# Patient Record
Sex: FEMALE | Race: WHITE | Employment: OTHER | URBAN - METROPOLITAN AREA
[De-identification: names, ages, dates, MRNs, and addresses within clinical notes are randomized per-mention and may not be internally consistent; named-entity substitution may affect disease eponyms.]

---

## 2018-06-20 RX ORDER — ALBUTEROL SULFATE 90 UG/1
2 AEROSOL, METERED RESPIRATORY (INHALATION) EVERY 6 HOURS PRN
COMMUNITY
End: 2019-04-09 | Stop reason: ALTCHOICE

## 2018-06-20 RX ORDER — NALTREXONE HYDROCHLORIDE 50 MG/1
50 TABLET, FILM COATED ORAL DAILY
COMMUNITY
End: 2019-04-09 | Stop reason: ALTCHOICE

## 2018-06-20 NOTE — PROGRESS NOTES
Karla 38 PRE-ADMISSION TESTING GENERAL INSTRUCTIONS- [facility]-phone number:[phone]    GENERAL INSTRUCTIONS  [x] Antibacterial Soap shower Night before and/or AM of Surgery  [] Jasmeet wipe instruction sheet and wipes given. [x] Nothing by mouth after midnight, including gum, candy, mints, or water. [x] You may brush your teeth, gargle, but do NOT swallow water. []Hibiclens shower  the night before and the morning of surgery. Do not use             Hibiclens on your face or head. [x]No smoking, chewing tobacco, illegal drugs, or alcohol within 24 hours of your surgery. [x] Jewelry, valuables or body piercing's should not be brought to the hospital. All body and/or tongue piercing's must be removed prior to arriving to hospital.  ALL hair pins must be removed. [x] Do not wear makeup, lotions, powders, deodorant. Nail polish as directed by the nurse. [x] Arrange transportation to and from the hospital.  Arrange for someone to be with you for the remainder of the day and for 24 hours after your procedure due to having had anesthesia. [] Bring insurance card and photo ID.  [] Transfusion Bracelet: Please bring with you to hospital, day of surgery  [] Bring urine specimen day of surgery. Any small container is acceptable. [x] Use inhalers the morning of surgery and bring with you to hospital.   []Bring copy of living will or healthcare power of  papers to be placed in your electronic record. [] CPAP/BI-PAP: Please bring your machine if you are to spend the night in the hospital.     ENDOSCOPY INSTRUCTIONS:   [] Bowel prep instructions reviewed. [] Nothing by mouth after midnight, including gum, candy, mints, or water.  [] You may brush your teeth, gargle, but do NOT swallow water. [] Do not wear makeup, lotions, powders, deodorant. Nail polish as directed by the nurse.   [] Arrange transportation to and from the hospital.  Arrange for someone to be with you for the

## 2018-06-27 ENCOUNTER — ANESTHESIA EVENT (OUTPATIENT)
Dept: ENDOSCOPY | Age: 58
End: 2018-06-27
Payer: COMMERCIAL

## 2018-06-28 ENCOUNTER — ANESTHESIA (OUTPATIENT)
Dept: ENDOSCOPY | Age: 58
End: 2018-06-28
Payer: COMMERCIAL

## 2018-06-28 ENCOUNTER — HOSPITAL ENCOUNTER (OUTPATIENT)
Age: 58
Setting detail: OUTPATIENT SURGERY
Discharge: HOME | End: 2018-06-28
Attending: ORAL & MAXILLOFACIAL SURGERY | Admitting: ORAL & MAXILLOFACIAL SURGERY
Payer: COMMERCIAL

## 2018-06-28 VITALS
SYSTOLIC BLOOD PRESSURE: 187 MMHG | OXYGEN SATURATION: 100 % | TEMPERATURE: 95.9 F | RESPIRATION RATE: 14 BRPM | DIASTOLIC BLOOD PRESSURE: 111 MMHG

## 2018-06-28 VITALS
DIASTOLIC BLOOD PRESSURE: 67 MMHG | TEMPERATURE: 97.6 F | OXYGEN SATURATION: 96 % | BODY MASS INDEX: 20.31 KG/M2 | HEIGHT: 68 IN | SYSTOLIC BLOOD PRESSURE: 158 MMHG | RESPIRATION RATE: 18 BRPM | WEIGHT: 134 LBS | HEART RATE: 79 BPM

## 2018-06-28 DIAGNOSIS — Z01.818 PREOPERATIVE TESTING: Primary | ICD-10-CM

## 2018-06-28 PROCEDURE — 6370000000 HC RX 637 (ALT 250 FOR IP): Performed by: ORAL & MAXILLOFACIAL SURGERY

## 2018-06-28 PROCEDURE — 2500000003 HC RX 250 WO HCPCS: Performed by: ORAL & MAXILLOFACIAL SURGERY

## 2018-06-28 PROCEDURE — 2500000003 HC RX 250 WO HCPCS

## 2018-06-28 PROCEDURE — 3600000002 HC SURGERY LEVEL 2 BASE: Performed by: ORAL & MAXILLOFACIAL SURGERY

## 2018-06-28 PROCEDURE — 3700000001 HC ADD 15 MINUTES (ANESTHESIA): Performed by: ORAL & MAXILLOFACIAL SURGERY

## 2018-06-28 PROCEDURE — 7100000011 HC PHASE II RECOVERY - ADDTL 15 MIN: Performed by: ORAL & MAXILLOFACIAL SURGERY

## 2018-06-28 PROCEDURE — 6370000000 HC RX 637 (ALT 250 FOR IP): Performed by: ANESTHESIOLOGY

## 2018-06-28 PROCEDURE — 6360000002 HC RX W HCPCS: Performed by: ORAL & MAXILLOFACIAL SURGERY

## 2018-06-28 PROCEDURE — 7100000001 HC PACU RECOVERY - ADDTL 15 MIN: Performed by: ORAL & MAXILLOFACIAL SURGERY

## 2018-06-28 PROCEDURE — 6360000002 HC RX W HCPCS: Performed by: ANESTHESIOLOGY

## 2018-06-28 PROCEDURE — 3600000012 HC SURGERY LEVEL 2 ADDTL 15MIN: Performed by: ORAL & MAXILLOFACIAL SURGERY

## 2018-06-28 PROCEDURE — 2709999900 HC NON-CHARGEABLE SUPPLY: Performed by: ORAL & MAXILLOFACIAL SURGERY

## 2018-06-28 PROCEDURE — 6360000002 HC RX W HCPCS

## 2018-06-28 PROCEDURE — 7100000010 HC PHASE II RECOVERY - FIRST 15 MIN: Performed by: ORAL & MAXILLOFACIAL SURGERY

## 2018-06-28 PROCEDURE — 94640 AIRWAY INHALATION TREATMENT: CPT

## 2018-06-28 PROCEDURE — 7100000000 HC PACU RECOVERY - FIRST 15 MIN: Performed by: ORAL & MAXILLOFACIAL SURGERY

## 2018-06-28 PROCEDURE — 3700000000 HC ANESTHESIA ATTENDED CARE: Performed by: ORAL & MAXILLOFACIAL SURGERY

## 2018-06-28 PROCEDURE — 2580000003 HC RX 258: Performed by: ORAL & MAXILLOFACIAL SURGERY

## 2018-06-28 PROCEDURE — 2500000003 HC RX 250 WO HCPCS: Performed by: PHYSICIAN ASSISTANT

## 2018-06-28 RX ORDER — MIDAZOLAM HYDROCHLORIDE 1 MG/ML
INJECTION INTRAMUSCULAR; INTRAVENOUS PRN
Status: DISCONTINUED | OUTPATIENT
Start: 2018-06-28 | End: 2018-06-28 | Stop reason: SDUPTHER

## 2018-06-28 RX ORDER — NEOSTIGMINE METHYLSULFATE 0.5 MG/ML
INJECTION, SOLUTION INTRAVENOUS PRN
Status: DISCONTINUED | OUTPATIENT
Start: 2018-06-28 | End: 2018-06-28 | Stop reason: SDUPTHER

## 2018-06-28 RX ORDER — DEXAMETHASONE SODIUM PHOSPHATE 4 MG/ML
8 INJECTION, SOLUTION INTRA-ARTICULAR; INTRALESIONAL; INTRAMUSCULAR; INTRAVENOUS; SOFT TISSUE ONCE
Status: DISCONTINUED | OUTPATIENT
Start: 2018-06-28 | End: 2018-06-28 | Stop reason: HOSPADM

## 2018-06-28 RX ORDER — LABETALOL HYDROCHLORIDE 5 MG/ML
5 INJECTION, SOLUTION INTRAVENOUS EVERY 10 MIN PRN
Status: DISCONTINUED | OUTPATIENT
Start: 2018-06-28 | End: 2018-06-28 | Stop reason: HOSPADM

## 2018-06-28 RX ORDER — HYDROCODONE BITARTRATE AND ACETAMINOPHEN 5; 325 MG/1; MG/1
1 TABLET ORAL ONCE
Status: COMPLETED | OUTPATIENT
Start: 2018-06-28 | End: 2018-06-28

## 2018-06-28 RX ORDER — PROPOFOL 10 MG/ML
INJECTION, EMULSION INTRAVENOUS PRN
Status: DISCONTINUED | OUTPATIENT
Start: 2018-06-28 | End: 2018-06-28 | Stop reason: SDUPTHER

## 2018-06-28 RX ORDER — DEXAMETHASONE SODIUM PHOSPHATE 10 MG/ML
INJECTION INTRAMUSCULAR; INTRAVENOUS PRN
Status: DISCONTINUED | OUTPATIENT
Start: 2018-06-28 | End: 2018-06-28 | Stop reason: SDUPTHER

## 2018-06-28 RX ORDER — FENTANYL CITRATE 50 UG/ML
25 INJECTION, SOLUTION INTRAMUSCULAR; INTRAVENOUS EVERY 5 MIN PRN
Status: DISCONTINUED | OUTPATIENT
Start: 2018-06-28 | End: 2018-06-28 | Stop reason: HOSPADM

## 2018-06-28 RX ORDER — ROCURONIUM BROMIDE 10 MG/ML
INJECTION, SOLUTION INTRAVENOUS PRN
Status: DISCONTINUED | OUTPATIENT
Start: 2018-06-28 | End: 2018-06-28 | Stop reason: SDUPTHER

## 2018-06-28 RX ORDER — HYDRALAZINE HYDROCHLORIDE 20 MG/ML
5 INJECTION INTRAMUSCULAR; INTRAVENOUS EVERY 10 MIN PRN
Status: DISCONTINUED | OUTPATIENT
Start: 2018-06-28 | End: 2018-06-28 | Stop reason: HOSPADM

## 2018-06-28 RX ORDER — PROMETHAZINE HYDROCHLORIDE 25 MG/ML
6.25 INJECTION, SOLUTION INTRAMUSCULAR; INTRAVENOUS
Status: COMPLETED | OUTPATIENT
Start: 2018-06-28 | End: 2018-06-28

## 2018-06-28 RX ORDER — ONDANSETRON 2 MG/ML
INJECTION INTRAMUSCULAR; INTRAVENOUS PRN
Status: DISCONTINUED | OUTPATIENT
Start: 2018-06-28 | End: 2018-06-28 | Stop reason: SDUPTHER

## 2018-06-28 RX ORDER — LIDOCAINE HYDROCHLORIDE AND EPINEPHRINE 10; 10 MG/ML; UG/ML
INJECTION, SOLUTION INFILTRATION; PERINEURAL PRN
Status: DISCONTINUED | OUTPATIENT
Start: 2018-06-28 | End: 2018-06-28 | Stop reason: HOSPADM

## 2018-06-28 RX ORDER — MEPERIDINE HYDROCHLORIDE 50 MG/ML
12.5 INJECTION INTRAMUSCULAR; INTRAVENOUS; SUBCUTANEOUS EVERY 5 MIN PRN
Status: DISCONTINUED | OUTPATIENT
Start: 2018-06-28 | End: 2018-06-28 | Stop reason: HOSPADM

## 2018-06-28 RX ORDER — FENTANYL CITRATE 50 UG/ML
INJECTION, SOLUTION INTRAMUSCULAR; INTRAVENOUS PRN
Status: DISCONTINUED | OUTPATIENT
Start: 2018-06-28 | End: 2018-06-28 | Stop reason: SDUPTHER

## 2018-06-28 RX ORDER — GLYCOPYRROLATE 1 MG/5 ML
SYRINGE (ML) INTRAVENOUS PRN
Status: DISCONTINUED | OUTPATIENT
Start: 2018-06-28 | End: 2018-06-28 | Stop reason: SDUPTHER

## 2018-06-28 RX ORDER — SODIUM CHLORIDE 9 MG/ML
INJECTION, SOLUTION INTRAVENOUS CONTINUOUS
Status: DISCONTINUED | OUTPATIENT
Start: 2018-06-28 | End: 2018-06-28 | Stop reason: HOSPADM

## 2018-06-28 RX ORDER — IPRATROPIUM BROMIDE AND ALBUTEROL SULFATE 2.5; .5 MG/3ML; MG/3ML
1 SOLUTION RESPIRATORY (INHALATION) ONCE
Status: COMPLETED | OUTPATIENT
Start: 2018-06-28 | End: 2018-06-28

## 2018-06-28 RX ADMIN — LIDOCAINE HYDROCHLORIDE 100 MG: 20 INJECTION, SOLUTION INTRAVENOUS at 09:30

## 2018-06-28 RX ADMIN — FENTANYL CITRATE 100 MCG: 50 INJECTION, SOLUTION INTRAMUSCULAR; INTRAVENOUS at 09:30

## 2018-06-28 RX ADMIN — Medication 0.4 MG: at 10:00

## 2018-06-28 RX ADMIN — AMPICILLIN SODIUM 2 G: 2 INJECTION, POWDER, FOR SOLUTION INTRAMUSCULAR; INTRAVENOUS at 09:30

## 2018-06-28 RX ADMIN — SUGAMMADEX 200 MG: 100 INJECTION, SOLUTION INTRAVENOUS at 10:10

## 2018-06-28 RX ADMIN — DEXAMETHASONE SODIUM PHOSPHATE 10 MG: 10 INJECTION INTRAMUSCULAR; INTRAVENOUS at 09:30

## 2018-06-28 RX ADMIN — ROCURONIUM BROMIDE 10 MG: 10 INJECTION, SOLUTION INTRAVENOUS at 09:40

## 2018-06-28 RX ADMIN — PROPOFOL 200 MG: 10 INJECTION, EMULSION INTRAVENOUS at 09:30

## 2018-06-28 RX ADMIN — NEOSTIGMINE METHYLSULFATE 3 MG: 0.5 INJECTION INTRAVENOUS at 10:00

## 2018-06-28 RX ADMIN — ROCURONIUM BROMIDE 30 MG: 10 INJECTION, SOLUTION INTRAVENOUS at 09:30

## 2018-06-28 RX ADMIN — ONDANSETRON 4 MG: 2 INJECTION INTRAMUSCULAR; INTRAVENOUS at 09:30

## 2018-06-28 RX ADMIN — FENTANYL CITRATE 25 MCG: 50 INJECTION, SOLUTION INTRAMUSCULAR; INTRAVENOUS at 10:36

## 2018-06-28 RX ADMIN — PROMETHAZINE HYDROCHLORIDE 6.25 MG: 25 INJECTION INTRAMUSCULAR; INTRAVENOUS at 13:22

## 2018-06-28 RX ADMIN — HYDROCODONE BITARTRATE AND ACETAMINOPHEN 1 TABLET: 5; 325 TABLET ORAL at 14:27

## 2018-06-28 RX ADMIN — FENTANYL CITRATE 25 MCG: 50 INJECTION, SOLUTION INTRAMUSCULAR; INTRAVENOUS at 10:25

## 2018-06-28 RX ADMIN — SODIUM CHLORIDE: 9 INJECTION, SOLUTION INTRAVENOUS at 09:33

## 2018-06-28 RX ADMIN — MIDAZOLAM 2 MG: 1 INJECTION INTRAMUSCULAR; INTRAVENOUS at 09:30

## 2018-06-28 RX ADMIN — IPRATROPIUM BROMIDE AND ALBUTEROL SULFATE 1 AMPULE: 2.5; .5 SOLUTION RESPIRATORY (INHALATION) at 10:28

## 2018-06-28 RX ADMIN — FENTANYL CITRATE 25 MCG: 50 INJECTION, SOLUTION INTRAMUSCULAR; INTRAVENOUS at 10:50

## 2018-06-28 ASSESSMENT — PULMONARY FUNCTION TESTS
PIF_VALUE: 19
PIF_VALUE: 17
PIF_VALUE: 17
PIF_VALUE: 18
PIF_VALUE: 15
PIF_VALUE: 18
PIF_VALUE: 18
PIF_VALUE: 15
PIF_VALUE: 2
PIF_VALUE: 16
PIF_VALUE: 15
PIF_VALUE: 17
PIF_VALUE: 19
PIF_VALUE: 18
PIF_VALUE: 14
PIF_VALUE: 3
PIF_VALUE: 6
PIF_VALUE: 17
PIF_VALUE: 14
PIF_VALUE: 15
PIF_VALUE: 16
PIF_VALUE: 15
PIF_VALUE: 14
PIF_VALUE: 9
PIF_VALUE: 17
PIF_VALUE: 20
PIF_VALUE: 14
PIF_VALUE: 3
PIF_VALUE: 16
PIF_VALUE: 15
PIF_VALUE: 7
PIF_VALUE: 15
PIF_VALUE: 15
PIF_VALUE: 5
PIF_VALUE: 10
PIF_VALUE: 8
PIF_VALUE: 14
PIF_VALUE: 5
PIF_VALUE: 16

## 2018-06-28 ASSESSMENT — PAIN DESCRIPTION - DESCRIPTORS
DESCRIPTORS: SORE
DESCRIPTORS: ACHING;DISCOMFORT
DESCRIPTORS: DISCOMFORT;SORE
DESCRIPTORS: ACHING;DISCOMFORT;SORE
DESCRIPTORS: ACHING;DISCOMFORT

## 2018-06-28 ASSESSMENT — PAIN SCALES - GENERAL
PAINLEVEL_OUTOF10: 10
PAINLEVEL_OUTOF10: 6
PAINLEVEL_OUTOF10: 3
PAINLEVEL_OUTOF10: 10
PAINLEVEL_OUTOF10: 2
PAINLEVEL_OUTOF10: 10
PAINLEVEL_OUTOF10: 10

## 2018-06-28 ASSESSMENT — PAIN - FUNCTIONAL ASSESSMENT: PAIN_FUNCTIONAL_ASSESSMENT: 0-10

## 2018-06-28 ASSESSMENT — PAIN DESCRIPTION - FREQUENCY
FREQUENCY: CONTINUOUS

## 2018-06-28 ASSESSMENT — PAIN DESCRIPTION - LOCATION
LOCATION: MOUTH

## 2018-06-28 ASSESSMENT — PAIN DESCRIPTION - PAIN TYPE
TYPE: SURGICAL PAIN
TYPE: ACUTE PAIN;SURGICAL PAIN
TYPE: SURGICAL PAIN
TYPE: ACUTE PAIN;SURGICAL PAIN
TYPE: ACUTE PAIN;SURGICAL PAIN

## 2018-06-28 ASSESSMENT — LIFESTYLE VARIABLES: SMOKING_STATUS: 1

## 2018-06-28 ASSESSMENT — PAIN DESCRIPTION - PROGRESSION
CLINICAL_PROGRESSION: GRADUALLY IMPROVING
CLINICAL_PROGRESSION: NOT CHANGED
CLINICAL_PROGRESSION: NOT CHANGED

## 2018-06-28 ASSESSMENT — PAIN DESCRIPTION - ONSET: ONSET: GRADUAL

## 2018-09-26 ENCOUNTER — HOSPITAL ENCOUNTER (OUTPATIENT)
Dept: ULTRASOUND IMAGING | Age: 58
Discharge: HOME | End: 2018-09-26
Payer: COMMERCIAL

## 2018-09-26 DIAGNOSIS — M54.9 CVA TENDERNESS: ICD-10-CM

## 2018-09-26 PROCEDURE — 76775 US EXAM ABDO BACK WALL LIM: CPT

## 2018-12-28 ENCOUNTER — HOSPITAL ENCOUNTER (OUTPATIENT)
Dept: GENERAL RADIOLOGY | Age: 58
Discharge: HOME | End: 2018-12-30
Payer: COMMERCIAL

## 2018-12-28 ENCOUNTER — HOSPITAL ENCOUNTER (OUTPATIENT)
Age: 58
Discharge: HOME | End: 2018-12-30
Payer: COMMERCIAL

## 2018-12-28 DIAGNOSIS — M54.2 CERVICALGIA: ICD-10-CM

## 2018-12-28 PROCEDURE — 70360 X-RAY EXAM OF NECK: CPT

## 2019-04-09 ENCOUNTER — APPOINTMENT (OUTPATIENT)
Dept: GENERAL RADIOLOGY | Age: 59
End: 2019-04-09
Payer: COMMERCIAL

## 2019-04-09 ENCOUNTER — HOSPITAL ENCOUNTER (EMERGENCY)
Age: 59
Discharge: HOME | End: 2019-04-09
Attending: FAMILY MEDICINE
Payer: COMMERCIAL

## 2019-04-09 VITALS
DIASTOLIC BLOOD PRESSURE: 70 MMHG | TEMPERATURE: 97 F | SYSTOLIC BLOOD PRESSURE: 103 MMHG | OXYGEN SATURATION: 99 % | HEIGHT: 71 IN | WEIGHT: 145 LBS | BODY MASS INDEX: 20.3 KG/M2 | HEART RATE: 64 BPM | RESPIRATION RATE: 16 BRPM

## 2019-04-09 DIAGNOSIS — M25.551 RIGHT HIP PAIN: ICD-10-CM

## 2019-04-09 DIAGNOSIS — M47.812 CERVICAL SPINE ARTHRITIS: ICD-10-CM

## 2019-04-09 DIAGNOSIS — M54.50 LUMBAR PAIN: Primary | ICD-10-CM

## 2019-04-09 PROCEDURE — 72100 X-RAY EXAM L-S SPINE 2/3 VWS: CPT

## 2019-04-09 PROCEDURE — 73502 X-RAY EXAM HIP UNI 2-3 VIEWS: CPT

## 2019-04-09 PROCEDURE — 72050 X-RAY EXAM NECK SPINE 4/5VWS: CPT

## 2019-04-09 PROCEDURE — 99283 EMERGENCY DEPT VISIT LOW MDM: CPT

## 2019-04-09 RX ORDER — LIDOCAINE 4 G/G
1 PATCH TOPICAL DAILY
Qty: 30 PATCH | Refills: 0 | Status: ON HOLD | OUTPATIENT
Start: 2019-04-09 | End: 2021-09-14

## 2019-04-09 RX ORDER — CYCLOBENZAPRINE HCL 5 MG
5 TABLET ORAL 2 TIMES DAILY PRN
Qty: 30 TABLET | Refills: 0 | Status: SHIPPED | OUTPATIENT
Start: 2019-04-09 | End: 2019-04-19

## 2019-04-09 ASSESSMENT — PAIN DESCRIPTION - LOCATION: LOCATION: BACK;HIP;LEG

## 2019-04-09 ASSESSMENT — PAIN DESCRIPTION - PROGRESSION: CLINICAL_PROGRESSION: NOT CHANGED

## 2019-04-09 ASSESSMENT — PAIN DESCRIPTION - FREQUENCY: FREQUENCY: CONTINUOUS

## 2019-04-09 ASSESSMENT — PAIN DESCRIPTION - PAIN TYPE: TYPE: ACUTE PAIN

## 2019-04-09 ASSESSMENT — PAIN DESCRIPTION - ONSET: ONSET: ON-GOING

## 2019-04-09 ASSESSMENT — PAIN SCALES - GENERAL: PAINLEVEL_OUTOF10: 10

## 2019-04-09 ASSESSMENT — PAIN DESCRIPTION - ORIENTATION: ORIENTATION: RIGHT;LOWER;UPPER

## 2019-04-09 ASSESSMENT — PAIN DESCRIPTION - DESCRIPTORS: DESCRIPTORS: SHARP;BURNING

## 2019-04-09 NOTE — ED PROVIDER NOTES
to pain). Negative for self-injury. All other systems reviewed and are negative.        ------------------------- NURSING NOTES AND VITALS REVIEWED ---------------------------   The nursing notes within the ED encounter and vital signs as below have been reviewed. /70   Pulse 64   Temp 97 °F (36.1 °C) (Oral)   Resp 16   Ht 5' 11\" (1.803 m)   Wt 145 lb (65.8 kg)   LMP  (LMP Unknown)   SpO2 99%   BMI 20.22 kg/m²   Oxygen Saturation Interpretation: Normal    Physical Exam   Constitutional: She is oriented to person, place, and time. No distress. HENT:   Head: Normocephalic and atraumatic. Eyes: Conjunctivae and EOM are normal. Right eye exhibits no discharge. Left eye exhibits no discharge. Neck: Neck supple. No thyromegaly present. Cardiovascular: Normal rate, regular rhythm, normal heart sounds and intact distal pulses. No murmur heard. Pulmonary/Chest: Effort normal and breath sounds normal. No respiratory distress. She exhibits no tenderness. Abdominal: Soft. Bowel sounds are normal. She exhibits no distension. There is no tenderness. There is no rebound and no guarding. Musculoskeletal: She exhibits no edema. Right hip: She exhibits decreased range of motion and decreased strength. Left hip: She exhibits normal range of motion and normal strength. Right knee: She exhibits decreased range of motion, swelling and bony tenderness. Left knee: She exhibits decreased range of motion, swelling and bony tenderness. Cervical back: She exhibits decreased range of motion, tenderness, deformity, pain and spasm. Lumbar back: She exhibits decreased range of motion, tenderness, pain and spasm. Lymphadenopathy:     She has no cervical adenopathy. Neurological: She is alert and oriented to person, place, and time. She displays no seizure activity. Gait abnormal. GCS eye subscore is 4. GCS verbal subscore is 5. GCS motor subscore is 6.    Skin: Skin is warm and dry. Psychiatric: Her speech is normal. Her affect is blunt. Nursing note and vitals reviewed. --------------------------------------------- PAST HISTORY---------------------------------------------  Past Medical History:  has a past medical history of Alcoholism ([facility] [address] COPD (chronic obstructive pulmonary disease) ([facility] [address] Dental caries, Hypertension, Lung nodules, Schizophrenia ([facility] [address] and Ventricular hypokinesis. Past Surgical History:  has a past surgical history that includes Echo Complete (1/10/2014); Appendectomy;  section; Wrist surgery (Right); shoulder surgery (Right); and pr dental surgery procedure (N/A, 2018). Social History:  reports that she has been smoking. She has a 42.00 pack-year smoking history. She has never used smokeless tobacco. She reports that she does not drink alcohol or use drugs. Family History: family history includes Cancer in her father; Other in her mother. The patients home medications have been reviewed. Allergies: Codeine; Dust mite extract; Aspirin; and Penicillins    -------------------------------------------------- RESULTS -------------------------------------------------  No results found for this visit on 19. XR LUMBAR SPINE (2-3 VIEWS)   Final Result   Isolated disc disease L4-L5. No acute findings      XR CERVICAL SPINE (4-5 VIEWS)   Final Result   Moderately advanced arthritis given patient's age with minimal   anterolisthesis C4 over C5 and disc disease with spurring C5-C7. XR HIP RIGHT (2-3 VIEWS)   Final Result   No acute findings. Medications - No data to display    ------------------------------------------ PROGRESS NOTES ------------------------------------------   I have spoken with the patient and  and discussed todays results, in addition to providing specific details for the plan of care and counseling regarding the diagnosis and prognosis.   Their questions are answered at this time and they are agreeablewith the plan. IMPRESSION:  1. Lumbar pain    2. Right hip pain    3. Cervical spine arthritis        PLAN:  Discharge Medication List as of 4/9/2019  8:01 PM      START taking these medications    Details   cyclobenzaprine (FLEXERIL) 5 MG tablet Take 1 tablet by mouth 2 times daily as needed for Muscle spasms, Disp-30 tablet, R-0Print      lidocaine 4 % external patch Place 1 patch onto the skin daily, Transdermal, DAILY Starting Tue 4/9/2019, Disp-30 patch, R-0, Print           · PATIENT STATES HER NECK WAS THE MOST CONCERNING FOR HER. · IMAGING PERFORMED IN URGENT CARE. · DISCUSSED X-RAY RESULTS AS STATED IN THE REPORT. · A COPY OF THE IMAGING REPORT PROVIDED TODAY WITH DISCHARGE PAPERWORK. · PATIENT STATES SHE WILL GET IMAGING OF BOTH KNEES WHEN SHE FOLLOWS UP WITH HER DOCTOR. · REST AND ELEVATE THE AFFECTED AREA.  APPLY ICE AS NEEDED FOR PAIN IN 10 MINUTE INCREMENTS OVER A TOWEL OR CLOTHING.  TAKE MEDICATION AS PRESCRIBED.  Follow up with Primary Care Doctor in 1 WEEK if no improvement. MAY NEED REFERRAL TO SPECIALISTS SUCH AS ORTHOPEDIC SURGERY, PAIN MANAGEMENT AND/OR SPINE SPECIALIST. DISPOSITION  Disposition: Discharge to home  Patient condition is good    --------------------------------- ADDITIONAL PROVIDERNOTES ---------------------------------     This patient is stable for discharge. I have shared the specific conditions for return, as well as the importance of follow-up.         Joaquina Sepulveda DO  04/10/19 3607

## 2019-04-10 ASSESSMENT — ENCOUNTER SYMPTOMS
NAUSEA: 0
COUGH: 0
VOMITING: 0
BACK PAIN: 1
DIARRHEA: 0
BLOOD IN STOOL: 0
SHORTNESS OF BREATH: 0
ABDOMINAL PAIN: 0

## 2019-08-22 ENCOUNTER — OFFICE VISIT (OUTPATIENT)
Dept: PAIN MANAGEMENT | Age: 59
End: 2019-08-22
Payer: COMMERCIAL

## 2019-08-22 VITALS
HEIGHT: 72 IN | SYSTOLIC BLOOD PRESSURE: 122 MMHG | RESPIRATION RATE: 16 BRPM | OXYGEN SATURATION: 97 % | TEMPERATURE: 98.6 F | DIASTOLIC BLOOD PRESSURE: 72 MMHG | HEART RATE: 100 BPM | BODY MASS INDEX: 18.15 KG/M2 | WEIGHT: 134 LBS

## 2019-08-22 DIAGNOSIS — M25.561 ACUTE PAIN OF RIGHT KNEE: ICD-10-CM

## 2019-08-22 DIAGNOSIS — G89.4 CHRONIC PAIN SYNDROME: ICD-10-CM

## 2019-08-22 DIAGNOSIS — M50.90 CERVICAL DISC DISORDER: ICD-10-CM

## 2019-08-22 DIAGNOSIS — M47.22 OSTEOARTHRITIS OF SPINE WITH RADICULOPATHY, CERVICAL REGION: ICD-10-CM

## 2019-08-22 DIAGNOSIS — M54.12 CERVICAL RADICULOPATHY: Primary | ICD-10-CM

## 2019-08-22 PROBLEM — G89.29 CHRONIC PAIN OF RIGHT KNEE: Status: ACTIVE | Noted: 2019-08-22

## 2019-08-22 PROCEDURE — 99204 OFFICE O/P NEW MOD 45 MIN: CPT | Performed by: PAIN MEDICINE

## 2019-08-22 PROCEDURE — G8427 DOCREV CUR MEDS BY ELIG CLIN: HCPCS | Performed by: PAIN MEDICINE

## 2019-08-22 PROCEDURE — 4004F PT TOBACCO SCREEN RCVD TLK: CPT | Performed by: PAIN MEDICINE

## 2019-08-22 PROCEDURE — G8419 CALC BMI OUT NRM PARAM NOF/U: HCPCS | Performed by: PAIN MEDICINE

## 2019-08-22 PROCEDURE — 3017F COLORECTAL CA SCREEN DOC REV: CPT | Performed by: PAIN MEDICINE

## 2019-08-22 RX ORDER — IBUPROFEN 400 MG/1
400 TABLET ORAL EVERY 6 HOURS PRN
Status: ON HOLD | COMMUNITY
End: 2021-07-05 | Stop reason: HOSPADM

## 2019-08-22 RX ORDER — BENZTROPINE MESYLATE 1 MG/1
1 TABLET ORAL 2 TIMES DAILY
COMMUNITY

## 2019-08-22 NOTE — PROGRESS NOTES
Norbert Joyce presents to the [facility] on 8/22/2019. Yanique Joy is complaining of pain back and whole body, right leg. The pain is constant. The pain is described as aching, throbbing, shooting, sharp and tender. Pain is rated on her best day at a 10, on her worst day at a 10, and on average at a 10 on the VAS scale. She took her last dose of Neurontin nor co giving by ER Doctor. Any procedures since your last visit: No, with  % relief. She has not been on anticoagulation medications to include none and is managed by . Pacemaker or defibrilator: No Physician managing device is . /72 (Site: Right Upper Arm, Position: Sitting, Cuff Size: Medium Adult)   Pulse 100   Temp 98.6 °F (37 °C) (Oral)   Resp 16   Ht 6' (1.829 m)   Wt 134 lb (60.8 kg)   LMP  (LMP Unknown)   SpO2 97%   BMI 18.17 kg/m²      No LMP recorded (lmp unknown).  Patient is postmenopausal.
stopped recent    Drug use: No     Types: Marijuana     Comment: per     Sexual activity: Not Currently   Lifestyle    Physical activity:     Days per week: Not on file     Minutes per session: Not on file    Stress: Not on file   Relationships    Social connections:     Talks on phone: Not on file     Gets together: Not on file     Attends [employer] service: Not on file     Active member of club or organization: Not on file     Attends meetings of clubs or organizations: Not on file     Relationship status: Not on file    Intimate partner violence:     Fear of current or ex partner: Not on file     Emotionally abused: Not on file     Physically abused: Not on file     Forced sexual activity: Not on file   Other Topics Concern    Not on file   Social History Narrative    Not on file     Family History   Problem Relation Age of Onset    Other Mother         schizophrenia    Cancer Father      REVIEW OF SYSTEMS:     Patient specifically denies fever/chills, chest pain, shortness of breath, new bowel or bladder complaints. All other review of systems was negative. PHYSICAL EXAMINATION:      /72 (Site: Right Upper Arm, Position: Sitting, Cuff Size: Medium Adult)   Pulse 100   Temp 98.6 °F (37 °C) (Oral)   Resp 16   Ht 6' (1.829 m)   Wt 134 lb (60.8 kg)   LMP  (LMP Unknown)   SpO2 97%   BMI 18.17 kg/m²     General:      General appearance:   pleasant and well-hydrated. , in moderate discomfort and A & O x3  Build:Normal Weight    HEENT:    Head:normocephalic and atraumatic  Pupils:regular, round and equal.  Sclera: icterus absent,     Lungs:    Breathing:Breathing Pattern: regular, no distress    Abdomen:    Shape:non-distended and normal  Tenderness:none    Cervical spine:    Inspection:normal  Palpation:tenderness paravertebral muscles, facet loading, left, right, positive and tenderness.   Range of motion:abnormal moderately flexion, extension rotation bilateral and is

## 2020-07-28 ENCOUNTER — HOSPITAL ENCOUNTER (OUTPATIENT)
Age: 60
Discharge: HOME | End: 2020-07-30
Payer: COMMERCIAL

## 2020-07-28 ENCOUNTER — HOSPITAL ENCOUNTER (OUTPATIENT)
Dept: GENERAL RADIOLOGY | Age: 60
Discharge: HOME | End: 2020-07-30
Payer: COMMERCIAL

## 2020-07-28 PROCEDURE — 71046 X-RAY EXAM CHEST 2 VIEWS: CPT

## 2020-08-31 ENCOUNTER — APPOINTMENT (OUTPATIENT)
Dept: GENERAL RADIOLOGY | Age: 60
End: 2020-08-31
Payer: COMMERCIAL

## 2020-08-31 ENCOUNTER — HOSPITAL ENCOUNTER (EMERGENCY)
Age: 60
Discharge: HOME | End: 2020-08-31
Attending: EMERGENCY MEDICINE
Payer: COMMERCIAL

## 2020-08-31 VITALS
SYSTOLIC BLOOD PRESSURE: 142 MMHG | OXYGEN SATURATION: 98 % | BODY MASS INDEX: 19.6 KG/M2 | WEIGHT: 140 LBS | RESPIRATION RATE: 16 BRPM | DIASTOLIC BLOOD PRESSURE: 73 MMHG | TEMPERATURE: 97.3 F | HEIGHT: 71 IN | HEART RATE: 93 BPM

## 2020-08-31 PROCEDURE — 99284 EMERGENCY DEPT VISIT MOD MDM: CPT

## 2020-08-31 PROCEDURE — 6370000000 HC RX 637 (ALT 250 FOR IP): Performed by: EMERGENCY MEDICINE

## 2020-08-31 PROCEDURE — 71101 X-RAY EXAM UNILAT RIBS/CHEST: CPT

## 2020-08-31 PROCEDURE — 73030 X-RAY EXAM OF SHOULDER: CPT

## 2020-08-31 PROCEDURE — 99283 EMERGENCY DEPT VISIT LOW MDM: CPT

## 2020-08-31 RX ORDER — NAPROXEN 500 MG/1
500 TABLET ORAL ONCE
Status: COMPLETED | OUTPATIENT
Start: 2020-08-31 | End: 2020-08-31

## 2020-08-31 RX ORDER — NAPROXEN 500 MG/1
500 TABLET ORAL 2 TIMES DAILY PRN
Qty: 180 TABLET | Refills: 0 | Status: ON HOLD | OUTPATIENT
Start: 2020-08-31 | End: 2021-07-05 | Stop reason: HOSPADM

## 2020-08-31 RX ADMIN — NAPROXEN 500 MG: 500 TABLET ORAL at 13:33

## 2020-08-31 ASSESSMENT — ENCOUNTER SYMPTOMS
NAUSEA: 0
SHORTNESS OF BREATH: 0
EYE PAIN: 0
EYE REDNESS: 0
COUGH: 0
PHOTOPHOBIA: 0
CONSTIPATION: 0
FACIAL SWELLING: 0
COLOR CHANGE: 0
ABDOMINAL DISTENTION: 0
CHEST TIGHTNESS: 0
RHINORRHEA: 0
DIARRHEA: 0

## 2020-08-31 ASSESSMENT — PAIN DESCRIPTION - ORIENTATION: ORIENTATION: RIGHT

## 2020-08-31 ASSESSMENT — PAIN SCALES - GENERAL
PAINLEVEL_OUTOF10: 10
PAINLEVEL_OUTOF10: 10

## 2020-08-31 ASSESSMENT — PAIN DESCRIPTION - LOCATION: LOCATION: WRIST;SHOULDER

## 2020-08-31 ASSESSMENT — PAIN DESCRIPTION - PAIN TYPE: TYPE: ACUTE PAIN

## 2020-08-31 NOTE — ED PROVIDER NOTES
Patient is a 79-year-old female presents emergency department with a fall that occurred last Thursday when she fell down 3 steps of stairs. She states she did not hit her head did not lose consciousness and did ambulate after the event. She does have chronic shoulder pain with some hardware in the right shoulder as well as chronic bilateral knee pain and states that from time to time her knees just give out on her. Patient's presented emergency department right now due to the pain in her right shoulder not going away as fast as she thought it would as well as some pain in the right lateral rib region that is more tender to palpation along the proximity of the 11th and 12th ribs. Patient states that these pains are made worse by palpating the area may better by nothing. Denies any associated shortness of breath, chest pain, nausea, vomiting, diarrhea    The history is provided by the patient. No  was used. Review of Systems   Constitutional: Negative for activity change, chills, diaphoresis and fatigue. HENT: Negative for congestion, facial swelling, hearing loss and rhinorrhea. Eyes: Negative for photophobia, pain and redness. Respiratory: Negative for cough, chest tightness and shortness of breath. Cardiovascular: Negative for chest pain, palpitations and leg swelling. Gastrointestinal: Negative for abdominal distention, constipation, diarrhea and nausea. Genitourinary: Negative for difficulty urinating, dysuria, frequency and hematuria. Musculoskeletal: Positive for arthralgias. Negative for joint swelling and myalgias. Skin: Negative for color change, pallor and rash. Neurological: Negative for light-headedness, numbness and headaches. Hematological: Negative for adenopathy. Physical Exam  Vitals signs and nursing note reviewed. Constitutional:       Appearance: She is well-developed. HENT:      Head: Normocephalic and atraumatic.       Right Ear: Tympanic membrane normal.      Left Ear: Tympanic membrane normal.      Nose: Nose normal. No congestion. Mouth/Throat:      Mouth: Mucous membranes are moist.      Pharynx: Oropharynx is clear. Eyes:      Pupils: Pupils are equal, round, and reactive to light. Neck:      Musculoskeletal: Normal range of motion and neck supple. Cardiovascular:      Rate and Rhythm: Normal rate and regular rhythm. Pulmonary:      Effort: Pulmonary effort is normal. No respiratory distress. Breath sounds: Normal breath sounds. No wheezing or rales. Abdominal:      General: Bowel sounds are normal.      Palpations: Abdomen is soft. Tenderness: There is no abdominal tenderness. There is no guarding or rebound. Musculoskeletal:         General: Tenderness present. No deformity. Right lower leg: No edema. Left lower leg: No edema. Comments: Tenderness on palpation of the right shoulder and lateral aspect of the ribs on the right proximal area of ribs 10 through 12. Skin:     General: Skin is warm and dry. Neurological:      Mental Status: She is alert and oriented to person, place, and time. Cranial Nerves: No cranial nerve deficit. Coordination: Coordination normal.          Procedures           MDM  Number of Diagnoses or Management Options  Contusion of rib on right side, initial encounter:   Strain of right shoulder, initial encounter:   Diagnosis management comments: Patient presented few days after a fall at home down a few stairs denies hitting head denies loss of consciousness. Patient ambulated into the emergency department does have chronic pain in her right shoulder and some hardware placed in right shoulder several years ago when to get evaluated for possible fracture. Also has some pain on the lateral aspect of the ribs on the right. Both are x-rayed imaging imaging did not reveal any acute or subacute fractures.   Patient was given naproxen for pain relief which improved her symptoms. She is given a prescription for naproxen as well as a arm sling/immobilizer and told to rest and ice and to follow-up with her orthopedic surgeon for evaluation.            --------------------------------------------- PAST HISTORY ---------------------------------------------  Past Medical History:  has a past medical history of Alcoholism ([facility] [address] COPD (chronic obstructive pulmonary disease) ([facility] [address] Dental caries, Hypertension, Lung nodules, Schizophrenia ([facility] [address] and Ventricular hypokinesis. Past Surgical History:  has a past surgical history that includes Echo Complete (1/10/2014); Appendectomy;  section; Wrist surgery (Right); shoulder surgery (Right); and pr dental surgery procedure (N/A, 2018). Social History:  reports that she has been smoking. She has a 42.00 pack-year smoking history. She has never used smokeless tobacco. She reports current alcohol use. She reports that she does not use drugs. Family History: family history includes Cancer in her father; Other in her mother. The patients home medications have been reviewed. Allergies: Codeine; Dust mite extract; Aspirin; and Penicillins    -------------------------------------------------- RESULTS -------------------------------------------------  Labs:  No results found for this visit on 20. Radiology:  XR RIBS RIGHT INCLUDE CHEST (MIN 3 VIEWS)   Final Result   No acute pulmonary process. No right-sided rib fractures. XR SHOULDER RIGHT (MIN 2 VIEWS)   Final Result   No radiographic complications of intramedullary viet in the right humerus. No acute radiographic findings of right shoulder. ------------------------- NURSING NOTES AND VITALS REVIEWED ---------------------------  Date / Time Roomed:  2020 12:17 PM  ED Bed Assignment:  [id number]    The nursing notes within the ED encounter and vital signs as below have been reviewed.    BP (!) 142/73   Pulse 93 Temp 97.3 °F (36.3 °C)   Resp 16   Ht 5' 11\" (1.803 m)   Wt 140 lb (63.5 kg)   LMP  (LMP Unknown)   SpO2 98%   BMI 19.53 kg/m²   Oxygen Saturation Interpretation: Normal      ------------------------------------------ PROGRESS NOTES ------------------------------------------  1:53 PM EDT  I have spoken with the patient and discussed todays results, in addition to providing specific details for the plan of care and counseling regarding the diagnosis and prognosis. Their questions are answered at this time and they are agreeable with the plan. I discussed at length with them reasons for immediate return here for re evaluation. They will followup with PCP by calling their office tomorrow      --------------------------------- ADDITIONAL PROVIDER NOTES ---------------------------------  At this time the patient is without objective evidence of an acute process requiring hospitalization or inpatient management. They have remained hemodynamically stable throughout their entire ED visit and are stable for discharge with outpatient follow-up. The plan has been discussed in detail and they are aware of the specific conditions for emergent return, as well as the importance of follow-up. New Prescriptions    NAPROXEN (NAPROSYN) 500 MG TABLET    Take 1 tablet by mouth 2 times daily as needed for Pain       Diagnosis:  1. Strain of right shoulder, initial encounter    2. Contusion of rib on right side, initial encounter        Disposition:  Patient's disposition: Discharge to home  Patient's condition is stable.                   Janice Rose DO  Resident  08/31/20 6628

## 2020-08-31 NOTE — ED NOTES
INSTRUCITON TO PT PER DR BO. PT STATES PAIN IS SLIGHTLY BETTER INCREASING WITH ROM.       Nguyne Rosales, ASHLEIGHN  49/35/43 [phone]

## 2020-10-09 ENCOUNTER — APPOINTMENT (OUTPATIENT)
Dept: GENERAL RADIOLOGY | Age: 60
End: 2020-10-09
Payer: COMMERCIAL

## 2020-10-09 ENCOUNTER — HOSPITAL ENCOUNTER (EMERGENCY)
Age: 60
Discharge: HOME | End: 2020-10-09
Attending: EMERGENCY MEDICINE
Payer: COMMERCIAL

## 2020-10-09 ENCOUNTER — APPOINTMENT (OUTPATIENT)
Dept: CT IMAGING | Age: 60
End: 2020-10-09
Payer: COMMERCIAL

## 2020-10-09 VITALS
RESPIRATION RATE: 24 BRPM | HEART RATE: 90 BPM | BODY MASS INDEX: 19.53 KG/M2 | TEMPERATURE: 98.2 F | DIASTOLIC BLOOD PRESSURE: 64 MMHG | OXYGEN SATURATION: 97 % | WEIGHT: 140 LBS | SYSTOLIC BLOOD PRESSURE: 113 MMHG

## 2020-10-09 LAB
CHP ED QC CHECK: YES
ETHANOL: <10 MG/DL (ref 0–0.08)
GLUCOSE BLD-MCNC: 80 MG/DL
METER GLUCOSE: 80 MG/DL (ref 74–99)
METER GLUCOSE: 84 MG/DL (ref 74–99)

## 2020-10-09 PROCEDURE — 82962 GLUCOSE BLOOD TEST: CPT

## 2020-10-09 PROCEDURE — G0480 DRUG TEST DEF 1-7 CLASSES: HCPCS

## 2020-10-09 PROCEDURE — 36415 COLL VENOUS BLD VENIPUNCTURE: CPT

## 2020-10-09 PROCEDURE — 70450 CT HEAD/BRAIN W/O DYE: CPT

## 2020-10-09 PROCEDURE — 72125 CT NECK SPINE W/O DYE: CPT

## 2020-10-09 PROCEDURE — 73030 X-RAY EXAM OF SHOULDER: CPT

## 2020-10-09 PROCEDURE — 20670 REMOVAL IMPLANT SUPERFICIAL: CPT | Performed by: ORTHOPAEDIC SURGERY

## 2020-10-09 PROCEDURE — 99283 EMERGENCY DEPT VISIT LOW MDM: CPT | Performed by: ORTHOPAEDIC SURGERY

## 2020-10-09 PROCEDURE — 71046 X-RAY EXAM CHEST 2 VIEWS: CPT

## 2020-10-09 PROCEDURE — 99284 EMERGENCY DEPT VISIT MOD MDM: CPT

## 2020-10-09 RX ORDER — CEPHALEXIN 500 MG/1
500 CAPSULE ORAL 3 TIMES DAILY
Qty: 21 CAPSULE | Refills: 0 | Status: SHIPPED | OUTPATIENT
Start: 2020-10-09 | End: 2020-10-16

## 2020-10-09 ASSESSMENT — PAIN SCALES - GENERAL: PAINLEVEL_OUTOF10: 6

## 2020-10-09 ASSESSMENT — ENCOUNTER SYMPTOMS
COUGH: 0
ABDOMINAL PAIN: 0
SHORTNESS OF BREATH: 0
BACK PAIN: 1
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
## 2020-10-09 NOTE — ED NOTES
Discussed further plan of care with patient, patient continues to refuse tetanus and sutures.       Jayla Kimble RN  10/09/20 7769
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
## 2020-10-09 NOTE — ED NOTES
The pt is now willing to have the laceration to her forehead repaired.  Dr milo Williamson, RN  10/09/20 0635

## 2020-10-09 NOTE — CONSULTS
toes, ankle, knee and hip with active and passive ROM without pain,+2/4 DP & PT pulses, cap refill <3sec, +5/5 PF/DF/EHL, distal sensation grossly intact to L4-S1 dermatomes, compartments soft and compressible. · Pelvis: -TTP, -Log roll, -Heel strike     DATA:    CBC:   Lab Results   Component Value Date    WBC 6.3 04/28/2017    RBC 3.74 04/28/2017    HGB 12.5 04/28/2017    HCT 37.6 04/28/2017    .5 04/28/2017    MCH 33.4 04/28/2017    MCHC 33.2 04/28/2017    RDW 12.8 04/28/2017     04/28/2017    MPV 10.8 04/28/2017     PT/INR:  No results found for: PROTIME, INR    Radiology Review:  XR shoulder right:  Demonstrates previous intramedullary nailing with well healed fracture. Osteopenia present. The second most proximal interlocking screw is broken and free floating laterally. Review of previous imaging shows screw back out. No fractures or dislocations    IMPRESSION:  · Exposed broken screw right shoulder    PLAN:  · After consent was obtained 10 cc of lidocaine was injected about the shoulder laceration. The site was prepped with betadine solution. Straight hemostat was used to grasp the exposed screw through the laceration and it was removed. The site was then irrigated and dressed with a bandaid. The patient tolerated this well without complication.   · WBAT RUE  · No restrictions  · Patient may follow up as needed, states she will follow up with Dr. Cardell Duane if she chooses to see an orthopedist, as she has seen him for left shoulder injury in the past  · Ice as needed  · Pain per ED  · Discuss with Dr. Cardell Duane

## 2020-10-24 NOTE — CONSULTS
degrees of abduction with 30 degrees external rotation  No pain at the elbow, wrist, or hand  Motor function intact to AIN/PIN/median/radial/ulnar nerves  Sensation intact light touch axillary/median/radial/ulnar nerve distributions  Radial pulse 2+     Secondary Exam:   leftUE: No obvious signs of trauma. -TTP to fingers, hand, wrist, forearm, elbow, humerus, shoulder or clavicle. -- Patient able to flex/extend fingers, wrist, elbow and shoulder with active and passive ROM without pain, +2/4 Radial pulse, cap refill <3sec, +AIN/PIN/Radial/Ulnar/Median N, distal sensation grossly intact to C4-T1 dermatomes, compartments soft and compressible. bilateralLE: No obvious signs of trauma. -TTP to foot, ankle, leg, knee, thigh, hip.-- Patient able to flex/extend toes, ankle, knee and hip with active and passive ROM without pain,+2/4 DP & PT pulses, cap refill <3sec, +5/5 PF/DF/EHL, distal sensation grossly intact to L4-S1 dermatomes, compartments soft and compressible. Pelvis: -TTP, -Log roll, -Heel strike      DATA:    CBC:         Lab Results   Component Value Date     WBC 6.3 04/28/2017     RBC 3.74 04/28/2017     HGB 12.5 04/28/2017     HCT 37.6 04/28/2017     .5 04/28/2017     MCH 33.4 04/28/2017     MCHC 33.2 04/28/2017     RDW 12.8 04/28/2017      04/28/2017     MPV 10.8 04/28/2017      PT/INR:  No results found for: PROTIME, INR     Radiology Review:  XR shoulder right:  Demonstrates previous intramedullary nailing with well healed fracture. Osteopenia present. The second most proximal interlocking screw is broken and free floating laterally. Review of previous imaging shows screw back out. No fractures or dislocations     IMPRESSION:  Exposed broken screw right shoulder     PLAN:  Patient seen and examined. MRI reviewed with patient in detail.   Natural history and course discussed with patient in long discussion  Treatment options discussed with patient in detail including risks and benefits. Patient willing to have broken exposed screw removed at bedside. After consent was obtained 10 cc of lidocaine was injected about the shoulder laceration. The site was prepped with betadine solution. Straight hemostat was used to grasp the exposed screw through the laceration and it was removed. The site was then irrigated and dressed with a bandaid. The patient tolerated this well without complication.   WBAT RUE  No restrictions  Patient may follow up as needed, states she will follow up in 1 week  Antibiotics per ED  Ice as needed  Pain per ED

## 2021-07-02 ENCOUNTER — HOSPITAL ENCOUNTER (INPATIENT)
Age: 61
LOS: 3 days | Discharge: HOME | DRG: 720 | End: 2021-07-05
Attending: EMERGENCY MEDICINE | Admitting: INTERNAL MEDICINE
Payer: COMMERCIAL

## 2021-07-02 ENCOUNTER — APPOINTMENT (OUTPATIENT)
Dept: CT IMAGING | Age: 61
DRG: 720 | End: 2021-07-02
Payer: COMMERCIAL

## 2021-07-02 DIAGNOSIS — J44.1 COPD EXACERBATION (HCC): ICD-10-CM

## 2021-07-02 DIAGNOSIS — J96.01 ACUTE RESPIRATORY FAILURE WITH HYPOXIA (HCC): Primary | ICD-10-CM

## 2021-07-02 DIAGNOSIS — J18.9 PNEUMONIA OF RIGHT LUNG DUE TO INFECTIOUS ORGANISM, UNSPECIFIED PART OF LUNG: ICD-10-CM

## 2021-07-02 PROBLEM — F32.A DEPRESSION: Status: ACTIVE | Noted: 2021-07-02

## 2021-07-02 LAB
ALBUMIN SERPL-MCNC: 3.9 G/DL (ref 3.5–5.2)
ALP BLD-CCNC: 100 U/L (ref 35–104)
ALT SERPL-CCNC: 8 U/L (ref 0–32)
ANION GAP SERPL CALCULATED.3IONS-SCNC: 17 MMOL/L (ref 7–16)
AST SERPL-CCNC: 16 U/L (ref 0–31)
B.E.: -3.2 MMOL/L (ref -3–3)
BASOPHILS ABSOLUTE: 0 E9/L (ref 0–0.2)
BASOPHILS RELATIVE PERCENT: 0.2 % (ref 0–2)
BILIRUB SERPL-MCNC: 1.3 MG/DL (ref 0–1.2)
BUN BLDV-MCNC: 14 MG/DL (ref 6–20)
BURR CELLS: ABNORMAL
CALCIUM SERPL-MCNC: 9.2 MG/DL (ref 8.6–10.2)
CHLORIDE BLD-SCNC: 100 MMOL/L (ref 98–107)
CO2: 21 MMOL/L (ref 22–29)
COHB: 2.2 % (ref 0–1.5)
CREAT SERPL-MCNC: 1.1 MG/DL (ref 0.5–1)
CRITICAL: ABNORMAL
DATE ANALYZED: ABNORMAL
DATE OF COLLECTION: ABNORMAL
EKG ATRIAL RATE: 127 BPM
EKG Q-T INTERVAL: 338 MS
EKG QRS DURATION: 86 MS
EKG QTC CALCULATION (BAZETT): 489 MS
EKG R AXIS: 78 DEGREES
EKG T AXIS: 97 DEGREES
EKG VENTRICULAR RATE: 126 BPM
EOSINOPHILS ABSOLUTE: 0 E9/L (ref 0.05–0.5)
EOSINOPHILS RELATIVE PERCENT: 0 % (ref 0–6)
FIO2: 50 %
FOLATE: 15.5 NG/ML (ref 4.8–24.2)
GFR AFRICAN AMERICAN: >60
GFR NON-AFRICAN AMERICAN: 51 ML/MIN/1.73
GLUCOSE BLD-MCNC: 88 MG/DL (ref 74–99)
HBA1C MFR BLD: 5 % (ref 4–5.6)
HCO3: 20.8 MMOL/L (ref 22–26)
HCT VFR BLD CALC: 43.2 % (ref 34–48)
HEMOGLOBIN: 14.3 G/DL (ref 11.5–15.5)
HHB: 0.8 % (ref 0–5)
LAB: ABNORMAL
LACTIC ACID: 1.2 MMOL/L (ref 0.5–2.2)
LYMPHOCYTES ABSOLUTE: 0.19 E9/L (ref 1.5–4)
LYMPHOCYTES RELATIVE PERCENT: 0.9 % (ref 20–42)
Lab: ABNORMAL
MCH RBC QN AUTO: 32.6 PG (ref 26–35)
MCHC RBC AUTO-ENTMCNC: 33.1 % (ref 32–34.5)
MCV RBC AUTO: 98.4 FL (ref 80–99.9)
METHB: 0.5 % (ref 0–1.5)
MODE: ABNORMAL
MONOCYTES ABSOLUTE: 0.19 E9/L (ref 0.1–0.95)
MONOCYTES RELATIVE PERCENT: 0.9 % (ref 2–12)
NEUTROPHILS ABSOLUTE: 18.91 E9/L (ref 1.8–7.3)
NEUTROPHILS RELATIVE PERCENT: 98.3 % (ref 43–80)
O2 CONTENT: 20.7 ML/DL
O2 SATURATION: 99.2 % (ref 92–98.5)
O2HB: 96.5 % (ref 94–97)
OPERATOR ID: 30
OVALOCYTES: ABNORMAL
PATIENT TEMP: 37 C
PCO2: 34.7 MMHG (ref 35–45)
PDW BLD-RTO: 14.1 FL (ref 11.5–15)
PEEP/CPAP: 5 CMH2O
PFO2: 5.32 MMHG/%
PH BLOOD GAS: 7.4 (ref 7.35–7.45)
PLATELET # BLD: 301 E9/L (ref 130–450)
PMV BLD AUTO: 10.8 FL (ref 7–12)
PO2: 265.9 MMHG (ref 75–100)
POIKILOCYTES: ABNORMAL
POTASSIUM REFLEX MAGNESIUM: 4.2 MMOL/L (ref 3.5–5)
PRO-BNP: 1966 PG/ML (ref 0–125)
PS: 5 CMH20
RBC # BLD: 4.39 E12/L (ref 3.5–5.5)
RI(T): 0.15
SARS-COV-2, NAAT: NOT DETECTED
SODIUM BLD-SCNC: 138 MMOL/L (ref 132–146)
SOURCE, BLOOD GAS: ABNORMAL
THB: 14.8 G/DL (ref 11.5–16.5)
TIME ANALYZED: 1445
TOTAL PROTEIN: 7.1 G/DL (ref 6.4–8.3)
TROPONIN, HIGH SENSITIVITY: 89 NG/L (ref 0–9)
TROPONIN, HIGH SENSITIVITY: 92 NG/L (ref 0–9)
TSH SERPL DL<=0.05 MIU/L-ACNC: 0.95 UIU/ML (ref 0.27–4.2)
VITAMIN B-12: 479 PG/ML (ref 211–946)
VITAMIN D 25-HYDROXY: 21 NG/ML (ref 30–100)
WBC # BLD: 19.3 E9/L (ref 4.5–11.5)

## 2021-07-02 PROCEDURE — 2060000000 HC ICU INTERMEDIATE R&B

## 2021-07-02 PROCEDURE — 83605 ASSAY OF LACTIC ACID: CPT

## 2021-07-02 PROCEDURE — 2580000003 HC RX 258: Performed by: STUDENT IN AN ORGANIZED HEALTH CARE EDUCATION/TRAINING PROGRAM

## 2021-07-02 PROCEDURE — 99284 EMERGENCY DEPT VISIT MOD MDM: CPT

## 2021-07-02 PROCEDURE — 87635 SARS-COV-2 COVID-19 AMP PRB: CPT

## 2021-07-02 PROCEDURE — 6370000000 HC RX 637 (ALT 250 FOR IP): Performed by: STUDENT IN AN ORGANIZED HEALTH CARE EDUCATION/TRAINING PROGRAM

## 2021-07-02 PROCEDURE — 87040 BLOOD CULTURE FOR BACTERIA: CPT

## 2021-07-02 PROCEDURE — 82607 VITAMIN B-12: CPT

## 2021-07-02 PROCEDURE — 6370000000 HC RX 637 (ALT 250 FOR IP): Performed by: INTERNAL MEDICINE

## 2021-07-02 PROCEDURE — 71275 CT ANGIOGRAPHY CHEST: CPT

## 2021-07-02 PROCEDURE — 93005 ELECTROCARDIOGRAM TRACING: CPT | Performed by: STUDENT IN AN ORGANIZED HEALTH CARE EDUCATION/TRAINING PROGRAM

## 2021-07-02 PROCEDURE — 6360000002 HC RX W HCPCS: Performed by: STUDENT IN AN ORGANIZED HEALTH CARE EDUCATION/TRAINING PROGRAM

## 2021-07-02 PROCEDURE — 6360000002 HC RX W HCPCS: Performed by: INTERNAL MEDICINE

## 2021-07-02 PROCEDURE — 94640 AIRWAY INHALATION TREATMENT: CPT

## 2021-07-02 PROCEDURE — 6360000004 HC RX CONTRAST MEDICATION: Performed by: RADIOLOGY

## 2021-07-02 PROCEDURE — 2500000003 HC RX 250 WO HCPCS: Performed by: STUDENT IN AN ORGANIZED HEALTH CARE EDUCATION/TRAINING PROGRAM

## 2021-07-02 PROCEDURE — 94664 DEMO&/EVAL PT USE INHALER: CPT

## 2021-07-02 PROCEDURE — 96365 THER/PROPH/DIAG IV INF INIT: CPT

## 2021-07-02 PROCEDURE — 82746 ASSAY OF FOLIC ACID SERUM: CPT

## 2021-07-02 PROCEDURE — 36415 COLL VENOUS BLD VENIPUNCTURE: CPT

## 2021-07-02 PROCEDURE — 85025 COMPLETE CBC W/AUTO DIFF WBC: CPT

## 2021-07-02 PROCEDURE — 80053 COMPREHEN METABOLIC PANEL: CPT

## 2021-07-02 PROCEDURE — 83880 ASSAY OF NATRIURETIC PEPTIDE: CPT

## 2021-07-02 PROCEDURE — 83036 HEMOGLOBIN GLYCOSYLATED A1C: CPT

## 2021-07-02 PROCEDURE — 2580000003 HC RX 258: Performed by: INTERNAL MEDICINE

## 2021-07-02 PROCEDURE — 84484 ASSAY OF TROPONIN QUANT: CPT

## 2021-07-02 PROCEDURE — 96375 TX/PRO/DX INJ NEW DRUG ADDON: CPT

## 2021-07-02 PROCEDURE — 82306 VITAMIN D 25 HYDROXY: CPT

## 2021-07-02 PROCEDURE — 82805 BLOOD GASES W/O2 SATURATION: CPT

## 2021-07-02 PROCEDURE — 99233 SBSQ HOSP IP/OBS HIGH 50: CPT | Performed by: INTERNAL MEDICINE

## 2021-07-02 PROCEDURE — 84443 ASSAY THYROID STIM HORMONE: CPT

## 2021-07-02 PROCEDURE — 2700000000 HC OXYGEN THERAPY PER DAY

## 2021-07-02 PROCEDURE — 94660 CPAP INITIATION&MGMT: CPT

## 2021-07-02 RX ORDER — IPRATROPIUM BROMIDE AND ALBUTEROL SULFATE 2.5; .5 MG/3ML; MG/3ML
1 SOLUTION RESPIRATORY (INHALATION)
Status: DISCONTINUED | OUTPATIENT
Start: 2021-07-03 | End: 2021-07-05 | Stop reason: HOSPADM

## 2021-07-02 RX ORDER — ONDANSETRON 2 MG/ML
4 INJECTION INTRAMUSCULAR; INTRAVENOUS EVERY 6 HOURS PRN
Status: DISCONTINUED | OUTPATIENT
Start: 2021-07-02 | End: 2021-07-05 | Stop reason: HOSPADM

## 2021-07-02 RX ORDER — IPRATROPIUM BROMIDE AND ALBUTEROL SULFATE 2.5; .5 MG/3ML; MG/3ML
3 SOLUTION RESPIRATORY (INHALATION) ONCE
Status: COMPLETED | OUTPATIENT
Start: 2021-07-02 | End: 2021-07-02

## 2021-07-02 RX ORDER — GABAPENTIN 400 MG/1
1200 CAPSULE ORAL DAILY
Status: DISCONTINUED | OUTPATIENT
Start: 2021-07-02 | End: 2021-07-05 | Stop reason: HOSPADM

## 2021-07-02 RX ORDER — BUDESONIDE 0.25 MG/2ML
250 INHALANT ORAL 2 TIMES DAILY
Status: DISCONTINUED | OUTPATIENT
Start: 2021-07-02 | End: 2021-07-05 | Stop reason: HOSPADM

## 2021-07-02 RX ORDER — ONDANSETRON 4 MG/1
4 TABLET, ORALLY DISINTEGRATING ORAL EVERY 8 HOURS PRN
Status: DISCONTINUED | OUTPATIENT
Start: 2021-07-02 | End: 2021-07-05 | Stop reason: HOSPADM

## 2021-07-02 RX ORDER — CLONAZEPAM 1 MG/1
1 TABLET ORAL 2 TIMES DAILY
Status: DISCONTINUED | OUTPATIENT
Start: 2021-07-02 | End: 2021-07-05 | Stop reason: HOSPADM

## 2021-07-02 RX ORDER — NICOTINE 21 MG/24HR
1 PATCH, TRANSDERMAL 24 HOURS TRANSDERMAL DAILY
Status: DISCONTINUED | OUTPATIENT
Start: 2021-07-02 | End: 2021-07-05 | Stop reason: HOSPADM

## 2021-07-02 RX ORDER — ACETAMINOPHEN 325 MG/1
650 TABLET ORAL EVERY 6 HOURS PRN
Status: DISCONTINUED | OUTPATIENT
Start: 2021-07-02 | End: 2021-07-05 | Stop reason: HOSPADM

## 2021-07-02 RX ORDER — DOXYCYCLINE HYCLATE 100 MG/1
100 CAPSULE ORAL EVERY 12 HOURS
Status: DISCONTINUED | OUTPATIENT
Start: 2021-07-02 | End: 2021-07-05 | Stop reason: HOSPADM

## 2021-07-02 RX ORDER — BENZTROPINE MESYLATE 1 MG/1
1 TABLET ORAL 2 TIMES DAILY
Status: DISCONTINUED | OUTPATIENT
Start: 2021-07-02 | End: 2021-07-05 | Stop reason: HOSPADM

## 2021-07-02 RX ORDER — ACETAMINOPHEN 650 MG/1
650 SUPPOSITORY RECTAL EVERY 6 HOURS PRN
Status: DISCONTINUED | OUTPATIENT
Start: 2021-07-02 | End: 2021-07-05 | Stop reason: HOSPADM

## 2021-07-02 RX ORDER — SODIUM CHLORIDE 9 MG/ML
25 INJECTION, SOLUTION INTRAVENOUS PRN
Status: DISCONTINUED | OUTPATIENT
Start: 2021-07-02 | End: 2021-07-05 | Stop reason: HOSPADM

## 2021-07-02 RX ORDER — MAGNESIUM SULFATE IN WATER 40 MG/ML
2000 INJECTION, SOLUTION INTRAVENOUS ONCE
Status: COMPLETED | OUTPATIENT
Start: 2021-07-02 | End: 2021-07-02

## 2021-07-02 RX ORDER — IBUPROFEN 400 MG/1
400 TABLET ORAL EVERY 6 HOURS PRN
Status: DISCONTINUED | OUTPATIENT
Start: 2021-07-02 | End: 2021-07-05 | Stop reason: HOSPADM

## 2021-07-02 RX ORDER — PREDNISONE 20 MG/1
40 TABLET ORAL DAILY
Status: DISCONTINUED | OUTPATIENT
Start: 2021-07-05 | End: 2021-07-05 | Stop reason: HOSPADM

## 2021-07-02 RX ORDER — POLYETHYLENE GLYCOL 3350 17 G/17G
17 POWDER, FOR SOLUTION ORAL DAILY PRN
Status: DISCONTINUED | OUTPATIENT
Start: 2021-07-02 | End: 2021-07-05 | Stop reason: HOSPADM

## 2021-07-02 RX ORDER — METHYLPREDNISOLONE SODIUM SUCCINATE 40 MG/ML
40 INJECTION, POWDER, LYOPHILIZED, FOR SOLUTION INTRAMUSCULAR; INTRAVENOUS EVERY 6 HOURS
Status: COMPLETED | OUTPATIENT
Start: 2021-07-02 | End: 2021-07-04

## 2021-07-02 RX ORDER — SODIUM CHLORIDE 0.9 % (FLUSH) 0.9 %
5-40 SYRINGE (ML) INJECTION PRN
Status: DISCONTINUED | OUTPATIENT
Start: 2021-07-02 | End: 2021-07-05 | Stop reason: HOSPADM

## 2021-07-02 RX ORDER — BUSPIRONE HYDROCHLORIDE 5 MG/1
10 TABLET ORAL 2 TIMES DAILY
Status: DISCONTINUED | OUTPATIENT
Start: 2021-07-02 | End: 2021-07-05 | Stop reason: HOSPADM

## 2021-07-02 RX ORDER — SODIUM CHLORIDE 0.9 % (FLUSH) 0.9 %
5-40 SYRINGE (ML) INJECTION EVERY 12 HOURS SCHEDULED
Status: DISCONTINUED | OUTPATIENT
Start: 2021-07-02 | End: 2021-07-05 | Stop reason: HOSPADM

## 2021-07-02 RX ORDER — LORAZEPAM 2 MG/ML
0.5 INJECTION INTRAMUSCULAR ONCE
Status: COMPLETED | OUTPATIENT
Start: 2021-07-02 | End: 2021-07-02

## 2021-07-02 RX ORDER — FLUOXETINE HYDROCHLORIDE 20 MG/1
20 CAPSULE ORAL DAILY
Status: DISCONTINUED | OUTPATIENT
Start: 2021-07-02 | End: 2021-07-05 | Stop reason: HOSPADM

## 2021-07-02 RX ADMIN — BUDESONIDE 250 MCG: 0.25 INHALANT RESPIRATORY (INHALATION) at 21:06

## 2021-07-02 RX ADMIN — DOXYCYCLINE 100 MG: 100 INJECTION, POWDER, LYOPHILIZED, FOR SOLUTION INTRAVENOUS at 16:58

## 2021-07-02 RX ADMIN — IPRATROPIUM BROMIDE AND ALBUTEROL SULFATE 1 AMPULE: .5; 3 SOLUTION RESPIRATORY (INHALATION) at 21:06

## 2021-07-02 RX ADMIN — IPRATROPIUM BROMIDE AND ALBUTEROL SULFATE 3 AMPULE: .5; 3 SOLUTION RESPIRATORY (INHALATION) at 13:35

## 2021-07-02 RX ADMIN — GABAPENTIN 1200 MG: 400 CAPSULE ORAL at 21:30

## 2021-07-02 RX ADMIN — LURASIDONE HYDROCHLORIDE 40 MG: 40 TABLET, FILM COATED ORAL at 22:09

## 2021-07-02 RX ADMIN — METHYLPREDNISOLONE SODIUM SUCCINATE 40 MG: 40 INJECTION, POWDER, FOR SOLUTION INTRAMUSCULAR; INTRAVENOUS at 21:31

## 2021-07-02 RX ADMIN — IOPAMIDOL 75 ML: 755 INJECTION, SOLUTION INTRAVENOUS at 15:23

## 2021-07-02 RX ADMIN — BENZTROPINE MESYLATE 1 MG: 1 TABLET ORAL at 22:09

## 2021-07-02 RX ADMIN — BUSPIRONE HYDROCHLORIDE 10 MG: 5 TABLET ORAL at 21:31

## 2021-07-02 RX ADMIN — ENOXAPARIN SODIUM 40 MG: 40 INJECTION SUBCUTANEOUS at 21:31

## 2021-07-02 RX ADMIN — DOXYCYCLINE HYCLATE 100 MG: 100 CAPSULE ORAL at 21:31

## 2021-07-02 RX ADMIN — FLUOXETINE 20 MG: 20 CAPSULE ORAL at 21:31

## 2021-07-02 RX ADMIN — CEFTRIAXONE SODIUM 1000 MG: 1 INJECTION, POWDER, FOR SOLUTION INTRAMUSCULAR; INTRAVENOUS at 16:57

## 2021-07-02 RX ADMIN — SODIUM CHLORIDE, PRESERVATIVE FREE 10 ML: 5 INJECTION INTRAVENOUS at 21:32

## 2021-07-02 RX ADMIN — CLONAZEPAM 1 MG: 1 TABLET ORAL at 21:31

## 2021-07-02 RX ADMIN — MAGNESIUM SULFATE HEPTAHYDRATE 2000 MG: 40 INJECTION, SOLUTION INTRAVENOUS at 13:57

## 2021-07-02 RX ADMIN — LORAZEPAM 0.5 MG: 2 INJECTION INTRAMUSCULAR; INTRAVENOUS at 14:53

## 2021-07-02 ASSESSMENT — ENCOUNTER SYMPTOMS
DIARRHEA: 0
SORE THROAT: 0
SHORTNESS OF BREATH: 1
VOMITING: 0
EYE REDNESS: 0
TACHYPNEA: 1
WHEEZING: 1
BACK PAIN: 0
SINUS PRESSURE: 0
NAUSEA: 0
COUGH: 0
EYE PAIN: 0
EYE DISCHARGE: 0
ABDOMINAL DISTENTION: 0

## 2021-07-02 ASSESSMENT — PAIN SCALES - GENERAL: PAINLEVEL_OUTOF10: 0

## 2021-07-02 NOTE — Clinical Note
Patient Class: Inpatient [101]   REQUIRED: Diagnosis: Acute respiratory failure with hypoxia [facility] [[id number]]   Estimated Length of Stay: Estimated stay of more than 2 midnights   Future Attending Provider: Eder Felix [[id number]]   Admitting Provider: Eder Felix [[id number]]   Telemetry/Cardiac Monitoring Required?: Yes

## 2021-07-02 NOTE — ED PROVIDER NOTES
Chief Complaint   Patient presents with    Respiratory Distress     100% on c-pap, SOB x 2 days. 125 solu med given en route.  Chest Pain     cp started last night, still c/o of this today. Patient is a 79-year-old female with a history of COPD who presents today for chest pain and shortness of breath. Symptoms started earlier today. She is not working at baseline. Symptoms are worse when she is up moving around. Patient was given 125 Solu-Medrol in route. Patient was placed on BiPAP on arrival.  O2 saturations are stable on BiPAP. She has been trying her inhalers at home without improvement of her symptoms. Denies history of travel or surgery. No cough, fevers or chills. No lightheadedness or dizziness. No history of DVT or PE. She is not on blood thinning medication. No history of Covid. The history is provided by the patient. No  was used. Review of Systems   Constitutional: Positive for fatigue. Negative for chills and fever. HENT: Negative for ear pain, sinus pressure and sore throat. Eyes: Negative for pain, discharge and redness. Respiratory: Positive for shortness of breath and wheezing. Negative for cough. Cardiovascular: Negative for chest pain, palpitations and leg swelling. Gastrointestinal: Negative for abdominal distention, diarrhea, nausea and vomiting. Genitourinary: Negative for dysuria and frequency. Musculoskeletal: Negative for arthralgias and back pain. Skin: Negative for rash and wound. Neurological: Negative for weakness and headaches. Hematological: Negative for adenopathy. All other systems reviewed and are negative. Physical Exam  Vitals and nursing note reviewed. Constitutional:       General: She is in acute distress. Appearance: She is well-developed. She is ill-appearing. HENT:      Head: Normocephalic and atraumatic. Eyes:      Pupils: Pupils are equal, round, and reactive to light. Cardiovascular:      Rate and Rhythm: Regular rhythm. Tachycardia present. Pulses: Normal pulses. Heart sounds: Normal heart sounds. No murmur heard. Pulmonary:      Effort: Respiratory distress present. Breath sounds: Wheezing present. No rales. Comments: Tachypneic, uncomfortable appearing  Diminished throughout  Abdominal:      General: Bowel sounds are normal.      Palpations: Abdomen is soft. Tenderness: There is no abdominal tenderness. There is no guarding or rebound. Musculoskeletal:         General: No tenderness. Cervical back: Normal range of motion and neck supple. Right lower leg: No edema. Left lower leg: No edema. Skin:     General: Skin is warm and dry. Capillary Refill: Capillary refill takes less than 2 seconds. Neurological:      General: No focal deficit present. Mental Status: She is alert and oriented to person, place, and time. Cranial Nerves: No cranial nerve deficit.       Coordination: Coordination normal.          Procedures     Labs Reviewed   CBC WITH AUTO DIFFERENTIAL - Abnormal; Notable for the following components:       Result Value    WBC 19.3 (*)     Neutrophils % 98.3 (*)     Lymphocytes % 0.9 (*)     Monocytes % 0.9 (*)     Neutrophils Absolute 18.91 (*)     Lymphocytes Absolute 0.19 (*)     Eosinophils Absolute 0.00 (*)     All other components within normal limits   COMPREHENSIVE METABOLIC PANEL W/ REFLEX TO MG FOR LOW K - Abnormal; Notable for the following components:    CO2 21 (*)     Anion Gap 17 (*)     CREATININE 1.1 (*)     Total Bilirubin 1.3 (*)     All other components within normal limits   BRAIN NATRIURETIC PEPTIDE - Abnormal; Notable for the following components:    Pro-BNP 1,966 (*)     All other components within normal limits   TROPONIN - Abnormal; Notable for the following components:    Troponin, High Sensitivity 89 (*)     All other components within normal limits   BLOOD GAS, ARTERIAL - Abnormal; Notable for the following components:    PCO2 34.7 (*)     PO2 265.9 (*)     HCO3 20.8 (*)     B.E. -3.2 (*)     O2 Sat 99.2 (*)     COHb 2.2 (*)     All other components within normal limits   TROPONIN - Abnormal; Notable for the following components:    Troponin, High Sensitivity 92 (*)     All other components within normal limits   COVID-19, RAPID   CULTURE, BLOOD 1   CULTURE, BLOOD 2   ARTERIAL BLOOD GAS, RESPIRATORY ONLY     CTA PULMONARY W CONTRAST   Final Result   No evidence of pulmonary emboli. Faint multifocal ground-glass opacities in the right lower lobe, compatible   with pneumonia. Recommend follow-up to resolution. EKG #1:  I personally interpreted this EKG  Time:  1517    Rate: 126  Rhythm: Sinus. Interpretation: Sinus tachycardia, normal axis, no ST elevation, no T inversion. MDM  Number of Diagnoses or Management Options  Diagnosis management comments: Patient is a 77-year-old female presents today for acute shortness of breath. EMS noted her to be hypoxic on arrival, she arrives to the ER with CPAP. Breath sounds are diminished. She is tachypneic and tachycardic on arrival.  She is placed on BiPAP. Lab work was obtained. ABG shows PCO2 34, O2 is elevated, bicarb 20.8. Covid test is negative, troponin of 89, delta of 92. EKG shows no acute ischemic changes, this is unlikely ACS in etiology. Patient was given Solu-Medrol prior to arrival, she was given duo nebs and magnesium in the department for COPD exacerbation. CTA was obtained as she was tachycardic and hypoxic. CTA shows no evidence of PE, however they do note new infiltrates. She has elevated white count of 19,000. Patient also has a dry nonproductive cough. Patient was given IV antibiotics, she will be admitted to the hospital for COPD exacerbation with associated pneumonia. She was converted from BiPAP to 1 L nasal cannula.        Amount and/or Complexity of Data Reviewed  Clinical lab tests: reviewed             ED Course as of Jul 02 1629 Fri Jul 02, 2021   1344   ATTENDING PROVIDER ATTESTATION:     I have personally performed and/or participated in the history, exam, medical decision making, and procedures and agree with all pertinent clinical information unless otherwise noted. I have also reviewed and agree with the past medical, family and social history unless otherwise noted. I have discussed this patient in detail with the resident and provided the instruction and education regarding the evidence-based evaluation and treatment of chest pain/SOB. History: patient presents with acute on chronic SOB. She states it was significantly worse the past few days. She is coughing and at times has felt feverish. No edema or pain of the lower extremities. She states there is sharp discomfort in her chest with occasional pressure. My findings: Asa Aneesh is a 63 y.o. female whom is in moderate distress. Physical exam reveals tachypnea and tachycardia with use of accessory muscles. She is on bipap from EMS. Given solumedrol in route. No breathing treatments. She is a smoker. No heart disease. My plan: Symptomatic and supportive care. Will provide Bipap, duonebs, labs and imaging. Critical care 30 minutes. Electronically signed by Lazarus Nail, DO on 7/2/21 at 1:44 PM EDT          [JS]   7061 Patient is off BiPAP, converted to 1 L nasal cannula. [JH]      ED Course User Index  [] Pam Salmon DO  [] Lazarus Nail, DO       --------------------------------------------- PAST HISTORY ---------------------------------------------  Past Medical History:  has a past medical history of Alcoholism ([facility] [address] COPD (chronic obstructive pulmonary disease) ([facility] [address] Dental caries, Hypertension, Lung nodules, Schizophrenia ([facility] [address] and Ventricular hypokinesis. Past Surgical History:  has a past surgical history that includes Echo Complete (1/10/2014);  Appendectomy;  section; Wrist surgery (Right); shoulder surgery (Right); and pr dental surgery procedure (N/A, 2018). Social History:  reports that she has been smoking. She has a 42.00 pack-year smoking history. She has never used smokeless tobacco. She reports current alcohol use. She reports that she does not use drugs. Family History: family history includes Cancer in her father; Other in her mother. The patients home medications have been reviewed.     Allergies: Codeine, Dust mite extract, Aspirin, and Penicillins    -------------------------------------------------- RESULTS -------------------------------------------------    LABS:  Results for orders placed or performed during the hospital encounter of 21   COVID-19, Rapid    Specimen: Nasopharyngeal Swab   Result Value Ref Range    SARS-CoV-2, NAAT Not Detected Not Detected   CBC Auto Differential   Result Value Ref Range    WBC 19.3 (H) 4.5 - 11.5 E9/L    RBC 4.39 3.50 - 5.50 E12/L    Hemoglobin 14.3 11.5 - 15.5 g/dL    Hematocrit 43.2 34.0 - 48.0 %    MCV 98.4 80.0 - 99.9 fL    MCH 32.6 26.0 - 35.0 pg    MCHC 33.1 32.0 - 34.5 %    RDW 14.1 11.5 - 15.0 fL    Platelets 600 069 - 311 E9/L    MPV 10.8 7.0 - 12.0 fL    Neutrophils % 98.3 (H) 43.0 - 80.0 %    Lymphocytes % 0.9 (L) 20.0 - 42.0 %    Monocytes % 0.9 (L) 2.0 - 12.0 %    Eosinophils % 0.0 0.0 - 6.0 %    Basophils % 0.2 0.0 - 2.0 %    Neutrophils Absolute 18.91 (H) 1.80 - 7.30 E9/L    Lymphocytes Absolute 0.19 (L) 1.50 - 4.00 E9/L    Monocytes Absolute 0.19 0.10 - 0.95 E9/L    Eosinophils Absolute 0.00 (L) 0.05 - 0.50 E9/L    Basophils Absolute 0.00 0.00 - 0.20 E9/L    Poikilocytes 1+     Cassie Cells 1+     Ovalocytes 1+    Comprehensive Metabolic Panel w/ Reflex to MG   Result Value Ref Range    Sodium 138 132 - 146 mmol/L    Potassium reflex Magnesium 4.2 3.5 - 5.0 mmol/L    Chloride 100 98 - 107 mmol/L    CO2 21 (L) 22 - 29 mmol/L    Anion Gap 17 (H) 7 - 16 mmol/L    Glucose 88 74 - 99 mg/dL    BUN 14 6 - 20 mg/dL    CREATININE 1.1 (H) 0.5 - 1.0 mg/dL    GFR Non-African American 51 >=60 mL/min/1.73    GFR African American >60     Calcium 9.2 8.6 - 10.2 mg/dL    Total Protein 7.1 6.4 - 8.3 g/dL    Albumin 3.9 3.5 - 5.2 g/dL    Total Bilirubin 1.3 (H) 0.0 - 1.2 mg/dL    Alkaline Phosphatase 100 35 - 104 U/L    ALT 8 0 - 32 U/L    AST 16 0 - 31 U/L   Brain Natriuretic Peptide   Result Value Ref Range    Pro-BNP 1,966 (H) 0 - 125 pg/mL   Troponin   Result Value Ref Range    Troponin, High Sensitivity 89 (H) 0 - 9 ng/L   Blood Gas, Arterial   Result Value Ref Range    Date Analyzed [id number]     Time Analyzed 1445     Source: Blood Arterial     pH, Blood Gas 7.396 7.350 - 7.450    PCO2 34.7 (L) 35.0 - 45.0 mmHg    PO2 265.9 (H) 75.0 - 100.0 mmHg    HCO3 20.8 (L) 22.0 - 26.0 mmol/L    B.E. -3.2 (L) -3.0 - 3.0 mmol/L    O2 Sat 99.2 (H) 92.0 - 98.5 %    PO2/FIO2 5.32 mmHg/%    RI(T) 0.15     O2Hb 96.5 94.0 - 97.0 %    COHb 2.2 (H) 0.0 - 1.5 %    MetHb 0.5 0.0 - 1.5 %    O2 Content 20.7 mL/dL    HHb 0.8 0.0 - 5.0 %    tHb (est) 14.8 11.5 - 16.5 g/dL    Mode BIPAP     FIO2 50.0 %    Peep/Cpap 5.0 cmH2O    PS 5 cmH20    Date Of Collection      Time Collected      Pt Temp 37.0 C     ID 30     Lab [id number]     Critical(s) Notified . No Critical Values    Troponin   Result Value Ref Range    Troponin, High Sensitivity 92 (H) 0 - 9 ng/L   EKG 12 Lead   Result Value Ref Range    Ventricular Rate 126 BPM    Atrial Rate 127 BPM    QRS Duration 86 ms    Q-T Interval 338 ms    QTc Calculation (Bazett) 489 ms    R Axis 78 degrees    T Axis 97 degrees       RADIOLOGY:  CTA PULMONARY W CONTRAST   Final Result   No evidence of pulmonary emboli. Faint multifocal ground-glass opacities in the right lower lobe, compatible   with pneumonia.   Recommend follow-up to resolution.                 ------------------------- NURSING NOTES AND VITALS REVIEWED ---------------------------  Date / Time Roomed:  7/2/2021 1:25 PM  ED Bed Assignment:  04/04    The nursing notes within the ED encounter and vital signs as below have been reviewed. Patient Vitals for the past 24 hrs:   BP Pulse Resp SpO2   07/02/21 1458 (!) 141/89 128 26 100 %   07/02/21 1328 139/87 139 30 100 %       Oxygen Saturation Interpretation: Normal    ------------------------------------------ PROGRESS NOTES ------------------------------------------    Counseling:  I have spoken with the patient and discussed todays results, in addition to providing specific details for the plan of care and counseling regarding the diagnosis and prognosis. Their questions are answered at this time and they are agreeable with the plan of admission.    --------------------------------- ADDITIONAL PROVIDER NOTES ---------------------------------  Consultations:  Spoke with Dr. Marino Trimble.  Discussed case. They will admit the patient. This patient's ED course included: a personal history and physicial examination    This patient has remained hemodynamically stable during their ED course. Medications   cefTRIAXone (ROCEPHIN) 1,000 mg in sterile water 10 mL IV syringe (has no administration in time range)   doxycycline (VIBRAMYCIN) 100 mg in dextrose 5 % 100 mL IVPB (has no administration in time range)   ipratropium-albuterol (DUONEB) nebulizer solution 3 ampule (3 ampules Inhalation Given 7/2/21 1335)   magnesium sulfate 2000 mg in 50 mL IVPB premix (0 mg Intravenous Stopped 7/2/21 1456)   LORazepam (ATIVAN) injection 0.5 mg (0.5 mg Intravenous Given 7/2/21 1453)   iopamidol (ISOVUE-370) 76 % injection 75 mL (75 mLs Intravenous Given 7/2/21 1523)         Diagnosis:  1. Acute respiratory failure with hypoxia ([address]    2. COPD exacerbation ([facility] [address]    3. Pneumonia of right lung due to infectious organism, unspecified part of lung        Disposition:  Patient's disposition: Admit to telemetry  Patient's condition is stable.              Vidya Hendrix DO  Resident  07/02/21 7364

## 2021-07-02 NOTE — H&P
Department of Internal Medicine  General Internal Medicine  Attending History and Physical      CHIEF COMPLAINT:  Shortness of breath and chest tightness    Reason for Admission:  PNA    History Obtained From:  patient    HISTORY OF PRESENT ILLNESS:      The patient is a 63 y.o. female with significant past medical history of COPD and Depression who presents with dyspnea and chest tightness. She denied fever. No chest pain. She denied nausea and vomiting. In the ER, she was noted to be wheezing and in respiratory distress. She was notably treated with breathing treatment and Bipap and IV abx. She appears more comfortable. She is being admitted for further medical attention. Past Medical History:        Diagnosis Date    Alcoholism [facility])     H/O    COPD (chronic obstructive pulmonary disease) ([facility] [address]     Dental caries     Hypertension     no meds    [place] nodules      [place]  [facility]     stable    Ventricular hypokinesis     H/O     Past Surgical History:        Procedure Laterality Date    APPENDECTOMY       SECTION      2 C SECTION    ECHO COMPLETE  1/10/2014         [place] DENTAL SURGERY PROCEDURE N/A 2018    MULTIPLE DENTAL EXTRACTIONS AND ALVELOPLASTY performed by Sara Lindsey DDS at [address] [place] [address] Right     WRIST SURGERY Right      Medications Prior to Admission:    Not in a hospital admission. Allergies:  Codeine, Dust mite extract, Aspirin, and Penicillins    Social History:   TOBACCO:   reports that she has been smoking. She has a 42.00 pack-year smoking history.  She has never used smokeless tobacco.    Family History:       Problem Relation Age of Onset    Other Mother         schizophrenia    Cancer Father      REVIEW OF SYSTEMS:  CONSTITUTIONAL:  positive for  fevers  EYES:  negative for  contacts and glasses  HEENT:  negative for  hearing loss and tinnitus  RESPIRATORY:  positive for  dyspnea and wheezing  CARDIOVASCULAR: 10.8 07/02/2021     BMP:    Lab Results   Component Value Date     07/02/2021    K 4.2 07/02/2021     07/02/2021    CO2 21 07/02/2021    BUN 14 07/02/2021    LABALBU 3.9 07/02/2021    CREATININE 1.1 07/02/2021    CALCIUM 9.2 07/02/2021    GFRAA >60 07/02/2021    LABGLOM 51 07/02/2021    GLUCOSE 88 07/02/2021     ASSESSMENT AND PLAN:        1. Acute respiratory failure with hypoxia:   Much improved  Continue supplemental oxygen and NIPPV support as needed    2. COPD Exacerbation:  Started on breathing treatment  Solu-medrol followed by prednisone    3. PNA: continue ceftriaxone and Doxy  Suspects that mucus plug may be contributing.  mucinex and acapella added    4. Sepsis due to #3. Wbc elevation, fever and tachycardia. 3/4  Management as above    5.  Depression: on latuda

## 2021-07-02 NOTE — [facility] REMOTE MONITORING
Spoke with primary RN Angelo Arrieta regarding 3-hour Sepsis bundle.       Thank you,     NIELS ArmstrongN, RN, [facility]  [address] [facility]  Callback:[phone]

## 2021-07-03 LAB
ALBUMIN SERPL-MCNC: 3.5 G/DL (ref 3.5–5.2)
ALP BLD-CCNC: 102 U/L (ref 35–104)
ALT SERPL-CCNC: 7 U/L (ref 0–32)
AMPHETAMINE SCREEN, URINE: NOT DETECTED
ANION GAP SERPL CALCULATED.3IONS-SCNC: 9 MMOL/L (ref 7–16)
AST SERPL-CCNC: 15 U/L (ref 0–31)
B.E.: -0.2 MMOL/L (ref -3–3)
BARBITURATE SCREEN URINE: NOT DETECTED
BASOPHILS ABSOLUTE: 0.01 E9/L (ref 0–0.2)
BASOPHILS RELATIVE PERCENT: 0.1 % (ref 0–2)
BENZODIAZEPINE SCREEN, URINE: NOT DETECTED
BILIRUB SERPL-MCNC: 0.5 MG/DL (ref 0–1.2)
BUN BLDV-MCNC: 22 MG/DL (ref 6–20)
BURR CELLS: ABNORMAL
CALCIUM SERPL-MCNC: 8.9 MG/DL (ref 8.6–10.2)
CANNABINOID SCREEN URINE: NOT DETECTED
CHLORIDE BLD-SCNC: 101 MMOL/L (ref 98–107)
CO2: 24 MMOL/L (ref 22–29)
COCAINE METABOLITE SCREEN URINE: POSITIVE
COHB: 0.1 % (ref 0–1.5)
CREAT SERPL-MCNC: 0.9 MG/DL (ref 0.5–1)
CRITICAL: ABNORMAL
DATE ANALYZED: ABNORMAL
DATE OF COLLECTION: ABNORMAL
EOSINOPHILS ABSOLUTE: 0 E9/L (ref 0.05–0.5)
EOSINOPHILS RELATIVE PERCENT: 0 % (ref 0–6)
FENTANYL SCREEN, URINE: NOT DETECTED
GFR AFRICAN AMERICAN: >60
GFR NON-AFRICAN AMERICAN: >60 ML/MIN/1.73
GLUCOSE BLD-MCNC: 156 MG/DL (ref 74–99)
HCO3: 24.7 MMOL/L (ref 22–26)
HCT VFR BLD CALC: 40 % (ref 34–48)
HEMOGLOBIN: 13.2 G/DL (ref 11.5–15.5)
HHB: 5.4 % (ref 0–5)
IMMATURE GRANULOCYTES #: 0.04 E9/L
IMMATURE GRANULOCYTES %: 0.3 % (ref 0–5)
LAB: ABNORMAL
LYMPHOCYTES ABSOLUTE: 0.43 E9/L (ref 1.5–4)
LYMPHOCYTES RELATIVE PERCENT: 3.2 % (ref 20–42)
Lab: ABNORMAL
Lab: ABNORMAL
MCH RBC QN AUTO: 32 PG (ref 26–35)
MCHC RBC AUTO-ENTMCNC: 33 % (ref 32–34.5)
MCV RBC AUTO: 96.9 FL (ref 80–99.9)
METHADONE SCREEN, URINE: NOT DETECTED
METHB: 0.3 % (ref 0–1.5)
MODE: ABNORMAL
MONOCYTES ABSOLUTE: 0.35 E9/L (ref 0.1–0.95)
MONOCYTES RELATIVE PERCENT: 2.6 % (ref 2–12)
NEUTROPHILS ABSOLUTE: 12.69 E9/L (ref 1.8–7.3)
NEUTROPHILS RELATIVE PERCENT: 93.8 % (ref 43–80)
O2 CONTENT: 18.3 ML/DL
O2 SATURATION: 94.6 % (ref 92–98.5)
O2HB: 94.2 % (ref 94–97)
OPERATOR ID: 797
OPIATE SCREEN URINE: NOT DETECTED
OXYCODONE URINE: NOT DETECTED
PATIENT TEMP: 37 C
PCO2: 41.3 MMHG (ref 35–45)
PDW BLD-RTO: 14.1 FL (ref 11.5–15)
PH BLOOD GAS: 7.39 (ref 7.35–7.45)
PHENCYCLIDINE SCREEN URINE: NOT DETECTED
PLATELET # BLD: 277 E9/L (ref 130–450)
PMV BLD AUTO: 11.2 FL (ref 7–12)
PO2: 74.3 MMHG (ref 75–100)
POIKILOCYTES: ABNORMAL
POTASSIUM REFLEX MAGNESIUM: 4 MMOL/L (ref 3.5–5)
RBC # BLD: 4.13 E12/L (ref 3.5–5.5)
SODIUM BLD-SCNC: 134 MMOL/L (ref 132–146)
SOURCE, BLOOD GAS: ABNORMAL
THB: 13.8 G/DL (ref 11.5–16.5)
TIME ANALYZED: 1100
TOTAL PROTEIN: 6.6 G/DL (ref 6.4–8.3)
WBC # BLD: 13.5 E9/L (ref 4.5–11.5)

## 2021-07-03 PROCEDURE — 97530 THERAPEUTIC ACTIVITIES: CPT | Performed by: PHYSICAL THERAPIST

## 2021-07-03 PROCEDURE — 80053 COMPREHEN METABOLIC PANEL: CPT

## 2021-07-03 PROCEDURE — 6360000002 HC RX W HCPCS: Performed by: INTERNAL MEDICINE

## 2021-07-03 PROCEDURE — 99233 SBSQ HOSP IP/OBS HIGH 50: CPT | Performed by: INTERNAL MEDICINE

## 2021-07-03 PROCEDURE — 94761 N-INVAS EAR/PLS OXIMETRY MLT: CPT

## 2021-07-03 PROCEDURE — 2580000003 HC RX 258: Performed by: INTERNAL MEDICINE

## 2021-07-03 PROCEDURE — 87070 CULTURE OTHR SPECIMN AEROBIC: CPT

## 2021-07-03 PROCEDURE — 97161 PT EVAL LOW COMPLEX 20 MIN: CPT | Performed by: PHYSICAL THERAPIST

## 2021-07-03 PROCEDURE — 2700000000 HC OXYGEN THERAPY PER DAY

## 2021-07-03 PROCEDURE — 6370000000 HC RX 637 (ALT 250 FOR IP): Performed by: INTERNAL MEDICINE

## 2021-07-03 PROCEDURE — 85025 COMPLETE CBC W/AUTO DIFF WBC: CPT

## 2021-07-03 PROCEDURE — 80307 DRUG TEST PRSMV CHEM ANLYZR: CPT

## 2021-07-03 PROCEDURE — 36415 COLL VENOUS BLD VENIPUNCTURE: CPT

## 2021-07-03 PROCEDURE — 82805 BLOOD GASES W/O2 SATURATION: CPT

## 2021-07-03 PROCEDURE — 87147 CULTURE TYPE IMMUNOLOGIC: CPT

## 2021-07-03 PROCEDURE — 36600 WITHDRAWAL OF ARTERIAL BLOOD: CPT

## 2021-07-03 PROCEDURE — 94640 AIRWAY INHALATION TREATMENT: CPT

## 2021-07-03 PROCEDURE — 87206 SMEAR FLUORESCENT/ACID STAI: CPT

## 2021-07-03 PROCEDURE — 2060000000 HC ICU INTERMEDIATE R&B

## 2021-07-03 RX ADMIN — FLUOXETINE 20 MG: 20 CAPSULE ORAL at 08:35

## 2021-07-03 RX ADMIN — CLONAZEPAM 1 MG: 1 TABLET ORAL at 08:36

## 2021-07-03 RX ADMIN — ENOXAPARIN SODIUM 40 MG: 40 INJECTION SUBCUTANEOUS at 08:36

## 2021-07-03 RX ADMIN — IPRATROPIUM BROMIDE AND ALBUTEROL SULFATE 1 AMPULE: .5; 3 SOLUTION RESPIRATORY (INHALATION) at 05:24

## 2021-07-03 RX ADMIN — METHYLPREDNISOLONE SODIUM SUCCINATE 40 MG: 40 INJECTION, POWDER, FOR SOLUTION INTRAMUSCULAR; INTRAVENOUS at 21:02

## 2021-07-03 RX ADMIN — BUDESONIDE 250 MCG: 0.25 INHALANT RESPIRATORY (INHALATION) at 17:59

## 2021-07-03 RX ADMIN — SODIUM CHLORIDE, PRESERVATIVE FREE 10 ML: 5 INJECTION INTRAVENOUS at 21:02

## 2021-07-03 RX ADMIN — IBUPROFEN 400 MG: 400 TABLET, FILM COATED ORAL at 13:00

## 2021-07-03 RX ADMIN — DOXYCYCLINE HYCLATE 100 MG: 100 CAPSULE ORAL at 21:01

## 2021-07-03 RX ADMIN — CEFTRIAXONE SODIUM 1000 MG: 1 INJECTION, POWDER, FOR SOLUTION INTRAMUSCULAR; INTRAVENOUS at 08:36

## 2021-07-03 RX ADMIN — METHYLPREDNISOLONE SODIUM SUCCINATE 40 MG: 40 INJECTION, POWDER, FOR SOLUTION INTRAMUSCULAR; INTRAVENOUS at 15:21

## 2021-07-03 RX ADMIN — CLONAZEPAM 1 MG: 1 TABLET ORAL at 21:01

## 2021-07-03 RX ADMIN — DOXYCYCLINE HYCLATE 100 MG: 100 CAPSULE ORAL at 08:35

## 2021-07-03 RX ADMIN — IPRATROPIUM BROMIDE AND ALBUTEROL SULFATE 1 AMPULE: .5; 3 SOLUTION RESPIRATORY (INHALATION) at 14:00

## 2021-07-03 RX ADMIN — LURASIDONE HYDROCHLORIDE 40 MG: 40 TABLET, FILM COATED ORAL at 08:36

## 2021-07-03 RX ADMIN — BUDESONIDE 250 MCG: 0.25 INHALANT RESPIRATORY (INHALATION) at 05:24

## 2021-07-03 RX ADMIN — SODIUM CHLORIDE, PRESERVATIVE FREE 10 ML: 5 INJECTION INTRAVENOUS at 08:36

## 2021-07-03 RX ADMIN — IPRATROPIUM BROMIDE AND ALBUTEROL SULFATE 1 AMPULE: .5; 3 SOLUTION RESPIRATORY (INHALATION) at 17:58

## 2021-07-03 RX ADMIN — BUSPIRONE HYDROCHLORIDE 10 MG: 5 TABLET ORAL at 21:02

## 2021-07-03 RX ADMIN — GABAPENTIN 1200 MG: 400 CAPSULE ORAL at 08:35

## 2021-07-03 RX ADMIN — METHYLPREDNISOLONE SODIUM SUCCINATE 40 MG: 40 INJECTION, POWDER, FOR SOLUTION INTRAMUSCULAR; INTRAVENOUS at 08:36

## 2021-07-03 RX ADMIN — IPRATROPIUM BROMIDE AND ALBUTEROL SULFATE 1 AMPULE: .5; 3 SOLUTION RESPIRATORY (INHALATION) at 09:38

## 2021-07-03 RX ADMIN — BENZTROPINE MESYLATE 1 MG: 1 TABLET ORAL at 08:35

## 2021-07-03 RX ADMIN — BENZTROPINE MESYLATE 1 MG: 1 TABLET ORAL at 21:02

## 2021-07-03 RX ADMIN — BUSPIRONE HYDROCHLORIDE 10 MG: 5 TABLET ORAL at 08:36

## 2021-07-03 RX ADMIN — METHYLPREDNISOLONE SODIUM SUCCINATE 40 MG: 40 INJECTION, POWDER, FOR SOLUTION INTRAMUSCULAR; INTRAVENOUS at 03:48

## 2021-07-03 ASSESSMENT — PAIN SCALES - GENERAL
PAINLEVEL_OUTOF10: 0
PAINLEVEL_OUTOF10: 4

## 2021-07-03 NOTE — PROGRESS NOTES
Pt awake & alert at this time. Able to remain alert. She states that prior to coming to hospital she had family visiting and did not sleep for 3-4 days straight, she was \"very tired. \"

## 2021-07-03 NOTE — PROGRESS NOTES
Physical Therapy Initial Evaluation/Plan of Care    Room #:  6630/7088-23  Patient Name: Aj Botello  YOB: 1960  MRN: [id number]    Date of Service: 7/3/2021     Tentative placement recommendation: Subacute vs Home Health Physical Therapy if patient meets goals  Equipment recommendation:  To be determined      Evaluating Physical Therapist: Rohith Higgins, PT, DPT     Specific  Provider Orders/Date/Referring Provider :   07/02/21 2000   PT evaluation and treat Start: 07/02/21 2000, End: 07/02/21 2000, ONE TIME, Standing Count: 1 Occurrences, R    Jignesh Edwards MD Acknowledge New    Admitting Diagnosis:   Acute respiratory failure with hypoxia ([facility] [J96.01]     Visit Diagnoses       Codes    Pneumonia of right lung due to infectious organism, unspecified part of lung     J18.9        Surgery: None    Patient Active Problem List   Diagnosis    Chronic pain of right knee    Osteoarthritis of spine with radiculopathy, cervical region    Cervical disc disorder    Cervical radiculopathy    Chronic pain syndrome    Acute pain of right knee    Acute respiratory failure with hypoxia ([facility] [address]    CAP (community acquired pneumonia)    COPD exacerbation ([address]    Depression       ASSESSMENT of Current Deficits Patient exhibits decreased strength, balance and endurance impairing functional mobility, transfers, gait , gait distance and tolerance to activity        PHYSICAL THERAPY  PLAN OF CARE       Physical therapy plan of care is established based on physician order,  patient diagnosis and clinical assessment    Current Treatment Recommendations:    -Bed Mobility: Lower extremity exercises  and Trunk control activities   -Sitting Balance: Incorporate reaching activities to activate trunk muscles , Hands on support to maintain midline , Facilitate active trunk muscle engagement , Facilitate postural control in all planes  and Engage in core activities to allow for movement within base of support -Standing Balance: Perform strengthening exercises in standing to promote motor control with or without upper extremity support , Instruct patient on adequate base of support to maintain balance and Challenge balance utilizing reaching  activities beyond center of gravity    -Transfers: Provide instruction on proper hand and foot position for adequate transfer of weight onto lower extremities and use of gait device, Cues for hand placement, technique and safety, Facilitate weight shift forward on to lower extremities and provide necessary stabilization of bilateral lower extremities , Support transfer of weight on to lower extremities, Assist with extension of knees trunk and hip to accept weight transfer  and Provide stabilization to prevent fall   -Gait: Gait training, Standing activities to improve: base of support, weight shift, weight bearing , Exercises to improve trunk control, Exercises to improve hip and knee control, Performance of protected weight bearing activities and Activities to increase weight bearing   -Endurance: Utilize Supervised activities to increase level of endurance to allow for safe functional mobility including transfers and gait  and Use graduated activities to promote good breathing techniques and provide support and education to maximize respiratory function    PT long term treatment goals are located in below grid    Patient and or family understand(s) diagnosis, prognosis, and plan of care. Frequency of treatments: Patient will be seen  3-5 times/week. Prior Level of Function: Patient ambulated independently   Rehab Potential: good  for baseline    Past medical history:   Past Medical History:   Diagnosis Date    Alcoholism ([facility] [address]     H/O    [facility]     Dental caries     Hypertension     no meds    Lung nodules      [place]     Schizophrenia ([facility]     stable    Ventricular hypokinesis     H/O     Past Surgical History:   Procedure Laterality Date    APPENDECTOMY       SECTION      2 C SECTION    ECHO COMPLETE  1/10/2014         [place] DENTAL SURGERY PROCEDURE N/A 2018    MULTIPLE DENTAL EXTRACTIONS AND ALVELOPLASTY performed by Milton Yu DDS at [address] [place] [address] Right     WRIST SURGERY Right        SUBJECTIVE:    Precautions: Up with assistance, falls and ETOH abuse, Schizophrenia   Social history: Patient lives alone  in a apartment    with No steps  to enter. Equipment owned: None,      [address]       AM-PAC Mobility Inpatient   How much difficulty turning over in bed?: A Little  How much difficulty sitting down on / standing up from a chair with arms?: A Lot  How much difficulty moving from lying on back to sitting on side of bed?: A Little  How much help from another person moving to and from a bed to a chair?: A Lot  How much help from another person needed to walk in hospital room?: A Lot  How much help from another person for climbing 3-5 steps with a railing?: Total  AM-PAC Inpatient Mobility Raw Score : 13  AM-PAC Inpatient T-Scale Score : 36.74  Mobility Inpatient CMS 0-100% Score: 64.91  Mobility Inpatient CMS G-Code Modifier : CL    Nursing cleared patient for PT evaluation. The admitting diagnosis and active problem list as listed above have been reviewed prior to the initiation of this evaluation. OBJECTIVE;   Initial Evaluation  Date: 7/3/2021 Treatment Date:     Short Term/ Long Term   Goals   Was pt agreeable to Eval/treatment? Yes   To be met in 3 days   Pain level   5/10  Headache     Bed Mobility    Rolling: Supervision    Supine to sit: Supervision    Sit to supine: Supervision    Scooting: Supervision    Rolling: Independent   Supine to sit: Independent   Sit to supine: Independent   Scooting: Independent    Transfers Sit to stand:  Moderate assist of 1   Sit to stand: Independent    Ambulation    45 feet using  hand held assist with Moderate assist of 1      > 200 feet using  least restrictive device versus no device with Independent    Stair negotiation: ascended and descended   Not assessed        ROM Within functional limits   Increase range of motion 10% of affected joints    Strength BUE:  refer to OT eval  RLE:  4-/5  LLE:  4-/5  Increase strength in affected mm groups by 1/3 grade   Balance Sitting EOB:  fair-  Dynamic Standing:  fair-  Sitting EOB:  fair +  Dynamic Standing: fair +     Patient is Alert & Oriented x person and situation and follows one step directions   Sensation:  Patient  denies numbness/tingling  Edema:  no   Endurance: fair     Vitals: 2L of O2 liters nasal cannula   Blood Pressure at rest  Blood Pressure during session    Heart Rate at rest  Heart Rate during session    SPO2 at rest 100% with 2L  SPO2 during session 86% on RA, required 2L of O2 and 2-3 minutes to return to mid 90s     Patient education  Patient educated on role of Physical Therapy, risks of immobility, safety and plan of care, energy conservation,  importance of mobility while in hospital , purse lip breathing, safety , O2 line management and safety  and positioning for skin integrity and comfort     Patient response to education:   Pt verbalized understanding Pt demonstrated skill Pt requires further education in this area   Yes Partial Yes      Treatment:  Patient practiced and was instructed/facilitated in the following treatment: Patient  Sat edge of bed 10 minutes with Minimal assist of 1 to increase dynamic sitting balance and activity tolerance. Pt required arousal multiple times at the beginning of the session in order to open her eyes and participate in therapy. Once sitting EOB, pt was more coherent but still very unsteady. Pt demonstrated ataxic gait with a decreased tolerance for function. Pt O2 Sat dropped from 100% of 2L of O2 at rest to 86% on RA with 2-3 minutes required to bring O2 back into the mid 90s.       Therapeutic Exercises:

## 2021-07-03 NOTE — PLAN OF CARE
Problem: Falls - Risk of:  Goal: Will remain free from falls  Description: Will remain free from falls  Outcome: Met This Shift     Problem: Falls - Risk of:  Goal: Absence of physical injury  Description: Absence of physical injury  7/3/2021 0038 by Sarah Santos RN  Outcome: Met This Shift  7/3/2021 0036 by Sarah Santos RN  Outcome: Met This Shift     Problem: Airway Clearance - Ineffective:  Goal: Ability to maintain a clear airway will improve  Description: Ability to maintain a clear airway will improve  Outcome: Met This Shift     Problem: Activity Intolerance:  Goal: Ability to perform activities of daily living will improve  Description: Ability to perform activities of daily living will improve  Outcome: Ongoing     Problem:  Activity Intolerance:  Goal: Ability to perform activities of daily living will improve  Description: Ability to perform activities of daily living will improve  Outcome: Ongoing

## 2021-07-03 NOTE — PROGRESS NOTES
Department of Internal Medicine  General Internal Medicine  Attending Progress Note  Chief Complaint   Patient presents with    Respiratory Distress     100% on c-pap, SOB x 2 days. 125 solu med given en route.  Chest Pain     cp started last night, still c/o of this today. SUBJECTIVE:    Very sleepy and difficulty to wake up, but able to do wake her up. She answered all my questions. She denied fever and chills. No chest pain currently. She is notably drooling, but still able to maintain airway.      OBJECTIVE      Medications    Current Facility-Administered Medications: benztropine (COGENTIN) tablet 1 mg, 1 mg, Oral, BID  busPIRone (BUSPAR) tablet 10 mg, 10 mg, Oral, BID  clonazePAM (KLONOPIN) tablet 1 mg, 1 mg, Oral, BID  FLUoxetine (PROZAC) capsule 20 mg, 20 mg, Oral, Daily  gabapentin (NEURONTIN) capsule 1,200 mg, 1,200 mg, Oral, Daily  ibuprofen (ADVIL;MOTRIN) tablet 400 mg, 400 mg, Oral, Q6H PRN  lurasidone (LATUDA) tablet 40 mg, 40 mg, Oral, Daily  sodium chloride flush 0.9 % injection 5-40 mL, 5-40 mL, Intravenous, 2 times per day  sodium chloride flush 0.9 % injection 5-40 mL, 5-40 mL, Intravenous, PRN  0.9 % sodium chloride infusion, 25 mL, Intravenous, PRN  ondansetron (ZOFRAN-ODT) disintegrating tablet 4 mg, 4 mg, Oral, Q8H PRN **OR** ondansetron (ZOFRAN) injection 4 mg, 4 mg, Intravenous, Q6H PRN  polyethylene glycol (GLYCOLAX) packet 17 g, 17 g, Oral, Daily PRN  enoxaparin (LOVENOX) injection 40 mg, 40 mg, Subcutaneous, Daily  acetaminophen (TYLENOL) tablet 650 mg, 650 mg, Oral, Q6H PRN **OR** acetaminophen (TYLENOL) suppository 650 mg, 650 mg, Rectal, Q6H PRN  ipratropium-albuterol (DUONEB) nebulizer solution 1 ampule, 1 ampule, Inhalation, Q4H WA  methylPREDNISolone sodium (SOLU-MEDROL) injection 40 mg, 40 mg, Intravenous, Q6H **FOLLOWED BY** [START ON 7/5/2021] predniSONE (DELTASONE) tablet 40 mg, 40 mg, Oral, Daily  cefTRIAXone (ROCEPHIN) 1,000 mg in sterile water 10 mL IV syringe, 1,000 mg, Intravenous, Q24H  doxycycline hyclate (VIBRAMYCIN) capsule 100 mg, 100 mg, Oral, Q12H  nicotine (NICODERM CQ) 21 MG/24HR 1 patch, 1 patch, Transdermal, Daily  budesonide (PULMICORT) nebulizer suspension 250 mcg, 250 mcg, Nebulization, BID  Physical    VITALS:  /64   Pulse 84   Temp 98.2 °F (36.8 °C) (Oral)   Resp 16   Ht 5' 8\" (1.727 m)   Wt 122 lb 11.2 oz (55.7 kg)   LMP  (LMP Unknown)   SpO2 93%   BMI 18.66 kg/m²   CONSTITUTIONAL:  Lethargic, but wakes up when prompted  EYES:  Lids and lashes normal, pupils equal, round and reactive to light, extra ocular muscles intact, sclera clear, conjunctiva normal  ENT:  normocepalic, without obvious abnormality  NECK:  Supple, symmetrical, trachea midline, no adenopathy, thyroid symmetric, not enlarged and no tenderness, skin normal  BACK:  Symmetric, no curvature, spinous processes are non-tender on palpation, paraspinous muscles are non-tender on palpation, no costal vertebral tenderness  LUNGS:  No increased work of breathing, good air exchange, clear to auscultation bilaterally, no crackles or wheezing  CARDIOVASCULAR:  Normal apical impulse, regular rate and rhythm, normal S1 and S2, no S3 or S4, and no murmur noted  ABDOMEN:  No scars, normal bowel sounds, soft, non-distended, non-tender, no masses palpated, no hepatosplenomegally  MUSCULOSKELETAL:  there is no redness, warmth, or swelling of the joints  NEUROLOGIC:  No focal neuro deficit  SKIN:  no bruising or bleeding  Data    CBC:   Lab Results   Component Value Date    WBC 13.5 07/03/2021    RBC 4.13 07/03/2021    HGB 13.2 07/03/2021    HCT 40.0 07/03/2021    MCV 96.9 07/03/2021    MCH 32.0 07/03/2021    MCHC 33.0 07/03/2021    RDW 14.1 07/03/2021     07/03/2021    MPV 11.2 07/03/2021     BMP:    Lab Results   Component Value Date     07/03/2021    K 4.0 07/03/2021     07/03/2021    CO2 24 07/03/2021    BUN 22 07/03/2021    LABALBU 3.5 07/03/2021    CREATININE 0.9 07/03/2021 CALCIUM 8.9 07/03/2021    GFRAA >60 07/03/2021    LABGLOM >60 07/03/2021    GLUCOSE 156 07/03/2021       ASSESSMENT AND PLAN      1. Chronic pain syndrome    2. Acute respiratory failure with hypoxia (HCC)    3. CAP (community acquired pneumonia)    4. COPD exacerbation ([facility] [address]    5. Depression    6. Lethargy:     7. Malnutrition: Cachectic in appearance. This may be due to End stage COPD. Plans:  Continue current antibiotics and breathing treatment  Ordered continuous pulse oxy  Continue supplemental oxygen and wean off as tolerated  Pulmo hygiene  Continue to monitor  Suspects that patient's Benzo and other psych medication may be contributing.

## 2021-07-04 PROCEDURE — 2580000003 HC RX 258: Performed by: INTERNAL MEDICINE

## 2021-07-04 PROCEDURE — 99232 SBSQ HOSP IP/OBS MODERATE 35: CPT | Performed by: INTERNAL MEDICINE

## 2021-07-04 PROCEDURE — 6360000002 HC RX W HCPCS: Performed by: INTERNAL MEDICINE

## 2021-07-04 PROCEDURE — 2060000000 HC ICU INTERMEDIATE R&B

## 2021-07-04 PROCEDURE — 94640 AIRWAY INHALATION TREATMENT: CPT

## 2021-07-04 PROCEDURE — 6370000000 HC RX 637 (ALT 250 FOR IP): Performed by: INTERNAL MEDICINE

## 2021-07-04 PROCEDURE — 2700000000 HC OXYGEN THERAPY PER DAY

## 2021-07-04 RX ADMIN — DOXYCYCLINE HYCLATE 100 MG: 100 CAPSULE ORAL at 09:05

## 2021-07-04 RX ADMIN — METHYLPREDNISOLONE SODIUM SUCCINATE 40 MG: 40 INJECTION, POWDER, FOR SOLUTION INTRAMUSCULAR; INTRAVENOUS at 15:40

## 2021-07-04 RX ADMIN — BUSPIRONE HYDROCHLORIDE 10 MG: 5 TABLET ORAL at 21:10

## 2021-07-04 RX ADMIN — CLONAZEPAM 1 MG: 1 TABLET ORAL at 09:05

## 2021-07-04 RX ADMIN — LURASIDONE HYDROCHLORIDE 40 MG: 40 TABLET, FILM COATED ORAL at 09:04

## 2021-07-04 RX ADMIN — GABAPENTIN 1200 MG: 400 CAPSULE ORAL at 09:04

## 2021-07-04 RX ADMIN — IPRATROPIUM BROMIDE AND ALBUTEROL SULFATE 1 AMPULE: .5; 3 SOLUTION RESPIRATORY (INHALATION) at 14:00

## 2021-07-04 RX ADMIN — FLUOXETINE 20 MG: 20 CAPSULE ORAL at 09:05

## 2021-07-04 RX ADMIN — IPRATROPIUM BROMIDE AND ALBUTEROL SULFATE 1 AMPULE: .5; 3 SOLUTION RESPIRATORY (INHALATION) at 10:50

## 2021-07-04 RX ADMIN — ENOXAPARIN SODIUM 40 MG: 40 INJECTION SUBCUTANEOUS at 09:04

## 2021-07-04 RX ADMIN — BENZTROPINE MESYLATE 1 MG: 1 TABLET ORAL at 09:05

## 2021-07-04 RX ADMIN — SODIUM CHLORIDE, PRESERVATIVE FREE 10 ML: 5 INJECTION INTRAVENOUS at 21:11

## 2021-07-04 RX ADMIN — IBUPROFEN 400 MG: 400 TABLET, FILM COATED ORAL at 09:04

## 2021-07-04 RX ADMIN — METHYLPREDNISOLONE SODIUM SUCCINATE 40 MG: 40 INJECTION, POWDER, FOR SOLUTION INTRAMUSCULAR; INTRAVENOUS at 03:07

## 2021-07-04 RX ADMIN — BUSPIRONE HYDROCHLORIDE 10 MG: 5 TABLET ORAL at 09:05

## 2021-07-04 RX ADMIN — DOXYCYCLINE HYCLATE 100 MG: 100 CAPSULE ORAL at 20:32

## 2021-07-04 RX ADMIN — CLONAZEPAM 1 MG: 1 TABLET ORAL at 21:10

## 2021-07-04 RX ADMIN — CEFTRIAXONE SODIUM 1000 MG: 1 INJECTION, POWDER, FOR SOLUTION INTRAMUSCULAR; INTRAVENOUS at 09:04

## 2021-07-04 RX ADMIN — SODIUM CHLORIDE, PRESERVATIVE FREE 10 ML: 5 INJECTION INTRAVENOUS at 09:05

## 2021-07-04 RX ADMIN — BENZTROPINE MESYLATE 1 MG: 1 TABLET ORAL at 21:10

## 2021-07-04 RX ADMIN — METHYLPREDNISOLONE SODIUM SUCCINATE 40 MG: 40 INJECTION, POWDER, FOR SOLUTION INTRAMUSCULAR; INTRAVENOUS at 09:04

## 2021-07-04 ASSESSMENT — PAIN SCALES - GENERAL
PAINLEVEL_OUTOF10: 0
PAINLEVEL_OUTOF10: 0
PAINLEVEL_OUTOF10: 3
PAINLEVEL_OUTOF10: 0
PAINLEVEL_OUTOF10: 4

## 2021-07-04 ASSESSMENT — PAIN - FUNCTIONAL ASSESSMENT: PAIN_FUNCTIONAL_ASSESSMENT: ACTIVITIES ARE NOT PREVENTED

## 2021-07-04 ASSESSMENT — PAIN DESCRIPTION - DESCRIPTORS: DESCRIPTORS: ACHING

## 2021-07-04 ASSESSMENT — PAIN DESCRIPTION - PAIN TYPE: TYPE: ACUTE PAIN

## 2021-07-04 ASSESSMENT — PAIN DESCRIPTION - ONSET: ONSET: GRADUAL

## 2021-07-04 ASSESSMENT — PAIN DESCRIPTION - ORIENTATION: ORIENTATION: ANTERIOR

## 2021-07-04 ASSESSMENT — PAIN DESCRIPTION - LOCATION: LOCATION: HEAD

## 2021-07-04 ASSESSMENT — PAIN DESCRIPTION - FREQUENCY: FREQUENCY: INTERMITTENT

## 2021-07-04 ASSESSMENT — PAIN DESCRIPTION - PROGRESSION
CLINICAL_PROGRESSION: GRADUALLY WORSENING
CLINICAL_PROGRESSION: GRADUALLY WORSENING

## 2021-07-04 NOTE — PLAN OF CARE
Problem: Falls - Risk of:  Goal: Will remain free from falls  Description: Will remain free from falls  Outcome: Met This Shift     Problem: Falls - Risk of:  Goal: Absence of physical injury  Description: Absence of physical injury  Outcome: Met This Shift     Problem:  Activity Intolerance:  Goal: Ability to perform activities of daily living will improve  Description: Ability to perform activities of daily living will improve  Outcome: Met This Shift     Problem: Airway Clearance - Ineffective:  Goal: Ability to maintain a clear airway will improve  Description: Ability to maintain a clear airway will improve  Outcome: Met This Shift

## 2021-07-04 NOTE — PROGRESS NOTES
Department of Internal Medicine  General Internal Medicine  Attending Progress Note  Chief Complaint   Patient presents with    Respiratory Distress     100% on c-pap, SOB x 2 days. 125 solu med given en route.  Chest Pain     cp started last night, still c/o of this today. SUBJECTIVE:    Patient reports that she is doing better this morning. No fever no chills. Noted that she is still having some cough. But this has much improved and cleared up. She denied fever chills no nausea no vomiting no chest pain. She is aware of plan to continue current treatment and possible discharge her home with breathing treatment. She noted that she has some breathing treatment at home but it does not seem to work. She is a longtime smoker and has smoked for more than 50 years. She noted that she is attempting to quit smoking.     OBJECTIVE      Medications    Current Facility-Administered Medications: benztropine (COGENTIN) tablet 1 mg, 1 mg, Oral, BID  busPIRone (BUSPAR) tablet 10 mg, 10 mg, Oral, BID  clonazePAM (KLONOPIN) tablet 1 mg, 1 mg, Oral, BID  FLUoxetine (PROZAC) capsule 20 mg, 20 mg, Oral, Daily  gabapentin (NEURONTIN) capsule 1,200 mg, 1,200 mg, Oral, Daily  ibuprofen (ADVIL;MOTRIN) tablet 400 mg, 400 mg, Oral, Q6H PRN  lurasidone (LATUDA) tablet 40 mg, 40 mg, Oral, Daily  sodium chloride flush 0.9 % injection 5-40 mL, 5-40 mL, Intravenous, 2 times per day  sodium chloride flush 0.9 % injection 5-40 mL, 5-40 mL, Intravenous, PRN  0.9 % sodium chloride infusion, 25 mL, Intravenous, PRN  ondansetron (ZOFRAN-ODT) disintegrating tablet 4 mg, 4 mg, Oral, Q8H PRN **OR** ondansetron (ZOFRAN) injection 4 mg, 4 mg, Intravenous, Q6H PRN  polyethylene glycol (GLYCOLAX) packet 17 g, 17 g, Oral, Daily PRN  enoxaparin (LOVENOX) injection 40 mg, 40 mg, Subcutaneous, Daily  acetaminophen (TYLENOL) tablet 650 mg, 650 mg, Oral, Q6H PRN **OR** acetaminophen (TYLENOL) suppository 650 mg, 650 mg, Rectal, Q6H PRN  ipratropium-albuterol (DUONEB) nebulizer solution 1 ampule, 1 ampule, Inhalation, Q4H WA  methylPREDNISolone sodium (SOLU-MEDROL) injection 40 mg, 40 mg, Intravenous, Q6H **FOLLOWED BY** [START ON 7/5/2021] predniSONE (DELTASONE) tablet 40 mg, 40 mg, Oral, Daily  cefTRIAXone (ROCEPHIN) 1,000 mg in sterile water 10 mL IV syringe, 1,000 mg, Intravenous, Q24H  doxycycline hyclate (VIBRAMYCIN) capsule 100 mg, 100 mg, Oral, Q12H  nicotine (NICODERM CQ) 21 MG/24HR 1 patch, 1 patch, Transdermal, Daily  budesonide (PULMICORT) nebulizer suspension 250 mcg, 250 mcg, Nebulization, BID  Physical    VITALS:  /61   Pulse 92   Temp 97.8 °F (36.6 °C) (Oral)   Resp 18   Ht 5' 8\" (1.727 m)   Wt 131 lb (59.4 kg) Comment: Weighed twice   LMP  (LMP Unknown)   SpO2 95%   BMI 19.92 kg/m²   CONSTITUTIONAL:  awake, alert, cooperative, no apparent distress, and appears stated age  EYES:  Lids and lashes normal, pupils equal, round and reactive to light, extra ocular muscles intact, sclera clear, conjunctiva normal  ENT:  normocepalic, without obvious abnormality  BACK:  Symmetric, no curvature, spinous processes are non-tender on palpation, paraspinous muscles are non-tender on palpation, no costal vertebral tenderness  LUNGS: Diminished air entry otherwise no wheezing no crackles  CARDIOVASCULAR:  Normal apical impulse, regular rate and rhythm, normal S1 and S2, no S3 or S4, and no murmur noted  ABDOMEN:  No scars, normal bowel sounds, soft, non-distended, non-tender, no masses palpated, no hepatosplenomegally  MUSCULOSKELETAL:  there is no redness, warmth, or swelling of the joints  NEUROLOGIC: No focal neuro deficits  SKIN:  no bruising or bleeding  Data    CBC:   Lab Results   Component Value Date    WBC 13.5 07/03/2021    RBC 4.13 07/03/2021    HGB 13.2 07/03/2021    HCT 40.0 07/03/2021    MCV 96.9 07/03/2021    MCH 32.0 07/03/2021    MCHC 33.0 07/03/2021    RDW 14.1 07/03/2021     07/03/2021    MPV 11.2 07/03/2021     BMP:    Lab Results   Component Value Date     07/03/2021    K 4.0 07/03/2021     07/03/2021    CO2 24 07/03/2021    BUN 22 07/03/2021    LABALBU 3.5 07/03/2021    CREATININE 0.9 07/03/2021    CALCIUM 8.9 07/03/2021    GFRAA >60 07/03/2021    LABGLOM >60 07/03/2021    GLUCOSE 156 07/03/2021       ASSESSMENT AND PLAN    Brief summary of stay in:  Patient was admitted with shortness of breath. She was noted to be in COPD exacerbation and was noted to have pneumonia. She was started on antibiotics. And breathing treatment. She was doing fine. She has episode of being lethargic on 7/3. Work-up was unremarkable but she was found to have cocaine in her system she did admit to using there is the day prior to being admitted in the hospital.    1.  Chronic pain syndrome    2. Acute respiratory failure with hypoxia (HCC)    3. CAP (community acquired pneumonia)    4. COPD exacerbation ([address]    5. Depression    6. Cocaine abuse:    Plans:  Continue current antibiotics. Pain control as needed. Continue current antibiotics. Check BMP and CBC in the morning. If patient continues to improve, possible discharge home breathing treatments tomorrow.

## 2021-07-04 NOTE — PLAN OF CARE
Problem: Falls - Risk of:  Goal: Will remain free from falls  Description: Will remain free from falls  Outcome: Met This Shift  Goal: Absence of physical injury  Description: Absence of physical injury  Outcome: Met This Shift     Problem:  Activity Intolerance:  Goal: Ability to perform activities of daily living will improve  Description: Ability to perform activities of daily living will improve  Outcome: Met This Shift     Problem: Airway Clearance - Ineffective:  Goal: Ability to maintain a clear airway will improve  Description: Ability to maintain a clear airway will improve  Outcome: Ongoing

## 2021-07-05 VITALS
HEIGHT: 68 IN | RESPIRATION RATE: 16 BRPM | HEART RATE: 87 BPM | OXYGEN SATURATION: 98 % | DIASTOLIC BLOOD PRESSURE: 61 MMHG | SYSTOLIC BLOOD PRESSURE: 126 MMHG | BODY MASS INDEX: 19.85 KG/M2 | WEIGHT: 131 LBS | TEMPERATURE: 97.8 F

## 2021-07-05 LAB
ALBUMIN SERPL-MCNC: 3.5 G/DL (ref 3.5–5.2)
ALP BLD-CCNC: 102 U/L (ref 35–104)
ALT SERPL-CCNC: 17 U/L (ref 0–32)
ANION GAP SERPL CALCULATED.3IONS-SCNC: 7 MMOL/L (ref 7–16)
AST SERPL-CCNC: 24 U/L (ref 0–31)
BASOPHILS ABSOLUTE: 0.01 E9/L (ref 0–0.2)
BASOPHILS RELATIVE PERCENT: 0.1 % (ref 0–2)
BILIRUB SERPL-MCNC: 0.2 MG/DL (ref 0–1.2)
BUN BLDV-MCNC: 28 MG/DL (ref 6–20)
CALCIUM SERPL-MCNC: 9.3 MG/DL (ref 8.6–10.2)
CHLORIDE BLD-SCNC: 103 MMOL/L (ref 98–107)
CO2: 29 MMOL/L (ref 22–29)
CREAT SERPL-MCNC: 1 MG/DL (ref 0.5–1)
EOSINOPHILS ABSOLUTE: 0 E9/L (ref 0.05–0.5)
EOSINOPHILS RELATIVE PERCENT: 0 % (ref 0–6)
GFR AFRICAN AMERICAN: >60
GFR NON-AFRICAN AMERICAN: 57 ML/MIN/1.73
GLUCOSE BLD-MCNC: 79 MG/DL (ref 74–99)
HCT VFR BLD CALC: 37.2 % (ref 34–48)
HEMOGLOBIN: 11.7 G/DL (ref 11.5–15.5)
IMMATURE GRANULOCYTES #: 0.08 E9/L
IMMATURE GRANULOCYTES %: 0.6 % (ref 0–5)
LYMPHOCYTES ABSOLUTE: 0.74 E9/L (ref 1.5–4)
LYMPHOCYTES RELATIVE PERCENT: 5.2 % (ref 20–42)
MCH RBC QN AUTO: 31.9 PG (ref 26–35)
MCHC RBC AUTO-ENTMCNC: 31.5 % (ref 32–34.5)
MCV RBC AUTO: 101.4 FL (ref 80–99.9)
MONOCYTES ABSOLUTE: 0.85 E9/L (ref 0.1–0.95)
MONOCYTES RELATIVE PERCENT: 6 % (ref 2–12)
NEUTROPHILS ABSOLUTE: 12.44 E9/L (ref 1.8–7.3)
NEUTROPHILS RELATIVE PERCENT: 88.1 % (ref 43–80)
PDW BLD-RTO: 14.4 FL (ref 11.5–15)
PLATELET # BLD: 285 E9/L (ref 130–450)
PMV BLD AUTO: 10.9 FL (ref 7–12)
POTASSIUM SERPL-SCNC: 4.3 MMOL/L (ref 3.5–5)
RBC # BLD: 3.67 E12/L (ref 3.5–5.5)
SODIUM BLD-SCNC: 139 MMOL/L (ref 132–146)
TOTAL PROTEIN: 6.4 G/DL (ref 6.4–8.3)
WBC # BLD: 14.1 E9/L (ref 4.5–11.5)

## 2021-07-05 PROCEDURE — 97165 OT EVAL LOW COMPLEX 30 MIN: CPT

## 2021-07-05 PROCEDURE — 36415 COLL VENOUS BLD VENIPUNCTURE: CPT

## 2021-07-05 PROCEDURE — 97535 SELF CARE MNGMENT TRAINING: CPT

## 2021-07-05 PROCEDURE — 97530 THERAPEUTIC ACTIVITIES: CPT

## 2021-07-05 PROCEDURE — 6370000000 HC RX 637 (ALT 250 FOR IP): Performed by: INTERNAL MEDICINE

## 2021-07-05 PROCEDURE — 2700000000 HC OXYGEN THERAPY PER DAY

## 2021-07-05 PROCEDURE — 6360000002 HC RX W HCPCS: Performed by: INTERNAL MEDICINE

## 2021-07-05 PROCEDURE — 85025 COMPLETE CBC W/AUTO DIFF WBC: CPT

## 2021-07-05 PROCEDURE — 2580000003 HC RX 258: Performed by: INTERNAL MEDICINE

## 2021-07-05 PROCEDURE — 80053 COMPREHEN METABOLIC PANEL: CPT

## 2021-07-05 PROCEDURE — 94640 AIRWAY INHALATION TREATMENT: CPT

## 2021-07-05 PROCEDURE — 94761 N-INVAS EAR/PLS OXIMETRY MLT: CPT

## 2021-07-05 PROCEDURE — 99239 HOSP IP/OBS DSCHRG MGMT >30: CPT | Performed by: INTERNAL MEDICINE

## 2021-07-05 RX ORDER — ALBUTEROL SULFATE 90 UG/1
2 AEROSOL, METERED RESPIRATORY (INHALATION) EVERY 6 HOURS PRN
Qty: 1 INHALER | Refills: 3 | Status: SHIPPED | OUTPATIENT
Start: 2021-07-05 | End: 2021-11-01 | Stop reason: ALTCHOICE

## 2021-07-05 RX ORDER — PREDNISONE 20 MG/1
40 TABLET ORAL DAILY
Qty: 8 TABLET | Refills: 0 | Status: SHIPPED | OUTPATIENT
Start: 2021-07-06 | End: 2021-07-10

## 2021-07-05 RX ORDER — AMOXICILLIN AND CLAVULANATE POTASSIUM 875; 125 MG/1; MG/1
1 TABLET, FILM COATED ORAL 2 TIMES DAILY
Qty: 14 TABLET | Refills: 0 | Status: SHIPPED | OUTPATIENT
Start: 2021-07-05 | End: 2021-07-12

## 2021-07-05 RX ADMIN — BENZTROPINE MESYLATE 1 MG: 1 TABLET ORAL at 08:26

## 2021-07-05 RX ADMIN — PREDNISONE 40 MG: 20 TABLET ORAL at 08:25

## 2021-07-05 RX ADMIN — CEFTRIAXONE SODIUM 1000 MG: 1 INJECTION, POWDER, FOR SOLUTION INTRAMUSCULAR; INTRAVENOUS at 08:26

## 2021-07-05 RX ADMIN — DOXYCYCLINE HYCLATE 100 MG: 100 CAPSULE ORAL at 08:26

## 2021-07-05 RX ADMIN — SODIUM CHLORIDE, PRESERVATIVE FREE 10 ML: 5 INJECTION INTRAVENOUS at 08:27

## 2021-07-05 RX ADMIN — FLUOXETINE 20 MG: 20 CAPSULE ORAL at 08:26

## 2021-07-05 RX ADMIN — IPRATROPIUM BROMIDE AND ALBUTEROL SULFATE 1 AMPULE: .5; 3 SOLUTION RESPIRATORY (INHALATION) at 10:46

## 2021-07-05 RX ADMIN — BUDESONIDE 250 MCG: 0.25 INHALANT RESPIRATORY (INHALATION) at 06:48

## 2021-07-05 RX ADMIN — BUSPIRONE HYDROCHLORIDE 10 MG: 5 TABLET ORAL at 08:25

## 2021-07-05 RX ADMIN — ENOXAPARIN SODIUM 40 MG: 40 INJECTION SUBCUTANEOUS at 08:24

## 2021-07-05 RX ADMIN — GABAPENTIN 1200 MG: 400 CAPSULE ORAL at 08:25

## 2021-07-05 RX ADMIN — CLONAZEPAM 1 MG: 1 TABLET ORAL at 08:26

## 2021-07-05 RX ADMIN — LURASIDONE HYDROCHLORIDE 40 MG: 40 TABLET, FILM COATED ORAL at 08:26

## 2021-07-05 RX ADMIN — IPRATROPIUM BROMIDE AND ALBUTEROL SULFATE 1 AMPULE: .5; 3 SOLUTION RESPIRATORY (INHALATION) at 06:48

## 2021-07-05 ASSESSMENT — PAIN DESCRIPTION - PAIN TYPE: TYPE: CHRONIC PAIN

## 2021-07-05 ASSESSMENT — PAIN SCALES - GENERAL
PAINLEVEL_OUTOF10: 0
PAINLEVEL_OUTOF10: 6

## 2021-07-05 ASSESSMENT — PAIN DESCRIPTION - LOCATION: LOCATION: GENERALIZED

## 2021-07-05 NOTE — DISCHARGE SUMMARY
[facility] [place] Physician Discharge Summary             See your PCP within 1 week of discharge      Activity level: Slowly increase as tolerated    Diet: ADULT DIET; Regular    Labs: None are pending at the discharge    Condition at discharge: Stable    Dispo: Return to home setting     Patient ID:  Gauri Broderick  [id number]  63 y.o.  1960    Admit date: 7/2/2021    Discharge date and time:  7/5/2021  12:38 PM    Admission Diagnoses: Principal Problem:    CAP (community acquired pneumonia)  Active Problems:    Chronic pain syndrome    Acute respiratory failure with hypoxia (HCC)    COPD exacerbation ([facility] [address]    Depression  Resolved Problems:    * No resolved hospital problems. *      Discharge Diagnoses: Principal Problem:    CAP (community acquired pneumonia)  Active Problems:    Chronic pain syndrome    Acute respiratory failure with hypoxia (HCC)    COPD exacerbation (HCC)    Depression  Resolved Problems:    * No resolved hospital problems. *      Consults:  PULMONARY REHAB EVALUATION  IP CONSULT TO SOCIAL WORK    Procedures: None significant except if described in hospital course. Hospital Course:     Patient was admitted for acute exacerbation of COPD due to pneumonia given Solu-Medrol oxygen albuterol nebulizers and antibiotics. She was lethargic on 7/3 but unremarkable work-up. Urine drug screen showed cocaine. She has dual diagnosis of psychiatric condition and history of drug abuse.  met with her to make sure she is plugged in with the community and she has some follow-up issues in regards to this. Her Solu-Medrol was switched to prednisone on 7/5. By then her O2 had already been weaned off since yesterday. She is safe for discharge today. She is a smoker and is already planning on quitting. I have stopped her NSAIDs while on prednisone. She will have 4 more days of prednisone.   I also prescribed albuterol inhaler and Augmentin for 1 week. she needs close follow-up for her chronic pain and dual diagnosis      Discharge Exam:  Vitals:    07/04/21 1545 07/04/21 2028 07/05/21 0430 07/05/21 0815   BP: 123/66 119/69 128/72 126/61   Pulse: 90 112 80 87   Resp: 17 18 16 16   Temp: 97.9 °F (36.6 °C) 98 °F (36.7 °C) 97.5 °F (36.4 °C) 97.8 °F (36.6 °C)   TempSrc: Oral Oral Oral Oral   SpO2: 97% 97%  98%   Weight:       Height:           No acute distress moist membranes   Normocephalic, without obvious abnormality, atraumatic, external ears without lesions,   Neck supple no cervical lymphadenopathy  Heart has a regular rate and rhythm no murmur  Lungs are clear to auscultation bilaterally with equal movements. Abdomen is soft nontender nondistended no rebound or guarding. No  significant peripheral edema good peripheral perfusion. No significant rashes or new skin lesions. No new focality on neuro exam which is overall grossly intact. Affect and mood seem to be appropriate     I/O last 3 completed shifts: In: 1330 [P.O.:1320; I.V.:10]  Out: -   No intake/output data recorded. LABS:  Recent Labs     07/02/21  1347 07/03/21  0506 07/05/21  0438    134 139   K 4.2 4.0 4.3    101 103   CO2 21* 24 29   BUN 14 22* 28*   CREATININE 1.1* 0.9 1.0   GLUCOSE 88 156* 79   CALCIUM 9.2 8.9 9.3       Recent Labs     07/02/21  1347 07/03/21  0506 07/05/21  0438   WBC 19.3* 13.5* 14.1*   RBC 4.39 4.13 3.67   HGB 14.3 13.2 11.7   HCT 43.2 40.0 37.2   MCV 98.4 96.9 101.4*   MCH 32.6 32.0 31.9   MCHC 33.1 33.0 31.5*   RDW 14.1 14.1 14.4    277 285   MPV 10.8 11.2 10.9       No results for input(s): POCGLU in the last 72 hours. Imaging:  CTA PULMONARY W CONTRAST    Result Date: 7/2/2021  EXAMINATION: CTA OF THE CHEST 7/2/2021 2:02 pm TECHNIQUE: CTA of the chest was performed after the administration of intravenous contrast.  Multiplanar reformatted images are provided for review. MIP images are provided for review.  Dose modulation, iterative reconstruction, and/or weight based adjustment of the mA/kV was utilized to reduce the radiation dose to as low as reasonably achievable. COMPARISON: None. HISTORY: ORDERING SYSTEM PROVIDED HISTORY: Cp, PETER, hypoxia TECHNOLOGIST PROVIDED HISTORY: Reason for exam:->Cp, PETER, hypoxia Decision Support Exception - unselect if not a suspected or confirmed emergency medical condition->Emergency Medical Condition (MA) FINDINGS: Pulmonary Arteries: Pulmonary arteries are adequately opacified for evaluation. No evidence of intraluminal filling defect to suggest pulmonary embolism. Main pulmonary artery is normal in caliber. Mediastinum: There is aortic atherosclerotic calcification. No pericardial effusion. 1 cm right hilar lymph node. Lungs/pleura: There are emphysematous changes. There are faint multifocal ground-glass opacities in the right lower lobe, compatible with pneumonia. There is platelike atelectasis at the posterior left lung base. No effusion or pneumothorax. There is dependent mucoid material in the trachea and proximal right mainstem bronchus. Upper Abdomen: Limited images of the upper abdomen are unremarkable. Soft Tissues/Bones: Nonspecific 6 mm sclerotic lesion in the T7 vertebral body. No evidence of pulmonary emboli. Faint multifocal ground-glass opacities in the right lower lobe, compatible with pneumonia. Recommend follow-up to resolution.          Patient Instructions:   Current Discharge Medication List      START taking these medications    Details   predniSONE (DELTASONE) 20 MG tablet Take 2 tablets by mouth daily for 4 days  Qty: 8 tablet, Refills: 0      albuterol sulfate HFA (PROVENTIL HFA) 108 (90 Base) MCG/ACT inhaler Inhale 2 puffs into the lungs every 6 hours as needed for Wheezing  Qty: 1 Inhaler, Refills: 3         CONTINUE these medications which have NOT CHANGED    Details   benztropine (COGENTIN) 1 MG tablet Take 1 mg by mouth 2 times daily      lurasidone (LATUDA) 40 MG TABS tablet Take 40 mg by mouth daily      lidocaine 4 % external patch Place 1 patch onto the skin daily  Qty: 30 patch, Refills: 0      FLUoxetine (PROZAC) 20 MG capsule Take 20 mg by mouth daily Instructed to take with sip water am of procedure      busPIRone (BUSPAR) 10 MG tablet Take 10 mg by mouth 2 times daily Instructed to take with sip water am of procedure      tiotropium (SPIRIVA HANDIHALER) 18 MCG inhalation capsule Inhale 18 mcg into the lungs daily Use am dos      fluticasone-salmeterol (ADVAIR) 500-50 MCG/DOSE diskus inhaler Inhale 1 puff into the lungs every 12 hours Use am dos      gabapentin (NEURONTIN) 300 MG capsule Take 1,200 mg by mouth daily. Instructed to take with sip water am of procedure. clonazePAM (KLONOPIN) 0.5 MG tablet Take 1 mg by mouth 2 times daily Instructed to take with sip water am of procedure, if needed. Benedetta Ou STOP taking these medications       naproxen (NAPROSYN) 500 MG tablet Comments:   Reason for Stopping:         ibuprofen (ADVIL;MOTRIN) 400 MG tablet Comments:   Reason for Stopping:                 Note that more than 30 minutes was spent in preparing discharge papers, discussing discharge with patient, medication review, etc.    NOTE: This report was transcribed using voice recognition software. Every effort was made to ensure accuracy; however, inadvertent computerized transcription errors may be present.      Signed:  Electronically signed by Zayda Fregoso MD on 7/5/2021 at 12:38 PM

## 2021-07-05 NOTE — PROGRESS NOTES
Pt. Discharged. Discussed discharge instructions with pt and no further questions voiced. Pt. Called home for a ride. IV removed and telemetry brought back to nurses station.
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
## 2021-07-05 NOTE — PROGRESS NOTES
Physical Therapy        [id number]/[id number]    Patient unavailable for physical therapy treatment due to patient refusal stating \"I hurt too much\" in regards to completing therapy. Patient appears to be very fatigued and out of it. Will check back on patient at later time/date to attempt therapy session.      Sterling Stern, PTA  #[id number]

## 2021-07-05 NOTE — PROGRESS NOTES
Physical Therapy Treatment Note/Plan of Care    Room #:  0707/3730-77  Patient Name: Sabina Fulton  YOB: 1960  MRN: [id number]    Date of Service: 7/5/2021     Tentative placement recommendation: Subacute vs Home Health Physical Therapy if patient meets goals  Equipment recommendation: To be determined and [place]      Evaluating Physical Therapist: Meli Colin, PT, DPT     Specific  Provider Orders/Date/Referring Provider :   07/02/21 2000   PT evaluation and treat Start: 07/02/21 2000, End: 07/02/21 2000, ONE TIME, Standing Count: 1 Occurrences, R    Noe Urena MD Acknowledge New    Admitting Diagnosis:   Acute respiratory failure with hypoxia ([facility] [address] [J96.01]     Visit Diagnoses       Codes    Pneumonia of right lung due to infectious organism, unspecified part of lung     J18.9        Surgery: None    Patient Active Problem List   Diagnosis    Chronic pain of right knee    Osteoarthritis of spine with radiculopathy, cervical region    Cervical disc disorder    Cervical radiculopathy    Chronic pain syndrome    Acute pain of right knee    Acute respiratory failure with hypoxia ([address]    CAP (community acquired pneumonia)    COPD exacerbation ([address]    Depression       ASSESSMENT of Current Deficits Patient exhibits decreased strength, balance and endurance impairing functional mobility, transfers, gait , gait distance and tolerance to activity  Patient unstable with amb without device d/t dizziness and poor balance, needing a wheeled walker with inc safety with mobility. Patient fatigue quickly with standing exercises. Patient required cues for wheeled walker proximity while walking in room.       PHYSICAL THERAPY  PLAN OF CARE       Physical therapy plan of care is established based on physician order,  patient diagnosis and clinical assessment    Current Treatment Recommendations:    -Bed Mobility: Lower extremity exercises  and Trunk control activities   -Sitting Balance: Incorporate reaching activities to activate trunk muscles , Hands on support to maintain midline , Facilitate active trunk muscle engagement , Facilitate postural control in all planes  and Engage in core activities to allow for movement within base of support   -Standing Balance: Perform strengthening exercises in standing to promote motor control with or without upper extremity support , Instruct patient on adequate base of support to maintain balance and Challenge balance utilizing reaching  activities beyond center of gravity    -Transfers: Provide instruction on proper hand and foot position for adequate transfer of weight onto lower extremities and use of gait device, Cues for hand placement, technique and safety, Facilitate weight shift forward on to lower extremities and provide necessary stabilization of bilateral lower extremities , Support transfer of weight on to lower extremities, Assist with extension of knees trunk and hip to accept weight transfer  and Provide stabilization to prevent fall   -Gait: Gait training, Standing activities to improve: base of support, weight shift, weight bearing , Exercises to improve trunk control, Exercises to improve hip and knee control, Performance of protected weight bearing activities and Activities to increase weight bearing   -Endurance: Utilize Supervised activities to increase level of endurance to allow for safe functional mobility including transfers and gait  and Use graduated activities to promote good breathing techniques and provide support and education to maximize respiratory function    PT long term treatment goals are located in below grid    Patient and or family understand(s) diagnosis, prognosis, and plan of care. Frequency of treatments: Patient will be seen  3-5 times/week.          Prior Level of Function: Patient ambulated independently   Rehab Potential: good  for baseline    Past medical history:   Past Medical History:   Diagnosis Date    Alcoholism [facility])     H/O    COPD (chronic obstructive pulmonary disease) ([facility] [facility]     Dental caries     Hypertension     no meds    Lung nodules      [place]  Schizophrenia ([facility] [address]     stable    Ventricular hypokinesis     H/O     Past Surgical History:   Procedure Laterality Date    APPENDECTOMY       SECTION      2 C SECTION    ECHO COMPLETE  1/10/2014         [facility] DENTAL SURGERY PROCEDURE N/A 2018    MULTIPLE DENTAL EXTRACTIONS AND ALVELOPLASTY performed by Christen Pickard DDS at [address] [place] [address] Right     WRIST SURGERY Right        SUBJECTIVE:    Precautions: Up with assistance, falls and ETOH abuse, Schizophrenia   Social history: Patient lives alone  in a apartment    with No steps  to enter. Equipment owned: None,      [facility] [employer]       AM-PAC Mobility Inpatient   How much difficulty turning over in bed?: A Little  How much difficulty sitting down on / standing up from a chair with arms?: A Little  How much difficulty moving from lying on back to sitting on side of bed?: A Little  How much help from another person moving to and from a bed to a chair?: A Lot  How much help from another person needed to walk in hospital room?: A Lot  How much help from another person for climbing 3-5 steps with a railing?: A Lot  AM-PAC Inpatient Mobility Raw Score : 15  AM-PAC Inpatient T-Scale Score : 39.45  Mobility Inpatient CMS 0-100% Score: 57.7  Mobility Inpatient CMS G-Code Modifier : CK    Nursing cleared patient for PT treatment. OBJECTIVE;   Initial Evaluation  Date: 7/3/2021 Treatment Date:  2021     Short Term/ Long Term   Goals   Was pt agreeable to Eval/treatment?  Yes  yes To be met in 3 days   Pain level   5/10  Headache NA    Bed Mobility    Rolling: Supervision    Supine to sit: Supervision    Sit to supine: Supervision    Scooting: Supervision   Rolling: Supervision    Supine to sit: Supervision    Sit to supine: Supervision Scooting: Supervision     Rolling: Independent   Supine to sit: Independent   Sit to supine: Independent   Scooting: Independent    Transfers Sit to stand: Moderate assist of 1  Sit to stand: Minimal assist of 1 cues for hand placement and safety     Sit to stand: Independent    Ambulation    45 feet using  hand held assist with Moderate assist of 1    15 feet using  hand held assist with Moderate assist of 1   from patient standing at bedside to bathroom. 10 feet with wheeled walker and supervision, cues for safety and walker proximity. > 200 feet using  least restrictive device versus no device with Independent    Stair negotiation: ascended and descended   Not assessed        ROM Within functional limits   Increase range of motion 10% of affected joints    Strength BUE:  refer to OT eval  RLE:  4-/5  LLE:  4-/5  Increase strength in affected mm groups by 1/3 grade   Balance Sitting EOB:  fair-  Dynamic Standing:  fair- Sitting EOB: good   Dynamic Standing: fair with wheeled walker, poor without AD or support   Sitting EOB:  fair +  Dynamic Standing: fair +     Patient is Alert & Oriented x person and situation and follows one step directions   Sensation:  Patient  denies numbness/tingling  Edema:  no   Endurance: fair     Vitals: room air   Blood Pressure at rest  Blood Pressure during session    Heart Rate at rest  Heart Rate during session    SPO2 at rest  SPO2 during session      Patient education  Patient educated on role of Physical Therapy, risks of immobility, safety and plan of care, energy conservation,  importance of mobility while in hospital , importance and purpose of adaptive device and adjusted to proper height for the patient.  and safety      Patient response to education:   Pt verbalized understanding Pt demonstrated skill Pt requires further education in this area   Yes Partial Yes      Treatment:  Patient practiced and was instructed/facilitated in the following treatment: Patient

## 2021-07-05 NOTE — CARE COORDINATION
CM Note: 7/5/2021 at 11:31am: COVID NEGATIVE - test done on 7/2. SW consult received for \"Patient requesting Living Will Information\". CM talked to hte patient in her room for transition of care. She appears to be sleepy throughout the conversation and at times, CM would have to repeat the question several times. She states she lives alone in an efficiency which is located inside a duplex with 5 steps to enter without a rail. She states she is not very independent with her ADL's and has been feeling weak. She states her son occasionally stays over to help her. She also has a daughter that assists her. She does not drive and states [facility] takes her to her appts. States her CM at [facility] [place] is [employer]. States no home O2 or nebulizer and is currently on RA. DME: cane. She states she has had [facility] in the past, but unsure of name. She also states she has been to [facility] in the past. States she does not know the name of her PCP - chart lists as Jitendra VALENTINO - CNP, but again states she does not know. States she gets her pills from [facility] [place]. Living Will information given to her, but states she wants to discuss and review with her daughter, who helps her do her paperwork. Discussed PT recommendations for [facility] vs HHC - but she adamently refuses to go to a SNF. States she will think about HHC, but again states she wants to discuss this with her daughter. Choice list for both SNF and [facility] left in room for her to review with her daughter. Unit CM / TAO to follow.  Debbie Reddy RN

## 2021-07-05 NOTE — CARE COORDINATION
CM Note: 7/5/2021 at 2:47pm: COVID NEGATIVE - test done on 7/2. Noted that patient is being discharged today. CM again talked to patient about PT / OT recommendations for [facility] vs HHC. Daughter in room and states she thought a SNF would be a good idea for her mom to gain some strength. Patient adamently refused SNF placement - she states \" I am going home\" and told her daughter that Bo Christine isn't going to decide where she is going to go\". Daughter than states to her mom, \"It's up to you then. \"  She states she would be receptive to [address] and would like a wheeled walker for home. DME choice list reviewed with her and she selected [facility]. She states she is not going to stay here at the hospital to wait for the walker and said they can just bring it to her house later on today. Orders received for both [address] and [facility]. Called Madison King at [facility] and they accepted the referral. Informed her that the patient does not want to stay to wait for the [address], so Madison King states they will deliver it to her house today. Daughter and the patient are in agreement with that and states she will help her into the house and she also has a cane she can use. Reviewed choice list for [address] and she selected \[facility]\"" and was told that the referral office is closed today, but she would transfer me to the core nurse Renetta Gaxiola. VM left. Core nurse Renetta Gaxiola returned call and took all the information, but states no one is in the office today to take new referrals and they would check the referrals tomorrow as well as the patient's benefits. She states they will follow up in am. Both patient and her daughter informed and both were in agreement. Dr. Mingo Flaherty also updated on the above. Daughter was providing transportation home.  Virgie Soto RN

## 2021-07-05 NOTE — PLAN OF CARE
Problem: Falls - Risk of:  Goal: Will remain free from falls  Description: Will remain free from falls  7/5/2021 0315 by Jair Enriquez RN  Outcome: Met This Shift     Problem: Falls - Risk of:  Goal: Absence of physical injury  Description: Absence of physical injury  7/5/2021 0315 by Jair Enriquez RN  Outcome: Met This Shift     Problem:  Activity Intolerance:  Goal: Ability to perform activities of daily living will improve  Description: Ability to perform activities of daily living will improve  7/5/2021 0315 by Jair Enriquez RN  Outcome: Met This Shift     Problem: Airway Clearance - Ineffective:  Goal: Ability to maintain a clear airway will improve  Description: Ability to maintain a clear airway will improve  7/5/2021 0315 by Jair Enriquez RN  Outcome: Met This Shift

## 2021-07-06 LAB
CULTURE, RESPIRATORY: NORMAL
SMEAR, RESPIRATORY: NORMAL

## 2021-07-07 LAB
BLOOD CULTURE, ROUTINE: NORMAL
CULTURE, BLOOD 2: NORMAL

## 2021-07-19 ENCOUNTER — TELEPHONE (OUTPATIENT)
Dept: CASE MANAGEMENT | Age: 61
End: 2021-07-19

## 2021-07-19 NOTE — TELEPHONE ENCOUNTER
I called the patient and she confirmed her CT lung screening at [address] [place] [address] on 7/20/2021 at 12:00 pm.  I reminded the patient to arrive at 11:30 am, enter through the main entrance, and register. Patient confirmed.           Electronically signed by Lisa Tabor on 7/19/21 at 2:44 PM EDT

## 2021-08-18 ENCOUNTER — TELEPHONE (OUTPATIENT)
Dept: CASE MANAGEMENT | Age: 61
End: 2021-08-18

## 2021-08-18 NOTE — TELEPHONE ENCOUNTER
I called the patient and she confirmed her CT lung screening at [facility] on 8/19/2021 at 1:15 pm.  I reminded the patient to arrive at 12:45 pm, enter through the main entrance, and register. Patient confirmed.             Electronically signed by Madhuri Clark on 8/18/21 at 1:50 PM EDT

## 2021-09-06 ENCOUNTER — HOSPITAL ENCOUNTER (EMERGENCY)
Age: 61
Discharge: LEFT BEFORE DISCHARGE | End: 2021-09-06
Payer: COMMERCIAL

## 2021-09-06 VITALS
OXYGEN SATURATION: 94 % | WEIGHT: 154 LBS | HEIGHT: 71 IN | BODY MASS INDEX: 21.56 KG/M2 | TEMPERATURE: 98.2 F | HEART RATE: 105 BPM | RESPIRATION RATE: 20 BRPM

## 2021-09-06 PROCEDURE — 99283 EMERGENCY DEPT VISIT LOW MDM: CPT

## 2021-09-06 NOTE — ED NOTES
FIRST PROVIDER CONTACT ASSESSMENT NOTE                                                                                                Department of Emergency Medicine                                                      First Provider Note  21  2:30 PM EDT  NAME: Sabina Fulton  : 1960  MRN: [id number]    Chief Complaint: Cough (Pt presents today for cough and sob. ) and Shortness of Breath      History of Present Illness:   Sabina Fulton is a 63 y.o. female who presents to the ED for cough, SOB. Focused Physical Exam:  VS:    ED Triage Vitals [21 1411]   BP Temp Temp Source Pulse Resp SpO2 Height Weight   -- 98.2 °F (36.8 °C) Infrared 105 20 94 % 5' 11\" (1.803 m) 154 lb (69.9 kg)        General: Alert and in no apparent distress. Medical History:  has a past medical history of Alcoholism ([facility] COPD (chronic obstructive pulmonary disease) ([facility] Dental caries, Hypertension, Lung nodules, Schizophrenia ([facility] and Ventricular hypokinesis. Surgical History:  has a past surgical history that includes Echo Complete (1/10/2014); Appendectomy;  section; Wrist surgery (Right); shoulder surgery (Right); and pr dental surgery procedure (N/A, 2018). Social History:  reports that she has been smoking. She has a 42.00 pack-year smoking history. She has never used smokeless tobacco. She reports current alcohol use. She reports that she does not use drugs. Family History: family history includes Cancer in her father; Other in her mother.     Allergies: Codeine, Dust mite extract, Aspirin, and Penicillins     Initial Plan of Care:  Initiate Treatment-Testing, Proceed toTreatment Area When Bed Available for ED Attending/MLP to Continue Care    -------------------------------------------------END OF FIRST PROVIDER CONTACT ASSESSMENT NOTE--------------------------------------------------------  Electronically signed by MAUDE Juares   DD: 21       Christian Powell, PA  09/06/21 2717

## 2021-09-07 ENCOUNTER — HOSPITAL ENCOUNTER (EMERGENCY)
Age: 61
Discharge: HOME | End: 2021-09-07
Payer: COMMERCIAL

## 2021-09-07 VITALS — HEART RATE: 114 BPM | OXYGEN SATURATION: 92 % | TEMPERATURE: 97.1 F

## 2021-09-14 ENCOUNTER — HOSPITAL ENCOUNTER (INPATIENT)
Age: 61
LOS: 1 days | Discharge: HOME | DRG: 140 | End: 2021-09-17
Attending: EMERGENCY MEDICINE | Admitting: INTERNAL MEDICINE
Payer: COMMERCIAL

## 2021-09-14 ENCOUNTER — APPOINTMENT (OUTPATIENT)
Dept: GENERAL RADIOLOGY | Age: 61
DRG: 140 | End: 2021-09-14
Payer: COMMERCIAL

## 2021-09-14 DIAGNOSIS — R06.03 RESPIRATORY DISTRESS: Primary | ICD-10-CM

## 2021-09-14 DIAGNOSIS — J44.1 COPD EXACERBATION (HCC): ICD-10-CM

## 2021-09-14 LAB
ALBUMIN SERPL-MCNC: 3.9 G/DL (ref 3.5–5.2)
ALP BLD-CCNC: 114 U/L (ref 35–104)
ALT SERPL-CCNC: 12 U/L (ref 0–32)
ANION GAP SERPL CALCULATED.3IONS-SCNC: 12 MMOL/L (ref 7–16)
APTT: 31.8 SEC (ref 24.5–35.1)
AST SERPL-CCNC: 20 U/L (ref 0–31)
B.E.: -1 MMOL/L (ref -3–3)
BASOPHILS ABSOLUTE: 0.06 E9/L (ref 0–0.2)
BASOPHILS RELATIVE PERCENT: 0.6 % (ref 0–2)
BILIRUB SERPL-MCNC: 0.8 MG/DL (ref 0–1.2)
BUN BLDV-MCNC: 16 MG/DL (ref 6–20)
CALCIUM SERPL-MCNC: 9.4 MG/DL (ref 8.6–10.2)
CHLORIDE BLD-SCNC: 110 MMOL/L (ref 98–107)
CO2: 23 MMOL/L (ref 22–29)
CREAT SERPL-MCNC: 1.5 MG/DL (ref 0.5–1)
DELIVERY SYSTEMS: ABNORMAL
DEVICE: ABNORMAL
EOSINOPHILS ABSOLUTE: 0.14 E9/L (ref 0.05–0.5)
EOSINOPHILS RELATIVE PERCENT: 1.3 % (ref 0–6)
FIO2 ARTERIAL: 7
GFR AFRICAN AMERICAN: 43
GFR NON-AFRICAN AMERICAN: 35 ML/MIN/1.73
GLUCOSE BLD-MCNC: 89 MG/DL (ref 74–99)
HCO3 ARTERIAL: 25 MMOL/L (ref 22–26)
HCT VFR BLD CALC: 43.1 % (ref 34–48)
HEMOGLOBIN: 14.4 G/DL (ref 11.5–15.5)
IMMATURE GRANULOCYTES #: 0.03 E9/L
IMMATURE GRANULOCYTES %: 0.3 % (ref 0–5)
INR BLD: 1
LACTIC ACID, SEPSIS: 2.1 MMOL/L (ref 0.5–1.9)
LYMPHOCYTES ABSOLUTE: 2 E9/L (ref 1.5–4)
LYMPHOCYTES RELATIVE PERCENT: 18.8 % (ref 20–42)
MAGNESIUM: 1.8 MG/DL (ref 1.6–2.6)
MCH RBC QN AUTO: 32.9 PG (ref 26–35)
MCHC RBC AUTO-ENTMCNC: 33.4 % (ref 32–34.5)
MCV RBC AUTO: 98.4 FL (ref 80–99.9)
MONOCYTES ABSOLUTE: 1.11 E9/L (ref 0.1–0.95)
MONOCYTES RELATIVE PERCENT: 10.4 % (ref 2–12)
NEUTROPHILS ABSOLUTE: 7.29 E9/L (ref 1.8–7.3)
NEUTROPHILS RELATIVE PERCENT: 68.6 % (ref 43–80)
O2 SATURATION: 99.5 % (ref 92–98.5)
OPERATOR ID: 30
PCO2 ARTERIAL: 45.7 MMHG (ref 35–45)
PDW BLD-RTO: 13.6 FL (ref 11.5–15)
PH BLOOD GAS: 7.35 (ref 7.35–7.45)
PLATELET # BLD: 274 E9/L (ref 130–450)
PMV BLD AUTO: 11.1 FL (ref 7–12)
PO2 ARTERIAL: 182.4 MMHG (ref 80–100)
POTASSIUM SERPL-SCNC: 4.4 MMOL/L (ref 3.5–5)
PRO-BNP: 1993 PG/ML (ref 0–125)
PROTHROMBIN TIME: 12 SEC (ref 9.3–12.4)
RBC # BLD: 4.38 E12/L (ref 3.5–5.5)
SARS-COV-2, NAAT: NOT DETECTED
SODIUM BLD-SCNC: 145 MMOL/L (ref 132–146)
SOURCE, BLOOD GAS: ABNORMAL
TOTAL PROTEIN: 6.8 G/DL (ref 6.4–8.3)
TROPONIN, HIGH SENSITIVITY: 7 NG/L (ref 0–9)
WBC # BLD: 10.6 E9/L (ref 4.5–11.5)

## 2021-09-14 PROCEDURE — 6370000000 HC RX 637 (ALT 250 FOR IP): Performed by: EMERGENCY MEDICINE

## 2021-09-14 PROCEDURE — 87040 BLOOD CULTURE FOR BACTERIA: CPT

## 2021-09-14 PROCEDURE — 87635 SARS-COV-2 COVID-19 AMP PRB: CPT

## 2021-09-14 PROCEDURE — 85025 COMPLETE CBC W/AUTO DIFF WBC: CPT

## 2021-09-14 PROCEDURE — 71045 X-RAY EXAM CHEST 1 VIEW: CPT

## 2021-09-14 PROCEDURE — 94640 AIRWAY INHALATION TREATMENT: CPT

## 2021-09-14 PROCEDURE — 2580000003 HC RX 258: Performed by: EMERGENCY MEDICINE

## 2021-09-14 PROCEDURE — 82803 BLOOD GASES ANY COMBINATION: CPT

## 2021-09-14 PROCEDURE — 96374 THER/PROPH/DIAG INJ IV PUSH: CPT

## 2021-09-14 PROCEDURE — 83605 ASSAY OF LACTIC ACID: CPT

## 2021-09-14 PROCEDURE — 83880 ASSAY OF NATRIURETIC PEPTIDE: CPT

## 2021-09-14 PROCEDURE — 80053 COMPREHEN METABOLIC PANEL: CPT

## 2021-09-14 PROCEDURE — 6360000002 HC RX W HCPCS: Performed by: EMERGENCY MEDICINE

## 2021-09-14 PROCEDURE — 2500000003 HC RX 250 WO HCPCS: Performed by: EMERGENCY MEDICINE

## 2021-09-14 PROCEDURE — 83735 ASSAY OF MAGNESIUM: CPT

## 2021-09-14 PROCEDURE — 93005 ELECTROCARDIOGRAM TRACING: CPT | Performed by: EMERGENCY MEDICINE

## 2021-09-14 PROCEDURE — 84484 ASSAY OF TROPONIN QUANT: CPT

## 2021-09-14 PROCEDURE — 85610 PROTHROMBIN TIME: CPT

## 2021-09-14 PROCEDURE — 6360000002 HC RX W HCPCS: Performed by: INTERNAL MEDICINE

## 2021-09-14 PROCEDURE — 99284 EMERGENCY DEPT VISIT MOD MDM: CPT

## 2021-09-14 PROCEDURE — 99220 PR INITIAL OBSERVATION CARE/DAY 70 MINUTES: CPT | Performed by: INTERNAL MEDICINE

## 2021-09-14 PROCEDURE — 96375 TX/PRO/DX INJ NEW DRUG ADDON: CPT

## 2021-09-14 PROCEDURE — 36415 COLL VENOUS BLD VENIPUNCTURE: CPT

## 2021-09-14 PROCEDURE — 85730 THROMBOPLASTIN TIME PARTIAL: CPT

## 2021-09-14 PROCEDURE — G0378 HOSPITAL OBSERVATION PER HR: HCPCS

## 2021-09-14 PROCEDURE — 94664 DEMO&/EVAL PT USE INHALER: CPT

## 2021-09-14 RX ORDER — IPRATROPIUM BROMIDE AND ALBUTEROL SULFATE 2.5; .5 MG/3ML; MG/3ML
1 SOLUTION RESPIRATORY (INHALATION) EVERY 4 HOURS PRN
Status: DISCONTINUED | OUTPATIENT
Start: 2021-09-14 | End: 2021-09-17 | Stop reason: HOSPADM

## 2021-09-14 RX ORDER — GUAIFENESIN 100 MG/5ML
200 SOLUTION ORAL EVERY 6 HOURS PRN
Status: DISCONTINUED | OUTPATIENT
Start: 2021-09-14 | End: 2021-09-17 | Stop reason: HOSPADM

## 2021-09-14 RX ORDER — IPRATROPIUM BROMIDE AND ALBUTEROL SULFATE 2.5; .5 MG/3ML; MG/3ML
3 SOLUTION RESPIRATORY (INHALATION) ONCE
Status: COMPLETED | OUTPATIENT
Start: 2021-09-14 | End: 2021-09-14

## 2021-09-14 RX ORDER — LIDOCAINE HYDROCHLORIDE 10 MG/ML
5 INJECTION, SOLUTION EPIDURAL; INFILTRATION; INTRACAUDAL; PERINEURAL ONCE
Status: COMPLETED | OUTPATIENT
Start: 2021-09-14 | End: 2021-09-14

## 2021-09-14 RX ORDER — METHYLPREDNISOLONE SODIUM SUCCINATE 125 MG/2ML
60 INJECTION, POWDER, LYOPHILIZED, FOR SOLUTION INTRAMUSCULAR; INTRAVENOUS DAILY
Status: DISCONTINUED | OUTPATIENT
Start: 2021-09-14 | End: 2021-09-16

## 2021-09-14 RX ORDER — ONDANSETRON 2 MG/ML
4 INJECTION INTRAMUSCULAR; INTRAVENOUS ONCE
Status: COMPLETED | OUTPATIENT
Start: 2021-09-14 | End: 2021-09-14

## 2021-09-14 RX ORDER — 0.9 % SODIUM CHLORIDE 0.9 %
30 INTRAVENOUS SOLUTION INTRAVENOUS ONCE
Status: COMPLETED | OUTPATIENT
Start: 2021-09-14 | End: 2021-09-14

## 2021-09-14 RX ORDER — GUAIFENESIN 100 MG/5ML
200 SOLUTION ORAL ONCE
Status: COMPLETED | OUTPATIENT
Start: 2021-09-14 | End: 2021-09-14

## 2021-09-14 RX ORDER — TRAZODONE HYDROCHLORIDE 50 MG/1
50-100 TABLET ORAL NIGHTLY PRN
Status: ON HOLD | COMMUNITY
End: 2021-11-10 | Stop reason: HOSPADM

## 2021-09-14 RX ADMIN — GUAIFENESIN 200 MG: 200 SOLUTION ORAL at 17:01

## 2021-09-14 RX ADMIN — SODIUM CHLORIDE 1974 ML: 9 INJECTION, SOLUTION INTRAVENOUS at 17:02

## 2021-09-14 RX ADMIN — LIDOCAINE HYDROCHLORIDE 5 ML: 10 INJECTION, SOLUTION EPIDURAL; INFILTRATION; INTRACAUDAL; PERINEURAL at 17:00

## 2021-09-14 RX ADMIN — IPRATROPIUM BROMIDE AND ALBUTEROL SULFATE 3 AMPULE: .5; 3 SOLUTION RESPIRATORY (INHALATION) at 17:00

## 2021-09-14 RX ADMIN — ONDANSETRON 4 MG: 2 INJECTION INTRAMUSCULAR; INTRAVENOUS at 17:00

## 2021-09-14 RX ADMIN — METHYLPREDNISOLONE SODIUM SUCCINATE 60 MG: 125 INJECTION, POWDER, FOR SOLUTION INTRAMUSCULAR; INTRAVENOUS at 19:39

## 2021-09-14 ASSESSMENT — ENCOUNTER SYMPTOMS
ABDOMINAL PAIN: 0
NAUSEA: 1
SORE THROAT: 0
VOMITING: 0
WHEEZING: 1
COUGH: 1
CHEST TIGHTNESS: 0
SINUS PRESSURE: 0
BACK PAIN: 0
DIARRHEA: 0
SHORTNESS OF BREATH: 1

## 2021-09-14 NOTE — Clinical Note
Patient Class: Inpatient [101]   REQUIRED: Diagnosis: Acute exacerbation of chronic [facility] disease ([facility] [027459]   Estimated Length of Stay: Estimated stay of more than 2 midnights   Admitting Provider: Ashley Ling [[id number]]   Telemetry/Cardiac Monitoring Required?: Yes

## 2021-09-14 NOTE — ED NOTES
Bed: 01  Expected date:   Expected time:   Means of arrival:   Comments:  JOSE L Zazueta RN  09/14/21 4040

## 2021-09-14 NOTE — ED PROVIDER NOTES
Chief complaint:  Short of breath    HPI history provided by the patient and EMS  Patient presents here by ambulance, received steroids as well as breathing treatment as well as supplemental oxygen in route. Patient is complaining of several days of worsening cough and wheezing and shortness of breath significantly worsening today. Does not use oxygen at home but does use rescue inhalers. Does continue to smoke cigarettes. Denies chest pain or palpitations. No abdominal pain. Somewhat nauseated now. Symptoms are worse with exertion and improving with oxygen and treatments. No fevers. No stiff neck or headache. No abdominal pain. No leg pain or swelling. No history of blood clots. Review of Systems   Constitutional: Negative for chills, diaphoresis, fatigue and fever. HENT: Positive for congestion. Negative for sinus pressure and sore throat. Respiratory: Positive for cough, shortness of breath and wheezing. Negative for chest tightness. Cardiovascular: Negative for chest pain, palpitations and leg swelling. Gastrointestinal: Positive for nausea. Negative for abdominal pain, diarrhea and vomiting. Genitourinary: Negative for dysuria, flank pain, frequency and urgency. Musculoskeletal: Negative for arthralgias, back pain, gait problem, joint swelling, myalgias, neck pain and neck stiffness. Skin: Negative for rash and wound. Neurological: Negative for dizziness, seizures, syncope, weakness, light-headedness, numbness and headaches. Hematological: Negative for adenopathy. All other systems reviewed and are negative. Physical Exam  Vitals and nursing note reviewed. Constitutional:       General: She is in acute distress. Appearance: She is well-developed. She is not ill-appearing, toxic-appearing or diaphoretic.       Comments: Accessory muscle use with coughing and audible wheezing with tachypnea and some respiratory distress   HENT:      Head: Normocephalic and atraumatic. Comments: No stridor, no trismus  Eyes:      Pupils: Pupils are equal, round, and reactive to light. Cardiovascular:      Rate and Rhythm: Normal rate and regular rhythm. Heart sounds: Normal heart sounds. No murmur heard. Pulmonary:      Effort: Tachypnea, accessory muscle usage and respiratory distress present. Breath sounds: Decreased breath sounds and wheezing present. No rhonchi or rales. Chest:      Chest wall: No tenderness. Abdominal:      General: Bowel sounds are normal. There is no distension. Palpations: Abdomen is soft. There is no pulsatile mass. Tenderness: There is no abdominal tenderness. There is no right CVA tenderness, left CVA tenderness, guarding or rebound. Musculoskeletal:      Cervical back: Normal range of motion and neck supple. Right lower leg: No tenderness. No edema. Left lower leg: No tenderness. No edema. Comments: No pretibial edema or calf pain. Skin:     General: Skin is warm and dry. Coloration: Skin is not cyanotic, jaundiced, mottled or pale. Findings: No erythema. Nails: There is no clubbing. Neurological:      General: No focal deficit present. Mental Status: She is alert and oriented to person, place, and time. GCS: GCS eye subscore is 4. GCS verbal subscore is 5. GCS motor subscore is 6. Cranial Nerves: Cranial nerves are intact. No cranial nerve deficit. Sensory: Sensation is intact. Motor: Motor function is intact. Coordination: Coordination is intact. Coordination normal.   Psychiatric:         Behavior: Behavior is cooperative. Procedures     [facility]     ED Course as of Sep 14 1824   Tue Sep 14, 2021   1804 Patient sitting up in the bed, overall significant improvement with no particular distress at this time although still has some tachypnea. Lung sounds are significant improved, still has wheezing but has significant proved aeration bilaterally.   Still having intermittent coughing spells. Work-up results to this point are discussed. [NC]   7104 Case discussed with Dr. Marshal Laguerre, detailed overview given, he will admit the patient. [NC]      ED Course User Index  [NC] Kimi Gould DO     EKG Interpretation    Interpreted by emergency department physician    Rhythm: sinus tachycardia  Rate: 103  Axis: normal  Ectopy: none  Conduction: normal  ST Segments: no acute change  T Waves: no acute change  Q Waves: none    Clinical Impression: sinus tachycardia    Kimi Gould DO     ED Course as of Sep 14 1825   Tue Sep 14, 2021   180 Patient sitting up in the bed, overall significant improvement with no particular distress at this time although still has some tachypnea. Lung sounds are significant improved, still has wheezing but has significant proved aeration bilaterally. Still having intermittent coughing spells. Work-up results to this point are discussed. [NC]   3000 Case discussed with Dr. Marshal Laguerre, detailed overview given, he will admit the patient. [NC]      ED Course User Index  [NC] Merlene Martinez DO       --------------------------------------------- PAST HISTORY ---------------------------------------------  Past Medical History:  has a past medical history of Alcoholism ([facility] Dental caries, Hypertension, Lung nodules, Schizophrenia ([facility] and Ventricular hypokinesis. Past Surgical History:  has a past surgical history that includes Echo Complete (1/10/2014); Appendectomy;  section; Wrist surgery (Right); shoulder surgery (Right); and pr dental surgery procedure (N/A, 2018). Social History:  reports that she has been smoking. She has a 42.00 pack-year smoking history. She has never used smokeless tobacco. She reports current alcohol use. She reports that she does not use drugs. Family History: family history includes Cancer in her father;  Other in her mother. The patients home medications have been reviewed.     Allergies: Codeine, Dust mite extract, Aspirin, and Penicillins    -------------------------------------------------- RESULTS -------------------------------------------------    LABS:  Results for orders placed or performed during the hospital encounter of 09/14/21   COVID-19, Rapid    Specimen: Nasopharyngeal Swab   Result Value Ref Range    SARS-CoV-2, NAAT Not Detected Not Detected   Lactate, Sepsis   Result Value Ref Range    Lactic Acid, Sepsis 2.1 (H) 0.5 - 1.9 mmol/L   CBC Auto Differential   Result Value Ref Range    WBC 10.6 4.5 - 11.5 E9/L    RBC 4.38 3.50 - 5.50 E12/L    Hemoglobin 14.4 11.5 - 15.5 g/dL    Hematocrit 43.1 34.0 - 48.0 %    MCV 98.4 80.0 - 99.9 fL    MCH 32.9 26.0 - 35.0 pg    MCHC 33.4 32.0 - 34.5 %    RDW 13.6 11.5 - 15.0 fL    Platelets 909 366 - 741 E9/L    MPV 11.1 7.0 - 12.0 fL    Neutrophils % 68.6 43.0 - 80.0 %    Immature Granulocytes % 0.3 0.0 - 5.0 %    Lymphocytes % 18.8 (L) 20.0 - 42.0 %    Monocytes % 10.4 2.0 - 12.0 %    Eosinophils % 1.3 0.0 - 6.0 %    Basophils % 0.6 0.0 - 2.0 %    Neutrophils Absolute 7.29 1.80 - 7.30 E9/L    Immature Granulocytes # 0.03 E9/L    Lymphocytes Absolute 2.00 1.50 - 4.00 E9/L    Monocytes Absolute 1.11 (H) 0.10 - 0.95 E9/L    Eosinophils Absolute 0.14 0.05 - 0.50 E9/L    Basophils Absolute 0.06 0.00 - 0.20 E9/L   Protime-INR   Result Value Ref Range    Protime 12.0 9.3 - 12.4 sec    INR 1.0    APTT   Result Value Ref Range    aPTT 31.8 24.5 - 35.1 sec   Arterial Blood Gas, Respiratory Only   Result Value Ref Range    Source: Arterial     FIO2 Arterial 7.0     pH, Blood Gas 7.347 (L) 7.350 - 7.450    pCO2, Arterial 45.7 (H) 35.0 - 45.0 mmHg    pO2, Arterial 182.4 (H) 80.0 - 100.0 mmHg    HCO3, Arterial 25.0 22.0 - 26.0 mmol/L    B.E. -1.0 -3.0 - 3.0 mmol/L    O2 Sat 99.5 (H) 92.0 - 98.5 %     ID 30     DEVICE [id number]     Delivery Systems Cannula    EKG 12 Lead Result Value Ref Range    Ventricular Rate 103 BPM    Atrial Rate 69 BPM    QRS Duration 86 ms    Q-T Interval 348 ms    QTc Calculation (Bazett) 455 ms    R Axis 69 degrees    T Axis 72 degrees       RADIOLOGY:  XR CHEST PORTABLE   Final Result   No acute pulmonary process                 ------------------------- NURSING NOTES AND VITALS REVIEWED ---------------------------  Date / Time Roomed:  9/14/2021  4:40 PM  ED Bed Assignment:  01/01    The nursing notes within the ED encounter and vital signs as below have been reviewed. Patient Vitals for the past 24 hrs:   BP Temp Pulse Resp SpO2 Weight   09/14/21 1743 125/81  124 26 97 %    09/14/21 1653 138/79  84 20 100 % 145 lb (65.8 kg)   09/14/21 1644  98.4 °F (36.9 °C) 146          Oxygen Saturation Interpretation: Normal    ----------------------------------------    Counseling:  I have spoken with the patient and discussed todays results, in addition to providing specific details for the plan of care and counseling regarding the diagnosis and prognosis. Their questions are answered at this time and they are agreeable with the plan of admission.    --------------------------------- ADDITIONAL PROVIDER NOTES ---------------------------------    This patient's ED course included: a personal history and physicial examination, re-evaluation prior to disposition, multiple bedside re-evaluations, IV medications, cardiac monitoring and continuous pulse oximetry    This patient has remained hemodynamically stable during their ED course. Diagnosis:  1. Respiratory distress    2. COPD exacerbation ([facility]        Disposition:  Patient's disposition: Admit to [facility]  Patient's condition is stable.          Natalie Fraser DO  09/14/21 1825

## 2021-09-15 PROBLEM — F20.9 SCHIZOPHRENIA (HCC): Status: ACTIVE | Noted: 2021-09-15

## 2021-09-15 PROBLEM — N17.9 AKI (ACUTE KIDNEY INJURY) (HCC): Status: ACTIVE | Noted: 2021-09-15

## 2021-09-15 PROBLEM — J96.02 ACUTE RESPIRATORY FAILURE WITH HYPOXIA AND HYPERCAPNIA (HCC): Status: ACTIVE | Noted: 2021-07-02

## 2021-09-15 LAB
EKG ATRIAL RATE: 69 BPM
EKG Q-T INTERVAL: 348 MS
EKG QRS DURATION: 86 MS
EKG QTC CALCULATION (BAZETT): 455 MS
EKG R AXIS: 69 DEGREES
EKG T AXIS: 72 DEGREES
EKG VENTRICULAR RATE: 103 BPM
LACTIC ACID, SEPSIS: 2.7 MMOL/L (ref 0.5–1.9)

## 2021-09-15 PROCEDURE — APPSS30 APP SPLIT SHARED TIME 16-30 MINUTES: Performed by: NURSE PRACTITIONER

## 2021-09-15 PROCEDURE — 96376 TX/PRO/DX INJ SAME DRUG ADON: CPT

## 2021-09-15 PROCEDURE — 6370000000 HC RX 637 (ALT 250 FOR IP): Performed by: INTERNAL MEDICINE

## 2021-09-15 PROCEDURE — 2700000000 HC OXYGEN THERAPY PER DAY

## 2021-09-15 PROCEDURE — G0378 HOSPITAL OBSERVATION PER HR: HCPCS

## 2021-09-15 PROCEDURE — 99226 PR SBSQ OBSERVATION CARE/DAY 35 MINUTES: CPT | Performed by: INTERNAL MEDICINE

## 2021-09-15 PROCEDURE — 2580000003 HC RX 258: Performed by: INTERNAL MEDICINE

## 2021-09-15 PROCEDURE — 96372 THER/PROPH/DIAG INJ SC/IM: CPT

## 2021-09-15 PROCEDURE — 94640 AIRWAY INHALATION TREATMENT: CPT

## 2021-09-15 PROCEDURE — 6360000002 HC RX W HCPCS: Performed by: INTERNAL MEDICINE

## 2021-09-15 RX ORDER — ALBUTEROL SULFATE 2.5 MG/3ML
2.5 SOLUTION RESPIRATORY (INHALATION) EVERY 6 HOURS PRN
Status: DISCONTINUED | OUTPATIENT
Start: 2021-09-15 | End: 2021-09-15

## 2021-09-15 RX ORDER — SODIUM CHLORIDE 9 MG/ML
25 INJECTION, SOLUTION INTRAVENOUS PRN
Status: DISCONTINUED | OUTPATIENT
Start: 2021-09-15 | End: 2021-09-17 | Stop reason: HOSPADM

## 2021-09-15 RX ORDER — POLYETHYLENE GLYCOL 3350 17 G/17G
17 POWDER, FOR SOLUTION ORAL DAILY PRN
Status: DISCONTINUED | OUTPATIENT
Start: 2021-09-15 | End: 2021-09-17 | Stop reason: HOSPADM

## 2021-09-15 RX ORDER — PROMETHAZINE HYDROCHLORIDE 25 MG/1
12.5 TABLET ORAL EVERY 6 HOURS PRN
Status: DISCONTINUED | OUTPATIENT
Start: 2021-09-15 | End: 2021-09-17 | Stop reason: HOSPADM

## 2021-09-15 RX ORDER — CLONAZEPAM 1 MG/1
1 TABLET ORAL 2 TIMES DAILY
Status: DISCONTINUED | OUTPATIENT
Start: 2021-09-15 | End: 2021-09-17 | Stop reason: HOSPADM

## 2021-09-15 RX ORDER — TRAZODONE HYDROCHLORIDE 50 MG/1
50 TABLET ORAL NIGHTLY PRN
Status: DISCONTINUED | OUTPATIENT
Start: 2021-09-15 | End: 2021-09-17 | Stop reason: HOSPADM

## 2021-09-15 RX ORDER — ARFORMOTEROL TARTRATE 15 UG/2ML
15 SOLUTION RESPIRATORY (INHALATION) 2 TIMES DAILY
Status: DISCONTINUED | OUTPATIENT
Start: 2021-09-15 | End: 2021-09-17 | Stop reason: HOSPADM

## 2021-09-15 RX ORDER — ACETAMINOPHEN 325 MG/1
650 TABLET ORAL EVERY 6 HOURS PRN
Status: DISCONTINUED | OUTPATIENT
Start: 2021-09-15 | End: 2021-09-17 | Stop reason: HOSPADM

## 2021-09-15 RX ORDER — ONDANSETRON 2 MG/ML
4 INJECTION INTRAMUSCULAR; INTRAVENOUS EVERY 6 HOURS PRN
Status: DISCONTINUED | OUTPATIENT
Start: 2021-09-15 | End: 2021-09-17 | Stop reason: HOSPADM

## 2021-09-15 RX ORDER — BENZTROPINE MESYLATE 1 MG/1
1 TABLET ORAL 2 TIMES DAILY
Status: DISCONTINUED | OUTPATIENT
Start: 2021-09-15 | End: 2021-09-17 | Stop reason: HOSPADM

## 2021-09-15 RX ORDER — ALBUTEROL SULFATE 90 UG/1
2 AEROSOL, METERED RESPIRATORY (INHALATION) EVERY 6 HOURS PRN
Status: DISCONTINUED | OUTPATIENT
Start: 2021-09-15 | End: 2021-09-15 | Stop reason: CLARIF

## 2021-09-15 RX ORDER — BUSPIRONE HYDROCHLORIDE 10 MG/1
10 TABLET ORAL 2 TIMES DAILY
Status: DISCONTINUED | OUTPATIENT
Start: 2021-09-15 | End: 2021-09-17 | Stop reason: HOSPADM

## 2021-09-15 RX ORDER — SODIUM CHLORIDE 0.9 % (FLUSH) 0.9 %
10 SYRINGE (ML) INJECTION PRN
Status: DISCONTINUED | OUTPATIENT
Start: 2021-09-15 | End: 2021-09-17 | Stop reason: HOSPADM

## 2021-09-15 RX ORDER — FLUOXETINE HYDROCHLORIDE 20 MG/1
20 CAPSULE ORAL DAILY
Status: DISCONTINUED | OUTPATIENT
Start: 2021-09-15 | End: 2021-09-17 | Stop reason: HOSPADM

## 2021-09-15 RX ORDER — SODIUM CHLORIDE 0.9 % (FLUSH) 0.9 %
10 SYRINGE (ML) INJECTION EVERY 12 HOURS SCHEDULED
Status: DISCONTINUED | OUTPATIENT
Start: 2021-09-15 | End: 2021-09-17 | Stop reason: HOSPADM

## 2021-09-15 RX ORDER — ACETAMINOPHEN 650 MG/1
650 SUPPOSITORY RECTAL EVERY 6 HOURS PRN
Status: DISCONTINUED | OUTPATIENT
Start: 2021-09-15 | End: 2021-09-17 | Stop reason: HOSPADM

## 2021-09-15 RX ORDER — SODIUM CHLORIDE 9 MG/ML
INJECTION, SOLUTION INTRAVENOUS CONTINUOUS
Status: DISCONTINUED | OUTPATIENT
Start: 2021-09-15 | End: 2021-09-16

## 2021-09-15 RX ORDER — GABAPENTIN 400 MG/1
1200 CAPSULE ORAL DAILY
Status: DISCONTINUED | OUTPATIENT
Start: 2021-09-15 | End: 2021-09-17 | Stop reason: HOSPADM

## 2021-09-15 RX ORDER — BUDESONIDE 0.5 MG/2ML
1 INHALANT ORAL 2 TIMES DAILY
Status: DISCONTINUED | OUTPATIENT
Start: 2021-09-15 | End: 2021-09-17 | Stop reason: HOSPADM

## 2021-09-15 RX ADMIN — BUDESONIDE 1000 MCG: 0.5 INHALANT RESPIRATORY (INHALATION) at 06:31

## 2021-09-15 RX ADMIN — METHYLPREDNISOLONE SODIUM SUCCINATE 60 MG: 125 INJECTION, POWDER, FOR SOLUTION INTRAMUSCULAR; INTRAVENOUS at 09:01

## 2021-09-15 RX ADMIN — CLONAZEPAM 1 MG: 1 TABLET ORAL at 09:01

## 2021-09-15 RX ADMIN — FLUOXETINE 20 MG: 20 CAPSULE ORAL at 09:02

## 2021-09-15 RX ADMIN — GUAIFENESIN 200 MG: 200 SOLUTION ORAL at 09:00

## 2021-09-15 RX ADMIN — ARFORMOTEROL TARTRATE 15 MCG: 15 SOLUTION RESPIRATORY (INHALATION) at 06:32

## 2021-09-15 RX ADMIN — BENZTROPINE MESYLATE 1 MG: 1 TABLET ORAL at 20:31

## 2021-09-15 RX ADMIN — GABAPENTIN 1200 MG: 400 CAPSULE ORAL at 09:02

## 2021-09-15 RX ADMIN — IPRATROPIUM BROMIDE 0.5 MG: 0.5 SOLUTION RESPIRATORY (INHALATION) at 06:31

## 2021-09-15 RX ADMIN — TRAZODONE HYDROCHLORIDE 50 MG: 50 TABLET ORAL at 20:31

## 2021-09-15 RX ADMIN — CLONAZEPAM 1 MG: 1 TABLET ORAL at 20:31

## 2021-09-15 RX ADMIN — BUSPIRONE HYDROCHLORIDE 10 MG: 10 TABLET ORAL at 09:02

## 2021-09-15 RX ADMIN — ARFORMOTEROL TARTRATE 15 MCG: 15 SOLUTION RESPIRATORY (INHALATION) at 18:36

## 2021-09-15 RX ADMIN — BUSPIRONE HYDROCHLORIDE 10 MG: 10 TABLET ORAL at 20:31

## 2021-09-15 RX ADMIN — ENOXAPARIN SODIUM 40 MG: 40 INJECTION SUBCUTANEOUS at 09:01

## 2021-09-15 RX ADMIN — BENZTROPINE MESYLATE 1 MG: 1 TABLET ORAL at 09:02

## 2021-09-15 RX ADMIN — ACETAMINOPHEN 650 MG: 325 TABLET ORAL at 06:55

## 2021-09-15 RX ADMIN — SODIUM CHLORIDE, PRESERVATIVE FREE 10 ML: 5 INJECTION INTRAVENOUS at 09:06

## 2021-09-15 RX ADMIN — IPRATROPIUM BROMIDE 0.5 MG: 0.5 SOLUTION RESPIRATORY (INHALATION) at 18:36

## 2021-09-15 RX ADMIN — BUDESONIDE 1000 MCG: 0.5 INHALANT RESPIRATORY (INHALATION) at 18:36

## 2021-09-15 RX ADMIN — LURASIDONE HYDROCHLORIDE 40 MG: 40 TABLET, FILM COATED ORAL at 09:02

## 2021-09-15 RX ADMIN — SODIUM CHLORIDE: 9 INJECTION, SOLUTION INTRAVENOUS at 06:50

## 2021-09-15 ASSESSMENT — PAIN DESCRIPTION - LOCATION
LOCATION: RIB CAGE;ABDOMEN
LOCATION: NECK
LOCATION: ABDOMEN

## 2021-09-15 ASSESSMENT — PAIN SCALES - GENERAL
PAINLEVEL_OUTOF10: 3
PAINLEVEL_OUTOF10: 7
PAINLEVEL_OUTOF10: 9
PAINLEVEL_OUTOF10: 8

## 2021-09-15 ASSESSMENT — PAIN DESCRIPTION - DESCRIPTORS
DESCRIPTORS: SHARP
DESCRIPTORS: SHARP

## 2021-09-15 ASSESSMENT — PAIN DESCRIPTION - ORIENTATION
ORIENTATION: RIGHT
ORIENTATION: RIGHT

## 2021-09-15 NOTE — ED NOTES
Report to [facility] RN on 4th floor.       Dougie Robledo RN  09/14/21 6925

## 2021-09-15 NOTE — H&P
[address] [place], [place] [address]ist Group   HISTORY AND PHYSICAL EXAM      AUTHOR: Uma Harrison MD PATIENT NAME: Mt Beatty   DATE: September 15, 2021 MRN: [id number], : 1960   Primary Care Physician: Irma Santos APRN - CNP     CHIEF COMPLAINT / REASON FOR ADMISSION:  Shortness of Breath,      HPI:   This is a 63 y.o. female  has a past medical history of Alcoholism ([facility] COPD (chronic obstructive pulmonary disease) ([address] Dental caries, Hypertension, Lung nodules, Schizophrenia ([facility] [address] and Ventricular hypokinesis. presented with SOB for last few days prior to arrival to the hospital. Initially SOB was mild, intermittent but gradually getting more persistent. Started gradually. Exacerbated by general activity or exertion. Relieved only partially by rest.  The patient was seen and examined at bedside, appears alert and awake with no acute distress and is able to answer simple  questions. On direct questioning, patient denied any  resting ongoing chest pain, resting SOB, hemoptysis, productive cough, fever, ongoing palpitation, active abdominal pain, hematemesis, rectal bleeding, winnie, hematuria, any other  and GI complaints and any new focal neuro deficits. ROS:  Pertinent positives and negatives are noted in the HPI, all other systems are reviewed and negative    PMH:  Past Medical History:   Diagnosis Date    Alcoholism ([facility]     H/O    COPD (chronic obstructive pulmonary disease) ([facility] [address] [facility]     Dental caries     Hypertension     no meds    Lung nodules      un[place].     Schizophrenia ([facility] [address] [facility]     stable    Ventricular hypokinesis     H/O       Surgical History:  Past Surgical History:   Procedure Laterality Date    APPENDECTOMY       SECTION      2 C SECTION    ECHO COMPLETE  1/10/2014         NV DENTAL SURGERY PROCEDURE N/A 2018    MULTIPLE DENTAL EXTRACTIONS AND ALVELOPLASTY performed by Rogelio Hernandez DDS at [address] [place] [address]  WRIST SURGERY Right        Medications Prior to Admission:    Prior to Admission medications    Medication Sig Start Date End Date Taking? Authorizing Provider   traZODone (DESYREL) 50 MG tablet Take 50 mg by mouth nightly as needed for Sleep 1-2 tablets   Yes Historical Provider, MD   albuterol sulfate HFA (PROVENTIL HFA) 108 (90 Base) MCG/ACT inhaler Inhale 2 puffs into the lungs every 6 hours as needed for Wheezing 7/5/21  Yes Deysi Hernandez MD   benztropine (COGENTIN) 1 MG tablet Take 1 mg by mouth 2 times daily   Yes Historical Provider, MD   lurasidone (LATUDA) 40 MG TABS tablet Take 40 mg by mouth daily   Yes Historical Provider, MD   FLUoxetine (PROZAC) 20 MG capsule Take 20 mg by mouth daily Instructed to take with sip water am of procedure   Yes Historical Provider, MD   busPIRone (BUSPAR) 10 MG tablet Take 10 mg by mouth 2 times daily Instructed to take with sip water am of procedure   Yes Historical Provider, MD   fluticasone-salmeterol (ADVAIR) 500-50 MCG/DOSE diskus inhaler Inhale 1 puff into the lungs every 12 hours Use am dos   Yes Historical Provider, MD   gabapentin (NEURONTIN) 300 MG capsule Take 1,200 mg by mouth daily. Instructed to take with sip water am of procedure. Yes Historical Provider, MD   tiotropium (SPIRIVA HANDIHALER) 18 MCG inhalation capsule Inhale 18 mcg into the lungs daily Use am dos    Historical Provider, MD   clonazePAM (KLONOPIN) 0.5 MG tablet Take 1 mg by mouth 2 times daily Instructed to take with sip water am of procedure, if needed. [place] Historical Provider, MD       Allergies:    Codeine, Dust mite extract, Aspirin, and Penicillins    Social History:    reports that she has been smoking. She has a 42.00 pack-year smoking history. She has never used smokeless tobacco. She reports current alcohol use. She reports that she does not use drugs. Family History:   family history includes Cancer in her father; Other in her mother.       PHYSICAL EXAM:  Vitals:  BP (!) 117/59   Pulse 82   Temp 98.6 °F (37 °C) (Oral)   Resp 22   Wt 145 lb (65.8 kg)   LMP  (LMP Unknown)   SpO2 99%   BMI 20.22 kg/m²   GENERAL: No acute distress, Alert and awake, Afebrile, Appears tired and weak otherwise hemodynamically stable at present. HEENT: PERRLA, no icterus. OP clear and no exudates. NECK: Supple  no carotid/ophthalmic bruits, JVD None. RESPIRATORY:  Bilateral equal vesicular with prolonged expiration breath sound with diffused bilateral expiratory wheezing. Lung bases are clear. HEART: tachycardia and regular rhythm. Normal S1 and S2, No S3 or S4 is audible. No pulsation, thrills, murmur or friction rubs. ABDOMEN: Soft, nondistended, nontender. No hepatomegaly or splenomegaly. No CVA tenderness on the both sides. Bowel sound is present. EXTREMITIES: All peripheral pulses are present. No calf tenderness or swelling. No pedal edema is present. [facility] NEUROLOGY: Alert and awake. No new focal neuro deficit. Bilateral Pupil is equal and reactive to light. CN-ii-xii otherwise grossly intact. Motor and Sensory: Grossly Intact bilaterally with no new focal signs   LABS:  Recent Labs     09/14/21  1652   WBC 10.6   RBC 4.38   HGB 14.4   HCT 43.1   MCV 98.4   MCH 32.9   MCHC 33.4   RDW 13.6      MPV 11.1     Recent Labs     09/14/21  1652      K 4.4   *   CO2 23   BUN 16   CREATININE 1.5*   GLUCOSE 89   CALCIUM 9.4     No results for input(s): POCGLU in the last 72 hours.   Results for orders placed or performed during the hospital encounter of 09/14/21   COVID-19, Rapid    Specimen: Nasopharyngeal Swab   Result Value Ref Range    SARS-CoV-2, NAAT Not Detected Not Detected   Lactate, Sepsis   Result Value Ref Range    Lactic Acid, Sepsis 2.1 (H) 0.5 - 1.9 mmol/L   Lactate, Sepsis   Result Value Ref Range    Lactic Acid, Sepsis 2.7 (H) 0.5 - 1.9 mmol/L   CBC Auto Differential   Result Value Ref Range    WBC 10.6 4.5 - 11.5 E9/L    RBC 4.38 3.50 - 5.50 E12/L    Hemoglobin 22.0 - 26.0 mmol/L    B.E. -1.0 -3.0 - 3.0 mmol/L    O2 Sat 99.5 (H) 92.0 - 98.5 %     ID 30     DEVICE [id number]     Delivery Systems Cannula    EKG 12 Lead   Result Value Ref Range    Ventricular Rate 103 BPM    Atrial Rate 69 BPM    QRS Duration 86 ms    Q-T Interval 348 ms    QTc Calculation (Bazett) 455 ms    R Axis 69 degrees    T Axis 72 degrees     ED Course as of Sep 15 0613   Tue Sep 14, 2021   [address] Patient sitting up in the bed, overall significant improvement with no particular distress at this time although still has some tachypnea. Lung sounds are significant improved, still has wheezing but has significant proved aeration bilaterally. Still having intermittent coughing spells. Work-up results to this point are discussed. [NC]   4691 Case discussed with Dr. Philippe Velez, detailed overview given, he will admit the patient. [NC]      ED Course User Index  [NC] Cassie Benavides DO     Radiology: XR CHEST PORTABLE    Result Date: 9/14/2021  EXAMINATION: ONE XRAY VIEW OF THE CHEST 9/14/2021 4:59 pm COMPARISON: None. HISTORY: ORDERING SYSTEM PROVIDED HISTORY: cough, SOB TECHNOLOGIST PROVIDED HISTORY: Reason for exam:->cough, SOB FINDINGS: The lungs are without acute focal process. There is no effusion or pneumothorax. The cardiomediastinal silhouette is without acute process. The osseous structures are without acute process. No acute pulmonary process     ASSESSMENT:    Present on Admission:   Acute exacerbation of chronic obstructive airways disease (HCC)   COPD exacerbation (HCC)    PLAN:  # Acute exacerbation of COPD   COVID -neg   CXR - no pneumonia   Patient has Persistent Wheezing   EKG: NSR with no specific ST changes  Started on Solumedrol IV  Humidified Oxygen @2-6 L/min PRN  Monitor Vitals /Telemetry   # WILLIE - secondary to dehydration            Continue IV fluid and encourge PO fluid              Monitor I/O, BUN/Cr accordingly.               Avoid Nephrotoxic medications, ACE-i and NSAIDS. US-Renal/Renal consult if fails to improve promptly  # Mood Disorder / Psych disorder   Currently stable with no acute changes   Will resume/adjust medications as on chart  # Rest of the chronic medical problems are stable and will be managed with appropriately with home medications, placed nursing communication order to verify home medications before giving them to the patient. # Diet: On PO Diet  # IVF's: No  # Fall Precaution: Yes  # Disposition: Home/primary residence   # Code Status: Full code by default  # DVT Prophylaxis : SC Heparin or SC Lovenox as on chart  The patient at bedside was counseled about clinical status, laboratory/imaging results, diagnoses, medication side effects, risk, and treatment plan, all questions were answered to patient's satisfaction and verbalized understanding      SIGNATURE: Ronnell Tovar MD PATIENT NAME: Ying Richards   CONTACT #: Hospitalist on call MRN: [id number]     Disclaimer: Portions of this note may have been generated using Dragon voice recognition software. Reasonable efforts were made to correct any dictation errors that resulted due to the programming of this software but some may still be present.

## 2021-09-15 NOTE — CARE COORDINATION
9/15/2021 1103 CM note: Negative covid 9/14/21. Met with patient for transition of care needs. Pt resides alone in a 1 floor efficiency apartment,5 step entry. She is somewhat independent and owns a cane and walker, but does not use. PCP is Dr Alejandra Win and obtains meds from [address] [place] [address]. Pt follows with Carlos Vidales and he and a neighbor assist with shopping and transportation. Hx of [facility] and [facility]. Pt wearing [email or web address] [place] and does NOT have home o2. If home o2 is needed, pt prefers [employer]. Pt may benefit from  PT/OT eval to assist with transition of care needs. CM/SW to follow for possible o2 needs.  Emily MATHEWS

## 2021-09-15 NOTE — PROGRESS NOTES
[facility] [place] [address] Hospitalist   Progress Note    Admitting Date and Time: 9/14/2021  4:40 PM  Admit Dx: Respiratory distress [R06.03]  COPD exacerbation (HCC) [J44.1]  Acute exacerbation of chronic obstructive airways disease (HCC) [J44.1]    Subjective:    Pt seen after walking out the bathroom. Patient was very dyspneic and coughing. Patient remains on oxygen 4L [place]. Discussed with patient regarding quitting smoking and is agreeable to try Nicorette gum. Patient states that she has been short of breath for a while. ROS: denies fever, chills, cp, sob, n/v, HA unless stated above.      clonazePAM  1 mg Oral BID    gabapentin  1,200 mg Oral Daily    FLUoxetine  20 mg Oral Daily    busPIRone  10 mg Oral BID    benztropine  1 mg Oral BID    sodium chloride flush  10 mL IntraVENous 2 times per day    enoxaparin  40 mg SubCUTAneous Daily    ipratropium  0.5 mg Nebulization 4x daily    Arformoterol Tartrate  15 mcg Nebulization BID    And    budesonide  1 mg Nebulization BID    lurasidone  40 mg Oral Daily    methylPREDNISolone  60 mg IntraVENous Daily     traZODone, 50 mg, Nightly PRN  sodium chloride flush, 10 mL, PRN  sodium chloride, 25 mL, PRN  promethazine, 12.5 mg, Q6H PRN   Or  ondansetron, 4 mg, Q6H PRN  polyethylene glycol, 17 g, Daily PRN  acetaminophen, 650 mg, Q6H PRN   Or  acetaminophen, 650 mg, Q6H PRN  albuterol, 2.5 mg, Q6H PRN  nicotine polacrilex, 4 mg, PRN  ipratropium-albuterol, 1 ampule, Q4H PRN  guaiFENesin, 200 mg, Q6H PRN         Objective:    /80   Pulse 93   Temp 98.2 °F (36.8 °C) (Oral)   Resp 21   Ht 5' 11\" (1.803 m)   Wt 145 lb (65.8 kg)   LMP  (LMP Unknown)   SpO2 100%   BMI 20.22 kg/m²   General Appearance: alert and oriented to person, place and time and in no acute distress, thin and frail   Skin: warm and dry  Head: normocephalic and atraumatic  Eyes: pupils equal, round, and reactive to light, conjunctivae normal  Neck: neck supple and non tender without mass   Pulmonary/Chest: diminished with scattered rhonchi and harsh cough, O2 4L [place] intact, normal air movement, no respiratory distress  Cardiovascular: normal rate, normal S1 and S2 and no carotid bruits  Abdomen: soft, non-tender, non-distended, normal bowel sounds   Extremities: no cyanosis, no clubbing and no edema  Neurologic: no cranial nerve deficit and speech normal      Recent Labs     09/14/21  1652      K 4.4   *   CO2 23   BUN 16   CREATININE 1.5*   GLUCOSE 89   CALCIUM 9.4       Recent Labs     09/14/21  1652   ALKPHOS 114*   PROT 6.8   LABALBU 3.9   BILITOT 0.8   AST 20   ALT 12       Recent Labs     09/14/21  1652   WBC 10.6   RBC 4.38   HGB 14.4   HCT 43.1   MCV 98.4   MCH 32.9   MCHC 33.4   RDW 13.6      MPV 11.1          Radiology:   XR CHEST PORTABLE   Final Result   No acute pulmonary process             Assessment:  Active Problems:    COPD exacerbation (HCC)    Acute exacerbation of chronic obstructive airways disease (HCC)  Resolved Problems:    * No resolved hospital problems. *      Plan:  1. Acute respiratory failure with hypoxia in the setting acute exacerbation of COPD - oxygen 4 L nasal cannula intact - continue Brovana and Pulmicort - IV Solu-Medrol continued - encourage incentive spirometer    2. Acute kidney injury - creatinine remains elevated - IV fluids continued - continue to monitor renal status    3. Depression - controlled with Prozac, Latuda Cogentin and BuSpar    4. Schizoaffective disorder - continue current treatment      5. Tobacco abuse - smokes 1 1/2 pack a day - 3 minutes spent on counseling patient regarding smoking cessation - at one point patient appeared very interested in quitting and then prior to ending conversation patient said she will try to quit - Nicorette gum ordered    NOTE: This report was transcribed using voice recognition software.  Every effort was made to ensure accuracy; however, inadvertent computerized transcription

## 2021-09-16 ENCOUNTER — APPOINTMENT (OUTPATIENT)
Dept: GENERAL RADIOLOGY | Age: 61
DRG: 140 | End: 2021-09-16
Payer: COMMERCIAL

## 2021-09-16 LAB
ALBUMIN SERPL-MCNC: 3.5 G/DL (ref 3.5–5.2)
ALP BLD-CCNC: 83 U/L (ref 35–104)
ALT SERPL-CCNC: 10 U/L (ref 0–32)
ANION GAP SERPL CALCULATED.3IONS-SCNC: 13 MMOL/L (ref 7–16)
AST SERPL-CCNC: 24 U/L (ref 0–31)
BASOPHILS ABSOLUTE: 0.04 E9/L (ref 0–0.2)
BASOPHILS RELATIVE PERCENT: 0.4 % (ref 0–2)
BILIRUB SERPL-MCNC: 0.3 MG/DL (ref 0–1.2)
BUN BLDV-MCNC: 15 MG/DL (ref 6–20)
CALCIUM SERPL-MCNC: 8.6 MG/DL (ref 8.6–10.2)
CHLORIDE BLD-SCNC: 114 MMOL/L (ref 98–107)
CO2: 17 MMOL/L (ref 22–29)
CREAT SERPL-MCNC: 1.1 MG/DL (ref 0.5–1)
EOSINOPHILS ABSOLUTE: 0.03 E9/L (ref 0.05–0.5)
EOSINOPHILS RELATIVE PERCENT: 0.3 % (ref 0–6)
GFR AFRICAN AMERICAN: >60
GFR NON-AFRICAN AMERICAN: 51 ML/MIN/1.73
GLUCOSE BLD-MCNC: 76 MG/DL (ref 74–99)
HCT VFR BLD CALC: 34.6 % (ref 34–48)
HEMOGLOBIN: 11.2 G/DL (ref 11.5–15.5)
IMMATURE GRANULOCYTES #: 0.03 E9/L
IMMATURE GRANULOCYTES %: 0.3 % (ref 0–5)
LYMPHOCYTES ABSOLUTE: 1.29 E9/L (ref 1.5–4)
LYMPHOCYTES RELATIVE PERCENT: 13.7 % (ref 20–42)
MCH RBC QN AUTO: 33 PG (ref 26–35)
MCHC RBC AUTO-ENTMCNC: 32.4 % (ref 32–34.5)
MCV RBC AUTO: 102.1 FL (ref 80–99.9)
MONOCYTES ABSOLUTE: 0.59 E9/L (ref 0.1–0.95)
MONOCYTES RELATIVE PERCENT: 6.3 % (ref 2–12)
NEUTROPHILS ABSOLUTE: 7.46 E9/L (ref 1.8–7.3)
NEUTROPHILS RELATIVE PERCENT: 79 % (ref 43–80)
PDW BLD-RTO: 13.9 FL (ref 11.5–15)
PLATELET # BLD: 213 E9/L (ref 130–450)
PMV BLD AUTO: 11.7 FL (ref 7–12)
POTASSIUM REFLEX MAGNESIUM: 5.3 MMOL/L (ref 3.5–5)
RBC # BLD: 3.39 E12/L (ref 3.5–5.5)
REASON FOR REJECTION: NORMAL
REJECTED TEST: NORMAL
SODIUM BLD-SCNC: 144 MMOL/L (ref 132–146)
TOTAL PROTEIN: 5.8 G/DL (ref 6.4–8.3)
WBC # BLD: 9.4 E9/L (ref 4.5–11.5)

## 2021-09-16 PROCEDURE — 96376 TX/PRO/DX INJ SAME DRUG ADON: CPT

## 2021-09-16 PROCEDURE — 6370000000 HC RX 637 (ALT 250 FOR IP): Performed by: INTERNAL MEDICINE

## 2021-09-16 PROCEDURE — 71100 X-RAY EXAM RIBS UNI 2 VIEWS: CPT

## 2021-09-16 PROCEDURE — 94640 AIRWAY INHALATION TREATMENT: CPT

## 2021-09-16 PROCEDURE — 85025 COMPLETE CBC W/AUTO DIFF WBC: CPT

## 2021-09-16 PROCEDURE — 6360000002 HC RX W HCPCS: Performed by: INTERNAL MEDICINE

## 2021-09-16 PROCEDURE — 2500000003 HC RX 250 WO HCPCS: Performed by: INTERNAL MEDICINE

## 2021-09-16 PROCEDURE — 2580000003 HC RX 258: Performed by: INTERNAL MEDICINE

## 2021-09-16 PROCEDURE — 99232 SBSQ HOSP IP/OBS MODERATE 35: CPT | Performed by: INTERNAL MEDICINE

## 2021-09-16 PROCEDURE — 2700000000 HC OXYGEN THERAPY PER DAY

## 2021-09-16 PROCEDURE — 36415 COLL VENOUS BLD VENIPUNCTURE: CPT

## 2021-09-16 PROCEDURE — 96375 TX/PRO/DX INJ NEW DRUG ADDON: CPT

## 2021-09-16 PROCEDURE — G0378 HOSPITAL OBSERVATION PER HR: HCPCS

## 2021-09-16 PROCEDURE — 80053 COMPREHEN METABOLIC PANEL: CPT

## 2021-09-16 PROCEDURE — APPSS30 APP SPLIT SHARED TIME 16-30 MINUTES: Performed by: NURSE PRACTITIONER

## 2021-09-16 PROCEDURE — 96372 THER/PROPH/DIAG INJ SC/IM: CPT

## 2021-09-16 RX ORDER — PREDNISONE 20 MG/1
40 TABLET ORAL DAILY
Status: DISCONTINUED | OUTPATIENT
Start: 2021-09-16 | End: 2021-09-17 | Stop reason: HOSPADM

## 2021-09-16 RX ADMIN — GABAPENTIN 1200 MG: 400 CAPSULE ORAL at 08:23

## 2021-09-16 RX ADMIN — Medication: at 18:29

## 2021-09-16 RX ADMIN — GUAIFENESIN 200 MG: 200 SOLUTION ORAL at 05:19

## 2021-09-16 RX ADMIN — IPRATROPIUM BROMIDE 0.5 MG: 0.5 SOLUTION RESPIRATORY (INHALATION) at 18:10

## 2021-09-16 RX ADMIN — LURASIDONE HYDROCHLORIDE 40 MG: 40 TABLET, FILM COATED ORAL at 08:22

## 2021-09-16 RX ADMIN — PREDNISONE 40 MG: 20 TABLET ORAL at 13:27

## 2021-09-16 RX ADMIN — BUDESONIDE 1000 MCG: 0.5 INHALANT RESPIRATORY (INHALATION) at 18:10

## 2021-09-16 RX ADMIN — ARFORMOTEROL TARTRATE 15 MCG: 15 SOLUTION RESPIRATORY (INHALATION) at 18:09

## 2021-09-16 RX ADMIN — BUSPIRONE HYDROCHLORIDE 10 MG: 10 TABLET ORAL at 08:23

## 2021-09-16 RX ADMIN — BENZTROPINE MESYLATE 1 MG: 1 TABLET ORAL at 08:23

## 2021-09-16 RX ADMIN — IPRATROPIUM BROMIDE 0.5 MG: 0.5 SOLUTION RESPIRATORY (INHALATION) at 10:50

## 2021-09-16 RX ADMIN — BUDESONIDE 1000 MCG: 0.5 INHALANT RESPIRATORY (INHALATION) at 06:38

## 2021-09-16 RX ADMIN — IPRATROPIUM BROMIDE 0.5 MG: 0.5 SOLUTION RESPIRATORY (INHALATION) at 14:14

## 2021-09-16 RX ADMIN — CLONAZEPAM 1 MG: 1 TABLET ORAL at 20:20

## 2021-09-16 RX ADMIN — BUSPIRONE HYDROCHLORIDE 10 MG: 10 TABLET ORAL at 20:20

## 2021-09-16 RX ADMIN — TRAZODONE HYDROCHLORIDE 50 MG: 50 TABLET ORAL at 20:20

## 2021-09-16 RX ADMIN — ARFORMOTEROL TARTRATE 15 MCG: 15 SOLUTION RESPIRATORY (INHALATION) at 06:38

## 2021-09-16 RX ADMIN — CLONAZEPAM 1 MG: 1 TABLET ORAL at 08:22

## 2021-09-16 RX ADMIN — GUAIFENESIN 200 MG: 200 SOLUTION ORAL at 20:20

## 2021-09-16 RX ADMIN — SODIUM CHLORIDE, PRESERVATIVE FREE 10 ML: 5 INJECTION INTRAVENOUS at 08:23

## 2021-09-16 RX ADMIN — METHYLPREDNISOLONE SODIUM SUCCINATE 60 MG: 125 INJECTION, POWDER, FOR SOLUTION INTRAMUSCULAR; INTRAVENOUS at 08:23

## 2021-09-16 RX ADMIN — BENZTROPINE MESYLATE 1 MG: 1 TABLET ORAL at 20:20

## 2021-09-16 RX ADMIN — IPRATROPIUM BROMIDE 0.5 MG: 0.5 SOLUTION RESPIRATORY (INHALATION) at 06:38

## 2021-09-16 RX ADMIN — FLUOXETINE 20 MG: 20 CAPSULE ORAL at 08:22

## 2021-09-16 RX ADMIN — ENOXAPARIN SODIUM 40 MG: 40 INJECTION SUBCUTANEOUS at 08:22

## 2021-09-16 ASSESSMENT — PAIN SCALES - GENERAL: PAINLEVEL_OUTOF10: 0

## 2021-09-16 NOTE — PROGRESS NOTES
[address] [place], [place] [address]ist   Progress Note    Admitting Date and Time: 9/14/2021  4:40 PM  Admit Dx: Respiratory distress [R06.03]  COPD exacerbation (HCC) [J44.1]  Acute exacerbation of chronic obstructive airways disease (HCC) [J44.1]    Subjective:    Pt states that she \"feels terrible\" and she has been coughing and her chest is aching. Patient also complained of not being able to get much sleep because of the coughing. ROS: denies fever, chills, cp, sob, n/v, HA unless stated above.      clonazePAM  1 mg Oral BID    gabapentin  1,200 mg Oral Daily    FLUoxetine  20 mg Oral Daily    busPIRone  10 mg Oral BID    benztropine  1 mg Oral BID    sodium chloride flush  10 mL IntraVENous 2 times per day    enoxaparin  40 mg SubCUTAneous Daily    ipratropium  0.5 mg Nebulization 4x daily    Arformoterol Tartrate  15 mcg Nebulization BID    And    budesonide  1 mg Nebulization BID    lurasidone  40 mg Oral Daily    methylPREDNISolone  60 mg IntraVENous Daily     traZODone, 50 mg, Nightly PRN  sodium chloride flush, 10 mL, PRN  sodium chloride, 25 mL, PRN  promethazine, 12.5 mg, Q6H PRN   Or  ondansetron, 4 mg, Q6H PRN  polyethylene glycol, 17 g, Daily PRN  acetaminophen, 650 mg, Q6H PRN   Or  acetaminophen, 650 mg, Q6H PRN  nicotine polacrilex, 4 mg, PRN  ipratropium-albuterol, 1 ampule, Q4H PRN  guaiFENesin, 200 mg, Q6H PRN         Objective:    /72   Pulse 85   Temp 97.4 °F (36.3 °C) (Oral)   Resp 18   Ht 5' 11\" (1.803 m)   Wt 145 lb (65.8 kg)   LMP  (LMP Unknown)   SpO2 100%   BMI 20.22 kg/m²   General Appearance: alert and oriented to person, place and time and in no acute distress  Skin: warm and dry  Head: normocephalic and atraumatic  Eyes: pupils equal, round, and reactive to light, conjunctivae normal  Neck: neck supple and non tender without mass   Pulmonary/Chest: clear to auscultation bilaterally- diminished in the middle lobes and bases, no wheezes, rales or rhonchi, no respiratory distress, oxygen removed, O2 on RA 99%  Cardiovascular: normal rate, normal rhythm, and no carotid bruits  Abdomen: soft, tender abdomen in right lower quadrant, non-distended, normal bowel sounds  Extremities: no cyanosis, no clubbing and no edema  Neurologic: no cranial nerve deficit and speech normal      Recent Labs     09/14/21  1652 09/16/21  0512    144   K 4.4 5.3*   * 114*   CO2 23 17*   BUN 16 15   CREATININE 1.5* 1.1*   GLUCOSE 89 76   CALCIUM 9.4 8.6       Recent Labs     09/14/21  1652 09/16/21  0512   ALKPHOS 114* 83   PROT 6.8 5.8*   LABALBU 3.9 3.5   BILITOT 0.8 0.3   AST 20 24   ALT 12 10       Recent Labs     09/14/21  1652   WBC 10.6   RBC 4.38   HGB 14.4   HCT 43.1   MCV 98.4   MCH 32.9   MCHC 33.4   RDW 13.6      MPV 11.1         Radiology:   XR CHEST PORTABLE   Final Result   No acute pulmonary process             Assessment:  Principal Problem:    Acute exacerbation of chronic obstructive airways disease (HCC)  Active Problems:    Acute respiratory failure with hypoxia and hypercapnia (HCC)    COPD exacerbation (HCC)    Depression    Respiratory distress    WILLIE (acute kidney injury) ([facility]    Schizophrenia ([facility] [facility]  Resolved Problems:    * No resolved hospital problems. *      Plan:  1. Acute respiratory failure with hypoxia in the setting acute exacerbation of COPD - oxygen discontinued - O2 sat on RA 99% - teaching regarding the importance of incentive spirometer reviewed - continue Brovana and Pulmicort      2. Acute kidney injury - creatinine remains slightly elevated but improving - continue IV fluids - encourage PO fluids - repeat BMP in AM     3. Depression - controlled with current treatment regimen     4. Schizoaffective disorder - continue Latuda - encourage pt to continue with counseling appointments at [place] [address]  5.  Tobacco abuse - smokes 1 1/2 packs per day - again counseled patient regarding smoking cessation - continue Nicorette gum    Disposition - Discharge tomorrow to home if stable    NOTE: This report was transcribed using voice recognition software. Every effort was made to ensure accuracy; however, inadvertent computerized transcription errors may be present.      Electronically signed by CHICHO Shaffer CNP on 9/16/2021 at 9:03 AM

## 2021-09-16 NOTE — CARE COORDINATION
9/16/2021 1112 CM note: Negative covid 9/14/21. Pt resides alone in a 1 floor efficiency apartment,5 step entry. She is somewhat independent. Pt follows with Carlos Vidales and he and a neighbor assist with shopping and transportation. Pt now on room air and per nursing ambulating well in room. Plan is for pt to return home at [place].  Emily MATHEWS

## 2021-09-17 VITALS
OXYGEN SATURATION: 99 % | BODY MASS INDEX: 20.3 KG/M2 | TEMPERATURE: 97.7 F | HEART RATE: 83 BPM | SYSTOLIC BLOOD PRESSURE: 129 MMHG | WEIGHT: 145 LBS | HEIGHT: 71 IN | DIASTOLIC BLOOD PRESSURE: 93 MMHG | RESPIRATION RATE: 18 BRPM

## 2021-09-17 LAB
ANION GAP SERPL CALCULATED.3IONS-SCNC: 3 MMOL/L (ref 7–16)
BUN BLDV-MCNC: 17 MG/DL (ref 6–20)
CALCIUM SERPL-MCNC: 8.7 MG/DL (ref 8.6–10.2)
CHLORIDE BLD-SCNC: 108 MMOL/L (ref 98–107)
CO2: 29 MMOL/L (ref 22–29)
CREAT SERPL-MCNC: 1 MG/DL (ref 0.5–1)
GFR AFRICAN AMERICAN: >60
GFR NON-AFRICAN AMERICAN: 57 ML/MIN/1.73
GLUCOSE BLD-MCNC: 80 MG/DL (ref 74–99)
HCT VFR BLD CALC: 35.6 % (ref 34–48)
HEMOGLOBIN: 11.6 G/DL (ref 11.5–15.5)
MCH RBC QN AUTO: 33 PG (ref 26–35)
MCHC RBC AUTO-ENTMCNC: 32.6 % (ref 32–34.5)
MCV RBC AUTO: 101.4 FL (ref 80–99.9)
PDW BLD-RTO: 14.1 FL (ref 11.5–15)
PLATELET # BLD: 226 E9/L (ref 130–450)
PMV BLD AUTO: 11.4 FL (ref 7–12)
POTASSIUM SERPL-SCNC: 4.3 MMOL/L (ref 3.5–5)
RBC # BLD: 3.51 E12/L (ref 3.5–5.5)
SODIUM BLD-SCNC: 140 MMOL/L (ref 132–146)
WBC # BLD: 10.7 E9/L (ref 4.5–11.5)

## 2021-09-17 PROCEDURE — 6360000002 HC RX W HCPCS: Performed by: INTERNAL MEDICINE

## 2021-09-17 PROCEDURE — 99239 HOSP IP/OBS DSCHRG MGMT >30: CPT | Performed by: INTERNAL MEDICINE

## 2021-09-17 PROCEDURE — 1200000000 HC SEMI PRIVATE

## 2021-09-17 PROCEDURE — APPSS45 APP SPLIT SHARED TIME 31-45 MINUTES: Performed by: NURSE PRACTITIONER

## 2021-09-17 PROCEDURE — 36415 COLL VENOUS BLD VENIPUNCTURE: CPT

## 2021-09-17 PROCEDURE — G0378 HOSPITAL OBSERVATION PER HR: HCPCS

## 2021-09-17 PROCEDURE — 94640 AIRWAY INHALATION TREATMENT: CPT

## 2021-09-17 PROCEDURE — 85027 COMPLETE CBC AUTOMATED: CPT

## 2021-09-17 PROCEDURE — 6370000000 HC RX 637 (ALT 250 FOR IP): Performed by: INTERNAL MEDICINE

## 2021-09-17 PROCEDURE — 80048 BASIC METABOLIC PNL TOTAL CA: CPT

## 2021-09-17 RX ORDER — PREDNISONE 20 MG/1
40 TABLET ORAL DAILY
Qty: 10 TABLET | Refills: 0 | Status: SHIPPED | OUTPATIENT
Start: 2021-09-18 | End: 2021-09-23

## 2021-09-17 RX ADMIN — CLONAZEPAM 1 MG: 1 TABLET ORAL at 08:58

## 2021-09-17 RX ADMIN — FLUOXETINE 20 MG: 20 CAPSULE ORAL at 08:58

## 2021-09-17 RX ADMIN — BENZTROPINE MESYLATE 1 MG: 1 TABLET ORAL at 08:58

## 2021-09-17 RX ADMIN — LURASIDONE HYDROCHLORIDE 40 MG: 40 TABLET, FILM COATED ORAL at 08:58

## 2021-09-17 RX ADMIN — IPRATROPIUM BROMIDE 0.5 MG: 0.5 SOLUTION RESPIRATORY (INHALATION) at 14:28

## 2021-09-17 RX ADMIN — GUAIFENESIN 200 MG: 200 SOLUTION ORAL at 09:02

## 2021-09-17 RX ADMIN — PREDNISONE 40 MG: 20 TABLET ORAL at 08:58

## 2021-09-17 RX ADMIN — GABAPENTIN 1200 MG: 400 CAPSULE ORAL at 08:57

## 2021-09-17 RX ADMIN — BUSPIRONE HYDROCHLORIDE 10 MG: 10 TABLET ORAL at 08:58

## 2021-09-17 RX ADMIN — ENOXAPARIN SODIUM 40 MG: 40 INJECTION SUBCUTANEOUS at 08:57

## 2021-09-17 ASSESSMENT — PAIN SCALES - GENERAL: PAINLEVEL_OUTOF10: 0

## 2021-09-17 NOTE — PROGRESS NOTES
CLINICAL PHARMACY NOTE: MEDS TO BEDS    Total # of Prescriptions Filled: 1   The following medications were delivered to the patient:  · PREDNISONE 20 MG     Additional Documentation:

## 2021-09-17 NOTE — DISCHARGE SUMMARY
[facility] Physician Discharge Summary       Macho Medel, APRN - CNP  [address] [place] [address]  [phone]    Schedule an appointment as soon as possible for a visit in 1 week  Call to schedule post hospital follow-up appointment      Activity level: As Tolerated     Diet: No diet orders on file    Dispo:Home     Condition at discharge: Stable         Patient ID:  Jane Dumont  [id number]  63 y.o.  1960    Admit date: 9/14/2021    Discharge date and time:  9/18/2021  7:51 AM    Admission Diagnoses: Principal Problem:    Acute exacerbation of chronic obstructive airways disease ([address]  Active Problems:    Acute respiratory failure with hypoxia and hypercapnia (HCC)    COPD exacerbation (HCC)    Depression    Respiratory distress    WILLIE (acute kidney injury) ([facility]    Schizophrenia ([facility]    Normal anion gap metabolic acidosis  Resolved Problems:    * No resolved hospital problems. *      Discharge Diagnoses: Principal Problem:    Acute exacerbation of chronic obstructive airways disease (HCC)  Active Problems:    Acute respiratory failure with hypoxia and hypercapnia (HCC)    COPD exacerbation (HCC)    Depression    Respiratory distress    WILLIE (acute kidney injury) ([address]    Schizophrenia (HCC)    Normal anion gap metabolic acidosis  Resolved Problems:    * No resolved hospital problems. *      Consults:  IP CONSULT TO INTERNAL MEDICINE    Procedures: None    Hospital Course: Patient was admitted with Respiratory distress [R06.03]  COPD exacerbation ([address] [J44.1]  Acute exacerbation of chronic obstructive airways disease ([facility] [J44.1]. Patient is a 56-year-old male who presented to the ED with complaints shortness of breath, wheezing and worsening cough. Patient was admitted with during patient's hospital stay patient received IV fluids, steroids and breathing treatment. Patient after several days of treatment breathing has improved.  Patient was weaned off supplemental oxygen. Patient is satting 99% on room air with acute respiratory distress. Patient has some psych issue for which she follows with [facility]. Prior to discharge patient's  Kyle Burt has been notified of discharge. Patient has remained stable and afebrile, therefore she will be discharged home with the following medications and instruction. Discharge Exam:  Vitals:    09/16/21 1832 09/16/21 1915 09/16/21 2143 09/17/21 0800   BP:  117/61  (!) 129/93   Pulse:  83 90 83   Resp:  20  18   Temp:  98.2 °F (36.8 °C)  97.7 °F (36.5 °C)   TempSrc:  Oral  Oral   SpO2: 98% 97%  99%   Weight:       Height:           General Appearance: alert and oriented to person, place and time, well-developed and well-nourished, in no acute distress  Skin: warm and dry  Head: normocephalic and atraumatic  Eyes: pupils equal, round, and reactive to light and conjunctivae normal  ENT: hearing grossly normal bilaterally  Neck: neck supple and non tender without mass   Pulmonary/Chest: decreased breath sounds noted- but CTA bilaterally   Cardiovascular: normal rate, regular rhythm and intact distal pulses  Abdomen: soft, non-tender, non-distended, normal bowel sounds, no masses or organomegaly  Extremities: no cyanosis, no clubbing and no edema  Musculoskeletal: no swollen joints, no joint deformity and no tender joints  Neurologic: no cranial nerve deficit, gait and coordination normal and speech normal    I/O last 3 completed shifts:   In: 1760 [P.O.:1760]  Out: -   I/O this shift:  In: 125 [P.O.:125]  Out: -       LABS:  Recent Labs     09/16/21  0512 09/17/21  0705    140   K 5.3* 4.3   * 108*   CO2 17* 29   BUN 15 17   CREATININE 1.1* 1.0   GLUCOSE 76 80   CALCIUM 8.6 8.7       Recent Labs     09/16/21  0921 09/17/21  0705   WBC 9.4 10.7   RBC 3.39* 3.51   HGB 11.2* 11.6   HCT 34.6 35.6   .1* 101.4*   MCH 33.0 33.0   MCHC 32.4 32.6   RDW 13.9 14.1    226   MPV 11.7 11.4       No results for input(s): POCGLU in the last 72 hours. Imaging:   XR RIBS RIGHT (2 VIEWS)   Final Result   No evidence of acute fracture of the ribs. No focal rib abnormality. No evidence of acute focal process in the lungs. No evidence of   consolidation or pulmonary edema. No pleural effusion or pneumothorax. Cardiomediastinal silhouette demonstrates no acute abnormality. IMPRESSION:   No acute abnormality of the ribs. No acute process in the lungs.          XR CHEST PORTABLE   Final Result   No acute pulmonary process             Patient Instructions:      Medication List      START taking these medications    predniSONE 20 MG tablet  Commonly known as: DELTASONE  Take 2 tablets by mouth daily for 5 days        CONTINUE taking these medications    albuterol sulfate  (90 Base) MCG/ACT inhaler  Commonly known as: Proventil HFA  Inhale 2 puffs into the lungs every 6 hours as needed for Wheezing     benztropine 1 MG tablet  Commonly known as: COGENTIN     busPIRone 10 MG tablet  Commonly known as: BUSPAR     clonazePAM 0.5 MG tablet  Commonly known as: KLONOPIN     FLUoxetine 20 MG capsule  Commonly known as: PROZAC     fluticasone-salmeterol 500-50 MCG/DOSE diskus inhaler  Commonly known as: ADVAIR     gabapentin 300 MG capsule  Commonly known as: NEURONTIN     Latuda 40 MG Tabs tablet  Generic drug: lurasidone     Spiriva HandiHaler 18 MCG inhalation capsule  Generic drug: tiotropium     traZODone 50 MG tablet  Commonly known as: DESYREL           Where to Get Your Medications      These medications were sent to [address] [place] [address] [address] [phone]  [address] [place] [address]    Phone: [phone]   · predniSONE 20 MG tablet         Note that more than 30 minutes was spent in preparing discharge papers, discussing discharge with patient, medication review, etc.    Signed:  Electronically signed by CHICHO Dobbins CNP on 9/18/2021 at 7:51 AM    NOTE: This report was transcribed using voice recognition software. Every effort was made to ensure accuracy; however, inadvertent computerized transcription errors may be present.

## 2021-09-17 NOTE — CARE COORDINATION
9/17/2021 1100 CM note: Negative covid 9/14/21. Pt resides alone in a 1 floor efficiency apartment,5 step entry. Pt follows with [address] and he and a neighbor assist with shopping and transportation. Per pt's request,CM left vm message for [facility] [employer] that pt being discharged home today.  Plan is for pt to return home,pt's dtr to provide ride today bewtween 4-4:30pm. Emily MATHEWS

## 2021-09-20 LAB
BLOOD CULTURE, ROUTINE: NORMAL
CULTURE, BLOOD 2: NORMAL

## 2021-11-01 ENCOUNTER — HOSPITAL ENCOUNTER (INPATIENT)
Age: 61
LOS: 10 days | Discharge: OTHER INSTITUTION | DRG: 137 | End: 2021-11-11
Attending: EMERGENCY MEDICINE | Admitting: INTERNAL MEDICINE
Payer: COMMERCIAL

## 2021-11-01 ENCOUNTER — APPOINTMENT (OUTPATIENT)
Dept: GENERAL RADIOLOGY | Age: 61
DRG: 137 | End: 2021-11-01
Payer: COMMERCIAL

## 2021-11-01 DIAGNOSIS — F20.3 UNDIFFERENTIATED SCHIZOPHRENIA (HCC): ICD-10-CM

## 2021-11-01 DIAGNOSIS — N17.9 AKI (ACUTE KIDNEY INJURY) (HCC): ICD-10-CM

## 2021-11-01 DIAGNOSIS — J96.01 ACUTE RESPIRATORY FAILURE WITH HYPOXIA (HCC): Primary | ICD-10-CM

## 2021-11-01 DIAGNOSIS — U07.1 COVID: ICD-10-CM

## 2021-11-01 LAB
ALBUMIN SERPL-MCNC: 3.8 G/DL (ref 3.5–5.2)
ALP BLD-CCNC: 95 U/L (ref 35–104)
ALT SERPL-CCNC: 11 U/L (ref 0–32)
ANION GAP SERPL CALCULATED.3IONS-SCNC: 12 MMOL/L (ref 7–16)
AST SERPL-CCNC: 22 U/L (ref 0–31)
B.E.: -2.1 MMOL/L (ref -3–3)
BACTERIA: ABNORMAL /HPF
BASOPHILS ABSOLUTE: 0 E9/L (ref 0–0.2)
BASOPHILS RELATIVE PERCENT: 0.2 % (ref 0–2)
BILIRUB SERPL-MCNC: 0.3 MG/DL (ref 0–1.2)
BILIRUBIN URINE: NEGATIVE
BLOOD, URINE: NEGATIVE
BUN BLDV-MCNC: 23 MG/DL (ref 6–20)
BURR CELLS: ABNORMAL
C-REACTIVE PROTEIN: 8.9 MG/DL (ref 0–0.4)
CALCIUM SERPL-MCNC: 8.7 MG/DL (ref 8.6–10.2)
CHLORIDE BLD-SCNC: 94 MMOL/L (ref 98–107)
CLARITY: CLEAR
CO2: 25 MMOL/L (ref 22–29)
COLOR: YELLOW
CREAT SERPL-MCNC: 1.2 MG/DL (ref 0.5–1)
D DIMER: 402 NG/ML DDU
DELIVERY SYSTEMS: ABNORMAL
DEVICE: ABNORMAL
EKG ATRIAL RATE: 107 BPM
EKG Q-T INTERVAL: 354 MS
EKG QRS DURATION: 94 MS
EKG QTC CALCULATION (BAZETT): 472 MS
EKG R AXIS: 71 DEGREES
EKG T AXIS: 79 DEGREES
EKG VENTRICULAR RATE: 107 BPM
EOSINOPHILS ABSOLUTE: 0 E9/L (ref 0.05–0.5)
EOSINOPHILS RELATIVE PERCENT: 0 % (ref 0–6)
EPITHELIAL CELLS, UA: ABNORMAL /HPF
FERRITIN: 113 NG/ML
FIBRINOGEN: >700 MG/DL (ref 225–540)
FIO2 ARTERIAL: 3
GFR AFRICAN AMERICAN: 56
GFR NON-AFRICAN AMERICAN: 46 ML/MIN/1.73
GLUCOSE BLD-MCNC: 95 MG/DL (ref 74–99)
GLUCOSE URINE: NEGATIVE MG/DL
HCO3 ARTERIAL: 21.4 MMOL/L (ref 22–26)
HCT VFR BLD CALC: 40.5 % (ref 34–48)
HEMOGLOBIN: 13.6 G/DL (ref 11.5–15.5)
KETONES, URINE: NEGATIVE MG/DL
LACTATE DEHYDROGENASE: 173 U/L (ref 135–214)
LACTIC ACID, SEPSIS: 0.8 MMOL/L (ref 0.5–1.9)
LACTIC ACID, SEPSIS: 1.1 MMOL/L (ref 0.5–1.9)
LEUKOCYTE ESTERASE, URINE: ABNORMAL
LIPASE: 49 U/L (ref 13–60)
LYMPHOCYTES ABSOLUTE: 0.19 E9/L (ref 1.5–4)
LYMPHOCYTES RELATIVE PERCENT: 1.7 % (ref 20–42)
MCH RBC QN AUTO: 33 PG (ref 26–35)
MCHC RBC AUTO-ENTMCNC: 33.6 % (ref 32–34.5)
MCV RBC AUTO: 98.3 FL (ref 80–99.9)
MONOCYTES ABSOLUTE: 0.19 E9/L (ref 0.1–0.95)
MONOCYTES RELATIVE PERCENT: 1.7 % (ref 2–12)
NEUTROPHILS ABSOLUTE: 9.22 E9/L (ref 1.8–7.3)
NEUTROPHILS RELATIVE PERCENT: 96.6 % (ref 43–80)
NITRITE, URINE: NEGATIVE
O2 SATURATION: 97.3 % (ref 92–98.5)
OPERATOR ID: 557
PCO2 ARTERIAL: 32.1 MMHG (ref 35–45)
PDW BLD-RTO: 13.6 FL (ref 11.5–15)
PH BLOOD GAS: 7.43 (ref 7.35–7.45)
PH UA: 6 (ref 5–9)
PLATELET # BLD: 198 E9/L (ref 130–450)
PMV BLD AUTO: 10.9 FL (ref 7–12)
PO2 ARTERIAL: 89.3 MMHG (ref 80–100)
POIKILOCYTES: ABNORMAL
POTASSIUM REFLEX MAGNESIUM: 3.8 MMOL/L (ref 3.5–5)
PROCALCITONIN: 0.21 NG/ML (ref 0–0.08)
PROTEIN UA: ABNORMAL MG/DL
RBC # BLD: 4.12 E12/L (ref 3.5–5.5)
RBC UA: ABNORMAL /HPF (ref 0–2)
SARS-COV-2, NAAT: DETECTED
SEDIMENTATION RATE, ERYTHROCYTE: 45 MM/HR (ref 0–20)
SODIUM BLD-SCNC: 131 MMOL/L (ref 132–146)
SOURCE, BLOOD GAS: ABNORMAL
SPECIFIC GRAVITY UA: 1.01 (ref 1–1.03)
TOTAL PROTEIN: 7.2 G/DL (ref 6.4–8.3)
TRICHOMONAS: PRESENT /HPF
UROBILINOGEN, URINE: 0.2 E.U./DL
WBC # BLD: 9.5 E9/L (ref 4.5–11.5)
WBC UA: ABNORMAL /HPF (ref 0–5)

## 2021-11-01 PROCEDURE — 85378 FIBRIN DEGRADE SEMIQUANT: CPT

## 2021-11-01 PROCEDURE — 93005 ELECTROCARDIOGRAM TRACING: CPT | Performed by: STUDENT IN AN ORGANIZED HEALTH CARE EDUCATION/TRAINING PROGRAM

## 2021-11-01 PROCEDURE — 86140 C-REACTIVE PROTEIN: CPT

## 2021-11-01 PROCEDURE — 87150 DNA/RNA AMPLIFIED PROBE: CPT

## 2021-11-01 PROCEDURE — 2580000003 HC RX 258: Performed by: INTERNAL MEDICINE

## 2021-11-01 PROCEDURE — 96361 HYDRATE IV INFUSION ADD-ON: CPT

## 2021-11-01 PROCEDURE — 6370000000 HC RX 637 (ALT 250 FOR IP): Performed by: INTERNAL MEDICINE

## 2021-11-01 PROCEDURE — 2580000003 HC RX 258: Performed by: STUDENT IN AN ORGANIZED HEALTH CARE EDUCATION/TRAINING PROGRAM

## 2021-11-01 PROCEDURE — 85651 RBC SED RATE NONAUTOMATED: CPT

## 2021-11-01 PROCEDURE — 85384 FIBRINOGEN ACTIVITY: CPT

## 2021-11-01 PROCEDURE — 80053 COMPREHEN METABOLIC PANEL: CPT

## 2021-11-01 PROCEDURE — 6360000002 HC RX W HCPCS: Performed by: INTERNAL MEDICINE

## 2021-11-01 PROCEDURE — 87088 URINE BACTERIA CULTURE: CPT

## 2021-11-01 PROCEDURE — 83615 LACTATE (LD) (LDH) ENZYME: CPT

## 2021-11-01 PROCEDURE — 36415 COLL VENOUS BLD VENIPUNCTURE: CPT

## 2021-11-01 PROCEDURE — 82728 ASSAY OF FERRITIN: CPT

## 2021-11-01 PROCEDURE — 85025 COMPLETE CBC W/AUTO DIFF WBC: CPT

## 2021-11-01 PROCEDURE — 93010 ELECTROCARDIOGRAM REPORT: CPT | Performed by: INTERNAL MEDICINE

## 2021-11-01 PROCEDURE — 83605 ASSAY OF LACTIC ACID: CPT

## 2021-11-01 PROCEDURE — 6370000000 HC RX 637 (ALT 250 FOR IP): Performed by: STUDENT IN AN ORGANIZED HEALTH CARE EDUCATION/TRAINING PROGRAM

## 2021-11-01 PROCEDURE — 96365 THER/PROPH/DIAG IV INF INIT: CPT

## 2021-11-01 PROCEDURE — 84145 PROCALCITONIN (PCT): CPT

## 2021-11-01 PROCEDURE — 96367 TX/PROPH/DG ADDL SEQ IV INF: CPT

## 2021-11-01 PROCEDURE — 99285 EMERGENCY DEPT VISIT HI MDM: CPT

## 2021-11-01 PROCEDURE — 82803 BLOOD GASES ANY COMBINATION: CPT

## 2021-11-01 PROCEDURE — 2060000000 HC ICU INTERMEDIATE R&B

## 2021-11-01 PROCEDURE — 6360000002 HC RX W HCPCS: Performed by: STUDENT IN AN ORGANIZED HEALTH CARE EDUCATION/TRAINING PROGRAM

## 2021-11-01 PROCEDURE — 99223 1ST HOSP IP/OBS HIGH 75: CPT | Performed by: INTERNAL MEDICINE

## 2021-11-01 PROCEDURE — 87040 BLOOD CULTURE FOR BACTERIA: CPT

## 2021-11-01 PROCEDURE — 81001 URINALYSIS AUTO W/SCOPE: CPT

## 2021-11-01 PROCEDURE — 83690 ASSAY OF LIPASE: CPT

## 2021-11-01 PROCEDURE — 71045 X-RAY EXAM CHEST 1 VIEW: CPT

## 2021-11-01 PROCEDURE — 87635 SARS-COV-2 COVID-19 AMP PRB: CPT

## 2021-11-01 RX ORDER — CLONAZEPAM 1 MG/1
1 TABLET ORAL 2 TIMES DAILY
Status: DISCONTINUED | OUTPATIENT
Start: 2021-11-01 | End: 2021-11-01

## 2021-11-01 RX ORDER — SODIUM CHLORIDE 0.9 % (FLUSH) 0.9 %
5-40 SYRINGE (ML) INJECTION PRN
Status: DISCONTINUED | OUTPATIENT
Start: 2021-11-01 | End: 2021-11-11 | Stop reason: HOSPADM

## 2021-11-01 RX ORDER — SODIUM CHLORIDE 9 MG/ML
25 INJECTION, SOLUTION INTRAVENOUS PRN
Status: DISCONTINUED | OUTPATIENT
Start: 2021-11-01 | End: 2021-11-11 | Stop reason: HOSPADM

## 2021-11-01 RX ORDER — DEXAMETHASONE SODIUM PHOSPHATE 10 MG/ML
6 INJECTION INTRAMUSCULAR; INTRAVENOUS EVERY 24 HOURS
Status: COMPLETED | OUTPATIENT
Start: 2021-11-02 | End: 2021-11-11

## 2021-11-01 RX ORDER — GABAPENTIN 300 MG/1
600 CAPSULE ORAL 2 TIMES DAILY
Status: DISCONTINUED | OUTPATIENT
Start: 2021-11-02 | End: 2021-11-11 | Stop reason: HOSPADM

## 2021-11-01 RX ORDER — GUAIFENESIN/DEXTROMETHORPHAN 100-10MG/5
10 SYRUP ORAL ONCE
Status: COMPLETED | OUTPATIENT
Start: 2021-11-01 | End: 2021-11-01

## 2021-11-01 RX ORDER — BUSPIRONE HYDROCHLORIDE 10 MG/1
10 TABLET ORAL 2 TIMES DAILY
Status: DISCONTINUED | OUTPATIENT
Start: 2021-11-01 | End: 2021-11-11 | Stop reason: HOSPADM

## 2021-11-01 RX ORDER — GUAIFENESIN/DEXTROMETHORPHAN 100-10MG/5
5 SYRUP ORAL EVERY 4 HOURS PRN
Status: DISCONTINUED | OUTPATIENT
Start: 2021-11-01 | End: 2021-11-11 | Stop reason: HOSPADM

## 2021-11-01 RX ORDER — FLUOXETINE HYDROCHLORIDE 40 MG/1
40 CAPSULE ORAL DAILY
Status: ON HOLD | COMMUNITY
End: 2021-11-10 | Stop reason: HOSPADM

## 2021-11-01 RX ORDER — GABAPENTIN 600 MG/1
600 TABLET ORAL 2 TIMES DAILY
Status: ON HOLD | COMMUNITY
End: 2021-11-10 | Stop reason: HOSPADM

## 2021-11-01 RX ORDER — 0.9 % SODIUM CHLORIDE 0.9 %
1000 INTRAVENOUS SOLUTION INTRAVENOUS ONCE
Status: COMPLETED | OUTPATIENT
Start: 2021-11-01 | End: 2021-11-01

## 2021-11-01 RX ORDER — ONDANSETRON 2 MG/ML
4 INJECTION INTRAMUSCULAR; INTRAVENOUS EVERY 6 HOURS PRN
Status: DISCONTINUED | OUTPATIENT
Start: 2021-11-01 | End: 2021-11-11 | Stop reason: HOSPADM

## 2021-11-01 RX ORDER — ACETAMINOPHEN 325 MG/1
650 TABLET ORAL ONCE
Status: COMPLETED | OUTPATIENT
Start: 2021-11-01 | End: 2021-11-01

## 2021-11-01 RX ORDER — ACETAMINOPHEN 325 MG/1
650 TABLET ORAL EVERY 6 HOURS PRN
Status: DISCONTINUED | OUTPATIENT
Start: 2021-11-01 | End: 2021-11-11 | Stop reason: HOSPADM

## 2021-11-01 RX ORDER — ONDANSETRON 4 MG/1
4 TABLET, ORALLY DISINTEGRATING ORAL EVERY 8 HOURS PRN
Status: DISCONTINUED | OUTPATIENT
Start: 2021-11-01 | End: 2021-11-11 | Stop reason: HOSPADM

## 2021-11-01 RX ORDER — CLONAZEPAM 2 MG/1
1 TABLET ORAL 2 TIMES DAILY PRN
Status: ON HOLD | COMMUNITY
End: 2021-11-10 | Stop reason: HOSPADM

## 2021-11-01 RX ORDER — ALBUTEROL SULFATE 2.5 MG/3ML
2.5 SOLUTION RESPIRATORY (INHALATION) EVERY 6 HOURS PRN
Status: DISCONTINUED | OUTPATIENT
Start: 2021-11-01 | End: 2021-11-11 | Stop reason: HOSPADM

## 2021-11-01 RX ORDER — GABAPENTIN 400 MG/1
1200 CAPSULE ORAL DAILY
Status: DISCONTINUED | OUTPATIENT
Start: 2021-11-01 | End: 2021-11-01

## 2021-11-01 RX ORDER — IPRATROPIUM BROMIDE AND ALBUTEROL SULFATE 2.5; .5 MG/3ML; MG/3ML
1 SOLUTION RESPIRATORY (INHALATION)
Status: DISCONTINUED | OUTPATIENT
Start: 2021-11-01 | End: 2021-11-01 | Stop reason: CLARIF

## 2021-11-01 RX ORDER — VITAMIN B COMPLEX
2000 TABLET ORAL DAILY
Status: DISCONTINUED | OUTPATIENT
Start: 2021-11-01 | End: 2021-11-11 | Stop reason: HOSPADM

## 2021-11-01 RX ORDER — SODIUM CHLORIDE 0.9 % (FLUSH) 0.9 %
5-40 SYRINGE (ML) INJECTION EVERY 12 HOURS SCHEDULED
Status: DISCONTINUED | OUTPATIENT
Start: 2021-11-01 | End: 2021-11-11 | Stop reason: HOSPADM

## 2021-11-01 RX ORDER — BENZTROPINE MESYLATE 1 MG/1
1 TABLET ORAL 2 TIMES DAILY
Status: DISCONTINUED | OUTPATIENT
Start: 2021-11-01 | End: 2021-11-11 | Stop reason: HOSPADM

## 2021-11-01 RX ORDER — FLUOXETINE HYDROCHLORIDE 20 MG/1
20 CAPSULE ORAL DAILY
Status: DISCONTINUED | OUTPATIENT
Start: 2021-11-01 | End: 2021-11-01

## 2021-11-01 RX ORDER — FLUTICASONE PROPIONATE 110 UG/1
1 AEROSOL, METERED RESPIRATORY (INHALATION) 2 TIMES DAILY
Status: DISCONTINUED | OUTPATIENT
Start: 2021-11-01 | End: 2021-11-11 | Stop reason: HOSPADM

## 2021-11-01 RX ORDER — BUDESONIDE 0.5 MG/2ML
0.5 INHALANT ORAL 2 TIMES DAILY
Status: DISCONTINUED | OUTPATIENT
Start: 2021-11-01 | End: 2021-11-01 | Stop reason: CLARIF

## 2021-11-01 RX ORDER — FLUOXETINE HYDROCHLORIDE 20 MG/1
60 CAPSULE ORAL DAILY
Status: DISCONTINUED | OUTPATIENT
Start: 2021-11-02 | End: 2021-11-11 | Stop reason: HOSPADM

## 2021-11-01 RX ORDER — POLYETHYLENE GLYCOL 3350 17 G/17G
17 POWDER, FOR SOLUTION ORAL DAILY PRN
Status: DISCONTINUED | OUTPATIENT
Start: 2021-11-01 | End: 2021-11-11 | Stop reason: HOSPADM

## 2021-11-01 RX ORDER — TRAZODONE HYDROCHLORIDE 50 MG/1
50 TABLET ORAL NIGHTLY PRN
Status: DISCONTINUED | OUTPATIENT
Start: 2021-11-01 | End: 2021-11-11 | Stop reason: HOSPADM

## 2021-11-01 RX ORDER — CLONAZEPAM 1 MG/1
1 TABLET ORAL 2 TIMES DAILY PRN
Status: DISCONTINUED | OUTPATIENT
Start: 2021-11-01 | End: 2021-11-11 | Stop reason: HOSPADM

## 2021-11-01 RX ORDER — GABAPENTIN 400 MG/1
CAPSULE ORAL
Status: DISCONTINUED
Start: 2021-11-01 | End: 2021-11-01 | Stop reason: WASHOUT

## 2021-11-01 RX ORDER — ACETAMINOPHEN 650 MG/1
650 SUPPOSITORY RECTAL EVERY 6 HOURS PRN
Status: DISCONTINUED | OUTPATIENT
Start: 2021-11-01 | End: 2021-11-11 | Stop reason: HOSPADM

## 2021-11-01 RX ADMIN — GUAIFENESIN AND DEXTROMETHORPHAN 10 ML: 100; 10 SYRUP ORAL at 11:23

## 2021-11-01 RX ADMIN — DEXAMETHASONE 6 MG: 2 TABLET ORAL at 10:22

## 2021-11-01 RX ADMIN — ACETAMINOPHEN 650 MG: 325 TABLET ORAL at 09:23

## 2021-11-01 RX ADMIN — VANCOMYCIN HYDROCHLORIDE 1000 MG: 1 INJECTION, POWDER, LYOPHILIZED, FOR SOLUTION INTRAVENOUS at 09:50

## 2021-11-01 RX ADMIN — SODIUM CHLORIDE 1000 ML: 9 INJECTION, SOLUTION INTRAVENOUS at 09:23

## 2021-11-01 RX ADMIN — FLUOXETINE 20 MG: 20 CAPSULE ORAL at 20:21

## 2021-11-01 RX ADMIN — BUSPIRONE HYDROCHLORIDE 10 MG: 10 TABLET ORAL at 20:05

## 2021-11-01 RX ADMIN — BENZTROPINE MESYLATE 1 MG: 1 TABLET ORAL at 20:17

## 2021-11-01 RX ADMIN — Medication 10 ML: at 22:12

## 2021-11-01 RX ADMIN — ENOXAPARIN SODIUM 30 MG: 100 INJECTION SUBCUTANEOUS at 20:04

## 2021-11-01 RX ADMIN — Medication 2000 UNITS: at 20:04

## 2021-11-01 RX ADMIN — BARICITINIB 2 MG: 2 TABLET, FILM COATED ORAL at 20:04

## 2021-11-01 RX ADMIN — WATER 1000 MG: 1 INJECTION INTRAMUSCULAR; INTRAVENOUS; SUBCUTANEOUS at 09:26

## 2021-11-01 ASSESSMENT — ENCOUNTER SYMPTOMS
EYE PAIN: 0
TROUBLE SWALLOWING: 0
SHORTNESS OF BREATH: 1
ABDOMINAL PAIN: 1
COLOR CHANGE: 0
BACK PAIN: 0
DIARRHEA: 0
VOMITING: 0
FACIAL SWELLING: 0
CONSTIPATION: 0
CHEST TIGHTNESS: 0
NAUSEA: 0
BLOOD IN STOOL: 0
PHOTOPHOBIA: 0
ABDOMINAL DISTENTION: 0
WHEEZING: 0
RHINORRHEA: 0

## 2021-11-01 ASSESSMENT — PAIN SCALES - GENERAL: PAINLEVEL_OUTOF10: 6

## 2021-11-01 NOTE — H&P
Department of Internal Medicine  General Internal Medicine  Attending History and Physical      CHIEF COMPLAINT:  Shortness of breath    Reason for Admission:  Acute Respiratory failure with Hypoxia    History Obtained From:  patient    HISTORY OF PRESENT ILLNESS:      The patient is a 63 y.o. female with significant past medical history of COPD, Depression who presents with dyspnea and fatigue. She also noted headache. Onset of her symptoms has been more than one week. Her symptom gradually progressed over the last 3 days. Today her symptom became worse and she decided to seek medical attention. In the ER, she tested positive for covid and was admitted for further management. Past Medical History:        Diagnosis Date    Alcoholism [facility])     H/O    COPD (chronic obstructive [facility] disease) [facility]     Dental caries     Hypertension     no meds    Lung nodules      [place]  [facility]ia [facility]     stable    Ventricular hypokinesis     H/O     Past Surgical History:        Procedure Laterality Date    APPENDECTOMY       SECTION      2 C SECTION    ECHO COMPLETE  1/10/2014         CO DENTAL SURGERY PROCEDURE N/A 2018    MULTIPLE DENTAL EXTRACTIONS AND ALVELOPLASTY performed by Danyel Phelps DDS at [address] [place] [address] Right     WRIST SURGERY Right        Medications Prior to Admission:    1. Benztropine  2. Latuda   3. Advir  4. Gabapentin    Allergies:  Codeine, Dust mite extract, Aspirin, and Penicillins    Social History:   TOBACCO:   reports that she has been smoking. She has a 42.00 pack-year smoking history.  She has never used smokeless tobacco.    Family History:       Problem Relation Age of Onset    Other Mother         schizophrenia    Cancer Father      REVIEW OF SYSTEMS:  CONSTITUTIONAL:  positive for  fevers and sweats  EYES:  negative for  contacts and glasses  HEENT:  negative for  hearing loss and tinnitus  RESPIRATORY:  positive for able.  Breathing treatment and pulm hygiene  Incentive spirometry and Flutter  Currently on 2L of oxygen and saturating well    2. Covid 19 Viral infection:  Consult Pharmacy for Baricitinib  Monitor inflammatory markers    3. COPD with Mild Exacerbation:  Pulmicort  On decadron  Continue to monitor    4. Hx of Schizophrenia/Depression:   Continue latuda and Prozac    5.   Mild WILLIE/CKD  Stable  Continue to monitor      Anticoagulation per protocol

## 2021-11-01 NOTE — ED PROVIDER NOTES
lungs daily Use am dos, Disp: , Rfl:     fluticasone-salmeterol (ADVAIR) 500-50 MCG/DOSE diskus inhaler, Inhale 1 puff into the lungs every 12 hours Use am dos, Disp: , Rfl:     gabapentin (NEURONTIN) 300 MG capsule, Take 1,200 mg by mouth daily. Instructed to take with sip water am of procedure., Disp: , Rfl:     clonazePAM (KLONOPIN) 0.5 MG tablet, Take 1 mg by mouth 2 times daily Instructed to take with sip water am of procedure, if needed. ., Disp: , Rfl:      Review of Systems   Constitutional: Positive for fever. Negative for chills and fatigue. HENT: Negative for facial swelling, rhinorrhea and trouble swallowing. Eyes: Negative for photophobia and pain. Respiratory: Positive for shortness of breath. Negative for chest tightness and wheezing. Cardiovascular: Positive for chest pain. Negative for palpitations. Gastrointestinal: Positive for abdominal pain (Chronic). Negative for abdominal distention, blood in stool, constipation, diarrhea, nausea and vomiting. Genitourinary: Negative for decreased urine volume, difficulty urinating, dysuria, flank pain, hematuria, pelvic pain, urgency, vaginal bleeding and vaginal discharge. Musculoskeletal: Negative for back pain and neck pain. Skin: Negative for color change and rash. Neurological: Positive for headaches. Negative for syncope, weakness, light-headedness and numbness. Psychiatric/Behavioral: Negative for agitation, behavioral problems and confusion. Physical Exam  Constitutional:       General: She is not in acute distress. Appearance: Normal appearance. She is ill-appearing. She is not diaphoretic. Comments: Thin   HENT:      Head: Normocephalic and atraumatic. Right Ear: External ear normal.      Left Ear: External ear normal.      Nose: Nose normal.      Mouth/Throat:      Pharynx: Oropharynx is clear. No oropharyngeal exudate.       Comments: dry  Eyes:      Conjunctiva/sclera: Conjunctivae normal.      Pupils: Pupils are equal, round, and reactive to light. Cardiovascular:      Rate and Rhythm: Regular rhythm. Tachycardia present. Pulses: Normal pulses. Heart sounds: Normal heart sounds. Pulmonary:      Effort: Tachypnea present. Breath sounds: No stridor. Examination of the right-upper field reveals rhonchi. Examination of the left-upper field reveals rhonchi. Examination of the right-middle field reveals rhonchi. Examination of the left-middle field reveals rhonchi. Rhonchi present. No wheezing or rales. Chest:      Chest wall: No mass, deformity or tenderness. Abdominal:      General: Bowel sounds are normal. There is no distension. Palpations: Abdomen is soft. Tenderness: There is abdominal tenderness (epigastric). There is no guarding. Musculoskeletal:         General: No swelling or tenderness. Normal range of motion. Cervical back: Normal range of motion. Right lower leg: No tenderness. No edema. Left lower leg: No tenderness. No edema. Skin:     General: Skin is warm and dry. Capillary Refill: Capillary refill takes less than 2 seconds. Coloration: Skin is not cyanotic or pale. Findings: No erythema or rash. Neurological:      General: No focal deficit present. Mental Status: She is alert and oriented to person, place, and time. Psychiatric:         Mood and Affect: Mood normal.         Behavior: Behavior normal.          Procedures     MDM  Number of Diagnoses or Management Options  Acute respiratory failure with hypoxia (HCC)  WILLIE (acute kidney injury) ([facility]  COVID  Diagnosis management comments: Mrs. 1981 [facility] 82-year-old female patient who presents today via EMS for cough, shortness of breath, fever which she states has been going on for 2 weeks however became worse this morning. She also relates having chest pain, abdominal pain which she states is chronic, generalized body aches. States she has had both COVID vaccines.   She denies other review of systems. On physical exam, patient thin in appearance, ONEAL x3 speaking in full uninterrupted sentences, tachypneic with a rate of 24 breaths/min, cough present. She is moderately ill-appearing. Febrile with temperature of 103.1. Tachycardic at a rate of 122. Skin is warm to the touch and dry. Oropharynx is dry. Moderate rhonchi appreciated bilaterally. Heart rate tachycardic regular. Abdomen is tender to palpation in the epigastric region. Remainder the physical exam is benign. Sepsis protocol and labwork was initiated. Rapid Covid was ordered. Patient was given Vanco and Rocephin IV empirically. 1.5 L IV bolus fluids was given bolus as well as Tylenol for the fever. CBC was unremarkable, no leukocytosis present. Lactic within normal limits. Lipase within normal limits. Mild WILLIE with creatinine of 1.2, BUN 23. Chest x-ray showed no evidence of acute cardiopulmonary disease. Covid POSITIVE. Currently SPO2 98% on room air. Patient states she is feeling bit better after the Tylenol. Repeat EKG was ordered due to some P wave abnormalities in the initial EKG. p.o. Decadron was given. Robitussin was given for cough. Dr. Derrek Brito, hospitalist, was consulted and will admit pt for further treatment of her acute hypoxia due to COVID 19. D-dimer was ordered for PE r/o. Further lab work including LDH, ferritin, CRP, procalcitonin, and fibrinogen were ordered. ED Course as of Nov 01 1223   Mon Nov 01, 2021   8381   ATTENDING PROVIDER ATTESTATION:     I have personally performed and/or participated in the history, exam, medical decision making, and procedures and agree with all pertinent clinical information unless otherwise noted. I have also reviewed and agree with the past medical, family and social history unless otherwise noted.     I have discussed this patient in detail with the resident and provided the instruction and education regarding the evidence-based evaluation and treatment of evaluation of nausea, vomiting, shortness of breath, and fever. History: Patient states that yesterday she developed the above symptoms. She reports that she does occasionally have some abdominal pain which she states is not new. She states that comes and goes. Not currently having any abdominal pain. She states she is currently short of breath. She also admits that this is chronic but has gotten worse since this morning. Denies any chest pain. States that she has been vaccinated against Covid but has not had Covid. Denies any sick contacts. My findings: Maggi Lozada is a 63 y.o. female whom is in no distress. Physical exam reveals patient to be in no significant distress. No conversational dyspnea or accessory muscle use. Lungs are coarse to auscultation. Abdomen soft nontender. Is tachycardic and is currently febrile. Appears clinically dry. My plan: Symptomatic and supportive care. [facility] labs and imaging. Electronically signed by Rama Foreman DO on 11/1/21 at 9:17 AM EDT          [MS]   1025 Patient Covid positive. Temp 101. 3. Heart rate 106. SPO2 98% on room air.  P.o. Decadron was given. Vitals have improved and patient is feeling mildly improved at this time. [PW]   180 [id number] Nurse performed ambulatory pulse ox, patient became more tachycardic and more short of breath when walked and SPO2 dropped to 90% on room air. [PW]      ED Course User Index  [MS] Rama Foreman DO  [PW] Hayde Gil DO      EKG Interpretation  Interpreted by emergency department physician. 11/1/21  Time: 0935    Rate: 114  Axis: rightward  FL: 150  QRS: 92  Qtc: 441  Rhythm: sinus  Clinical Impression: Sinus tach, no ST segment changes  Comparison to old EKG: More tachycardic when compared to previous on 9/14/21 which showed an accelerated junctional rhythm. EKG Interpretation  Interpreted by emergency department physician.     11/1/21  Time: 1027    Rate: 107  Axis: 1103 Nurse performed ambulatory pulse ox, patient became more tachycardic and more short of breath when walked and SPO2 dropped to 90% on room air. [PW]      ED Course User Index  [MS] Celestino Whitney DO  [PW] Claudia Hu, DO       --------------------------------------------- PAST HISTORY ---------------------------------------------  Past Medical History:  has a past medical history of Alcoholism ([facility] COPD (chronic obstructive pulmonary disease) ([facility] Dental caries, Hypertension, Lung nodules, Schizophrenia ([facility] and Ventricular hypokinesis. Past Surgical History:  has a past surgical history that includes Echo Complete (1/10/2014); Appendectomy;  section; Wrist surgery (Right); shoulder surgery (Right); and pr dental surgery procedure (N/A, 2018). Social History:  reports that she has been smoking. She has a 42.00 pack-year smoking history. She has never used smokeless tobacco. She reports current alcohol use. She reports that she does not use drugs. Family History: family history includes Cancer in her father; Other in her mother. The patients home medications have been reviewed.     Allergies: Codeine, Dust mite extract, Aspirin, and Penicillins    -------------------------------------------------- RESULTS -------------------------------------------------    LABS:  Results for orders placed or performed during the hospital encounter of 21   SARS-CoV-2 NAAT (Rapid)    Specimen: Nasopharyngeal Swab   Result Value Ref Range    SARS-CoV-2, NAAT DETECTED (A) Not Detected   CBC Auto Differential   Result Value Ref Range    WBC 9.5 4.5 - 11.5 E9/L    RBC 4.12 3.50 - 5.50 E12/L    Hemoglobin 13.6 11.5 - 15.5 g/dL    Hematocrit 40.5 34.0 - 48.0 %    MCV 98.3 80.0 - 99.9 fL    MCH 33.0 26.0 - 35.0 pg    MCHC 33.6 32.0 - 34.5 %    RDW 13.6 11.5 - 15.0 fL    Platelets 410 179 - 296 E9/L    MPV 10.9 7.0 - 12.0 fL    Neutrophils % 96.6 (H) 43.0 - 80.0 %    Lymphocytes % 1.7 (L) 20.0 - 42.0 %    Monocytes % 1.7 (L) 2.0 - 12.0 %    Eosinophils % 0.0 0.0 - 6.0 %    Basophils % 0.2 0.0 - 2.0 %    Neutrophils Absolute 9.22 (H) 1.80 - 7.30 E9/L    Lymphocytes Absolute 0.19 (L) 1.50 - 4.00 E9/L    Monocytes Absolute 0.19 0.10 - 0.95 E9/L    Eosinophils Absolute 0.00 (L) 0.05 - 0.50 E9/L    Basophils Absolute 0.00 0.00 - 0.20 E9/L    Poikilocytes 1+     [place] Cells 1+    Comprehensive Metabolic Panel w/ Reflex to MG   Result Value Ref Range    Sodium 131 (L) 132 - 146 mmol/L    Potassium reflex Magnesium 3.8 3.5 - 5.0 mmol/L    Chloride 94 (L) 98 - 107 mmol/L    CO2 25 22 - 29 mmol/L    Anion Gap 12 7 - 16 mmol/L    Glucose 95 74 - 99 mg/dL    BUN 23 (H) 6 - 20 mg/dL    CREATININE 1.2 (H) 0.5 - 1.0 mg/dL    GFR Non-African American 46 >=60 mL/min/1.73    GFR African American 56     Calcium 8.7 8.6 - 10.2 mg/dL    Total Protein 7.2 6.4 - 8.3 g/dL    Albumin 3.8 3.5 - 5.2 g/dL    Total Bilirubin 0.3 0.0 - 1.2 mg/dL    Alkaline Phosphatase 95 35 - 104 U/L    ALT 11 0 - 32 U/L    AST 22 0 - 31 U/L   Lipase   Result Value Ref Range    Lipase 49 13 - 60 U/L   Lactate, Sepsis   Result Value Ref Range    Lactic Acid, Sepsis 1.1 0.5 - 1.9 mmol/L   Lactate, Sepsis   Result Value Ref Range    Lactic Acid, Sepsis 0.8 0.5 - 1.9 mmol/L   Sedimentation Rate   Result Value Ref Range    Sed Rate 45 (H) 0 - 20 mm/Hr   Arterial Blood Gas, Respiratory Only   Result Value Ref Range    Source: Arterial     FIO2 Arterial 3.0     pH, Blood Gas 7.432 7.350 - 7.450    pCO2, Arterial 32.1 (L) 35.0 - 45.0 mmHg    pO2, Arterial 89.3 80.0 - 100.0 mmHg    HCO3, Arterial 21.4 (L) 22.0 - 26.0 mmol/L    B.E. -2.1 -3.0 - 3.0 mmol/L    O2 Sat 97.3 92.0 - 98.5 %          DEVICE 17,310,521,400,678     Delivery Systems Cannula    D-Dimer, Quantitative   Result Value Ref Range    D-Dimer, Quant 402 ng/mL DDU   Fibrinogen   Result Value Ref Range    Fibrinogen >700 (H) 225 - 540 mg/dL   EKG 12 Lead   Result Value Ref Range Ventricular Rate 107 BPM    Atrial Rate 107 BPM    QRS Duration 94 ms    Q-T Interval 354 ms    QTc Calculation (Bazett) 472 ms    R Axis 71 degrees    T Axis 79 degrees       RADIOLOGY:  XR CHEST PORTABLE   Final Result   No radiographic evidence of acute cardiopulmonary disease process               ------------------------- NURSING NOTES AND VITALS REVIEWED ---------------------------  Date / Time Roomed:  11/1/2021  8:52 AM  ED Bed Assignment:  03/03    The nursing notes within the ED encounter and vital signs as below have been reviewed. Patient Vitals for the past 24 hrs:   BP Temp Temp src Pulse Resp SpO2 Weight   11/01/21 1022 121/88 101.3 °F (38.5 °C) Oral 106 24 98 %    11/01/21 0949 (!) 118/94   108 28 100 %    11/01/21 0915       145 lb 1 oz (65.8 kg)   11/01/21 0913      99 %    11/01/21 0912      (!) 87 %    11/01/21 0907 (!) 157/101 103.1 °F (39.5 °C) Oral 122 24 97 %    11/01/21 0857 (!) 149/84 103 °F (39.4 °C) Oral 133 26 100 %        Oxygen Saturation Interpretation: Abnormal when ambulating    ------------------------------------------ PROGRESS NOTES ------------------------------------------  Re-evaluation(s):  Time: 1100  Patients symptoms show no change  Repeat physical examination is improved, temp reduced to 101.3, HR decreased to 106. Counseling:  I have spoken with the patient and discussed todays results, in addition to providing specific details for the plan of care and counseling regarding the diagnosis and prognosis. Their questions are answered at this time and they are agreeable with the plan of admission.    --------------------------------- ADDITIONAL PROVIDER NOTES ---------------------------------  Consultations:  Time: 1115. Spoke with Dr. Aaron Winn. Discussed case. They will admit the patient.   This patient's ED course included: a personal history and physicial examination    This patient has remained hemodynamically stable during their ED course. Diagnosis:  1. Acute respiratory failure with hypoxia ([facility]    2. COVID    3. WILLIE (acute kidney injury) ([facility]        Disposition:  Patient's disposition: Admit to telemetry  Patient's condition is fair.        Erika Mccoy DO  Resident  11/01/21 1104

## 2021-11-01 NOTE — ED NOTES
Bed: 06  Expected date:   Expected time:   Means of arrival:   Comments:     Indigo Elizabeth, BISI  11/01/21 7814

## 2021-11-01 NOTE — ED NOTES
Bed: 03  Expected date:   Expected time:   Means of arrival:   Comments:  Via Bernardo Benoit RN  11/01/21 4550

## 2021-11-01 NOTE — ED NOTES
Pt only able to ambulate to doorway of room , pt became very SOB and unsteady on her feet. Hr 135 and oxygen 90% on RA. Pt walked back to bed and placed back on oxygen.        Balta Watson, BISI  11/01/21 1111 [address] [place] [address], RN  11/01/21 0768

## 2021-11-02 LAB
ALBUMIN SERPL-MCNC: 3.6 G/DL (ref 3.5–5.2)
ALP BLD-CCNC: 98 U/L (ref 35–104)
ALT SERPL-CCNC: 13 U/L (ref 0–32)
ANION GAP SERPL CALCULATED.3IONS-SCNC: 8 MMOL/L (ref 7–16)
AST SERPL-CCNC: 23 U/L (ref 0–31)
BASOPHILS ABSOLUTE: 0.01 E9/L (ref 0–0.2)
BASOPHILS RELATIVE PERCENT: 0.1 % (ref 0–2)
BILIRUB SERPL-MCNC: 0.2 MG/DL (ref 0–1.2)
BOTTLE TYPE: ABNORMAL
BUN BLDV-MCNC: 20 MG/DL (ref 6–20)
C-REACTIVE PROTEIN: 5.2 MG/DL (ref 0–0.4)
C-REACTIVE PROTEIN: 6.2 MG/DL (ref 0–0.4)
CALCIUM SERPL-MCNC: 8.7 MG/DL (ref 8.6–10.2)
CANDIDA ALBICANS BY PCR: NOT DETECTED
CANDIDA GLABRATA BY PCR: NOT DETECTED
CANDIDA KRUSEI BY PCR: NOT DETECTED
CANDIDA PARAPSILOSIS BY PCR: NOT DETECTED
CANDIDA TROPICALIS BY PCR: NOT DETECTED
CHLORIDE BLD-SCNC: 100 MMOL/L (ref 98–107)
CO2: 25 MMOL/L (ref 22–29)
CREAT SERPL-MCNC: 1.1 MG/DL (ref 0.5–1)
D DIMER: 603 NG/ML DDU
EKG ATRIAL RATE: 114 BPM
EKG P AXIS: 78 DEGREES
EKG P-R INTERVAL: 150 MS
EKG Q-T INTERVAL: 320 MS
EKG QRS DURATION: 92 MS
EKG QTC CALCULATION (BAZETT): 441 MS
EKG R AXIS: 69 DEGREES
EKG T AXIS: 74 DEGREES
EKG VENTRICULAR RATE: 114 BPM
ENTEROBACTER CLOACAE COMPLEX BY PCR: NOT DETECTED
ENTEROBACTERALES BY PCR: NOT DETECTED
ENTEROCOCCUS BY PCR: NOT DETECTED
EOSINOPHILS ABSOLUTE: 0 E9/L (ref 0.05–0.5)
EOSINOPHILS RELATIVE PERCENT: 0 % (ref 0–6)
ESCHERICHIA COLI BY PCR: NOT DETECTED
FERRITIN: 142 NG/ML
FIBRINOGEN: >700 MG/DL (ref 225–540)
GFR AFRICAN AMERICAN: >60
GFR NON-AFRICAN AMERICAN: 51 ML/MIN/1.73
GLUCOSE BLD-MCNC: 89 MG/DL (ref 74–99)
HAEMOPHILUS INFLUENZAE BY PCR: NOT DETECTED
HCT VFR BLD CALC: 40.5 % (ref 34–48)
HEMOGLOBIN: 13.3 G/DL (ref 11.5–15.5)
IMMATURE GRANULOCYTES #: 0.06 E9/L
IMMATURE GRANULOCYTES %: 0.8 % (ref 0–5)
INR BLD: 1
KLEBSIELLA OXYTOCA BY PCR: NOT DETECTED
KLEBSIELLA PNEUMONIAE GROUP BY PCR: NOT DETECTED
LACTATE DEHYDROGENASE: 198 U/L (ref 135–214)
LISTERIA MONOCYTOGENES BY PCR: NOT DETECTED
LYMPHOCYTES ABSOLUTE: 1.05 E9/L (ref 1.5–4)
LYMPHOCYTES RELATIVE PERCENT: 13.2 % (ref 20–42)
MCH RBC QN AUTO: 33.3 PG (ref 26–35)
MCHC RBC AUTO-ENTMCNC: 32.8 % (ref 32–34.5)
MCV RBC AUTO: 101.5 FL (ref 80–99.9)
METHICILLIN RESISTANCE MECA/C  BY PCR: DETECTED
MONOCYTES ABSOLUTE: 0.63 E9/L (ref 0.1–0.95)
MONOCYTES RELATIVE PERCENT: 7.9 % (ref 2–12)
NEISSERIA MENINGITIDIS BY PCR: NOT DETECTED
NEUTROPHILS ABSOLUTE: 6.19 E9/L (ref 1.8–7.3)
NEUTROPHILS RELATIVE PERCENT: 78 % (ref 43–80)
ORDER NUMBER: ABNORMAL
PDW BLD-RTO: 13.7 FL (ref 11.5–15)
PLATELET # BLD: 207 E9/L (ref 130–450)
PMV BLD AUTO: 11.4 FL (ref 7–12)
POTASSIUM REFLEX MAGNESIUM: 4 MMOL/L (ref 3.5–5)
PROCALCITONIN: 0.3 NG/ML (ref 0–0.08)
PROTEUS SPECIES BY PCR: NOT DETECTED
PROTHROMBIN TIME: 11.8 SEC (ref 9.3–12.4)
PSEUDOMONAS AERUGINOSA BY PCR: NOT DETECTED
RBC # BLD: 3.99 E12/L (ref 3.5–5.5)
SERRATIA MARCESCENS BY PCR: NOT DETECTED
SODIUM BLD-SCNC: 133 MMOL/L (ref 132–146)
SOURCE OF BLOOD CULTURE: ABNORMAL
STAPHYLOCOCCUS AUREUS BY PCR: NOT DETECTED
STAPHYLOCOCCUS SPECIES BY PCR: DETECTED
STREPTOCOCCUS AGALACTIAE BY PCR: NOT DETECTED
STREPTOCOCCUS PNEUMONIAE BY PCR: NOT DETECTED
STREPTOCOCCUS PYOGENES  BY PCR: NOT DETECTED
STREPTOCOCCUS SPECIES BY PCR: NOT DETECTED
TOTAL PROTEIN: 6.9 G/DL (ref 6.4–8.3)
WBC # BLD: 7.9 E9/L (ref 4.5–11.5)

## 2021-11-02 PROCEDURE — 6370000000 HC RX 637 (ALT 250 FOR IP)

## 2021-11-02 PROCEDURE — 93010 ELECTROCARDIOGRAM REPORT: CPT | Performed by: INTERNAL MEDICINE

## 2021-11-02 PROCEDURE — 6370000000 HC RX 637 (ALT 250 FOR IP): Performed by: INTERNAL MEDICINE

## 2021-11-02 PROCEDURE — 82728 ASSAY OF FERRITIN: CPT

## 2021-11-02 PROCEDURE — 2580000003 HC RX 258

## 2021-11-02 PROCEDURE — 85378 FIBRIN DEGRADE SEMIQUANT: CPT

## 2021-11-02 PROCEDURE — 94640 AIRWAY INHALATION TREATMENT: CPT

## 2021-11-02 PROCEDURE — 85025 COMPLETE CBC W/AUTO DIFF WBC: CPT

## 2021-11-02 PROCEDURE — 97530 THERAPEUTIC ACTIVITIES: CPT | Performed by: OCCUPATIONAL THERAPIST

## 2021-11-02 PROCEDURE — 80053 COMPREHEN METABOLIC PANEL: CPT

## 2021-11-02 PROCEDURE — 2580000003 HC RX 258: Performed by: INTERNAL MEDICINE

## 2021-11-02 PROCEDURE — 84145 PROCALCITONIN (PCT): CPT

## 2021-11-02 PROCEDURE — 85384 FIBRINOGEN ACTIVITY: CPT

## 2021-11-02 PROCEDURE — 36415 COLL VENOUS BLD VENIPUNCTURE: CPT

## 2021-11-02 PROCEDURE — 6360000002 HC RX W HCPCS: Performed by: INTERNAL MEDICINE

## 2021-11-02 PROCEDURE — 97165 OT EVAL LOW COMPLEX 30 MIN: CPT | Performed by: OCCUPATIONAL THERAPIST

## 2021-11-02 PROCEDURE — 85610 PROTHROMBIN TIME: CPT

## 2021-11-02 PROCEDURE — 86140 C-REACTIVE PROTEIN: CPT

## 2021-11-02 PROCEDURE — 99233 SBSQ HOSP IP/OBS HIGH 50: CPT | Performed by: INTERNAL MEDICINE

## 2021-11-02 PROCEDURE — 83615 LACTATE (LD) (LDH) ENZYME: CPT

## 2021-11-02 PROCEDURE — 2060000000 HC ICU INTERMEDIATE R&B

## 2021-11-02 RX ORDER — SODIUM CHLORIDE 9 MG/ML
INJECTION, SOLUTION INTRAVENOUS
Status: COMPLETED
Start: 2021-11-02 | End: 2021-11-02

## 2021-11-02 RX ORDER — 0.9 % SODIUM CHLORIDE 0.9 %
500 INTRAVENOUS SOLUTION INTRAVENOUS ONCE
Status: COMPLETED | OUTPATIENT
Start: 2021-11-02 | End: 2021-11-02

## 2021-11-02 RX ADMIN — CEFTRIAXONE SODIUM 1000 MG: 1 INJECTION, POWDER, FOR SOLUTION INTRAMUSCULAR; INTRAVENOUS at 09:50

## 2021-11-02 RX ADMIN — IPRATROPIUM BROMIDE AND ALBUTEROL 1 PUFF: 20; 100 SPRAY, METERED RESPIRATORY (INHALATION) at 11:31

## 2021-11-02 RX ADMIN — IPRATROPIUM BROMIDE AND ALBUTEROL 1 PUFF: 20; 100 SPRAY, METERED RESPIRATORY (INHALATION) at 12:28

## 2021-11-02 RX ADMIN — VANCOMYCIN HYDROCHLORIDE 1000 MG: 1 INJECTION, POWDER, LYOPHILIZED, FOR SOLUTION INTRAVENOUS at 09:51

## 2021-11-02 RX ADMIN — BUSPIRONE HYDROCHLORIDE 10 MG: 10 TABLET ORAL at 09:52

## 2021-11-02 RX ADMIN — FLUTICASONE PROPIONATE 1 PUFF: 110 AEROSOL, METERED RESPIRATORY (INHALATION) at 20:31

## 2021-11-02 RX ADMIN — BENZTROPINE MESYLATE 1 MG: 1 TABLET ORAL at 22:17

## 2021-11-02 RX ADMIN — BARICITINIB 2 MG: 2 TABLET, FILM COATED ORAL at 09:51

## 2021-11-02 RX ADMIN — Medication 10 ML: at 21:51

## 2021-11-02 RX ADMIN — FLUOXETINE 60 MG: 20 CAPSULE ORAL at 09:52

## 2021-11-02 RX ADMIN — GABAPENTIN 600 MG: 400 CAPSULE ORAL at 09:55

## 2021-11-02 RX ADMIN — BENZTROPINE MESYLATE 1 MG: 1 TABLET ORAL at 09:52

## 2021-11-02 RX ADMIN — DEXAMETHASONE SODIUM PHOSPHATE 6 MG: 10 INJECTION INTRAMUSCULAR; INTRAVENOUS at 09:50

## 2021-11-02 RX ADMIN — BUSPIRONE HYDROCHLORIDE 10 MG: 10 TABLET ORAL at 22:17

## 2021-11-02 RX ADMIN — Medication 2000 UNITS: at 09:52

## 2021-11-02 RX ADMIN — SODIUM CHLORIDE 500 ML: 9 INJECTION, SOLUTION INTRAVENOUS at 12:26

## 2021-11-02 RX ADMIN — LURASIDONE HYDROCHLORIDE 40 MG: 40 TABLET, FILM COATED ORAL at 09:52

## 2021-11-02 RX ADMIN — Medication 10 ML: at 09:54

## 2021-11-02 RX ADMIN — ENOXAPARIN SODIUM 30 MG: 100 INJECTION SUBCUTANEOUS at 09:51

## 2021-11-02 RX ADMIN — FLUTICASONE PROPIONATE 1 PUFF: 110 AEROSOL, METERED RESPIRATORY (INHALATION) at 11:31

## 2021-11-02 RX ADMIN — Medication 500 ML: at 12:26

## 2021-11-02 RX ADMIN — GABAPENTIN 600 MG: 300 CAPSULE ORAL at 09:55

## 2021-11-02 ASSESSMENT — PAIN DESCRIPTION - DIRECTION: RADIATING_TOWARDS_2: NECK

## 2021-11-02 ASSESSMENT — PAIN DESCRIPTION - PAIN TYPE
TYPE: ACUTE PAIN
TYPE_2: ACUTE PAIN

## 2021-11-02 ASSESSMENT — PAIN DESCRIPTION - INTENSITY: RATING_2: 10

## 2021-11-02 ASSESSMENT — PAIN DESCRIPTION - LOCATION
LOCATION_2: HEAD
LOCATION: ABDOMEN;BREAST;CHEST
LOCATION: ABDOMEN;CHEST;HEAD

## 2021-11-02 ASSESSMENT — PAIN DESCRIPTION - DESCRIPTORS: DESCRIPTORS_2: HEADACHE

## 2021-11-02 ASSESSMENT — PAIN SCALES - GENERAL
PAINLEVEL_OUTOF10: 8
PAINLEVEL_OUTOF10: 8

## 2021-11-02 NOTE — PROGRESS NOTES
Department of Internal Medicine  General Internal Medicine  Attending Progress Note  Chief Complaint   Patient presents with    Shortness of Breath     hx COPD and Asthma, states always SOB but worse today.  Cough     SUBJECTIVE:    Reports that she is feeling better this morning. Denied fever and chills. No chest pain. No nausea or vomiting. Noted that she still have severe headache.      OBJECTIVE      Medications    Current Facility-Administered Medications: cefTRIAXone (ROCEPHIN) 1,000 mg in sterile water 10 mL IV syringe, 1,000 mg, IntraVENous, Q24H  vancomycin (VANCOCIN) 1,000 mg in dextrose 5 % 250 mL IVPB, 1,000 mg, IntraVENous, Q24H  0.9 % sodium chloride bolus, 500 mL, IntraVENous, Once  benztropine (COGENTIN) tablet 1 mg, 1 mg, Oral, BID  busPIRone (BUSPAR) tablet 10 mg, 10 mg, Oral, BID  traZODone (DESYREL) tablet 50 mg, 50 mg, Oral, Nightly PRN  sodium chloride flush 0.9 % injection 5-40 mL, 5-40 mL, IntraVENous, 2 times per day  sodium chloride flush 0.9 % injection 5-40 mL, 5-40 mL, IntraVENous, PRN  0.9 % sodium chloride infusion, 25 mL, IntraVENous, PRN  enoxaparin (LOVENOX) injection 30 mg, 30 mg, SubCUTAneous, BID  ondansetron (ZOFRAN-ODT) disintegrating tablet 4 mg, 4 mg, Oral, Q8H PRN **OR** ondansetron (ZOFRAN) injection 4 mg, 4 mg, IntraVENous, Q6H PRN  polyethylene glycol (GLYCOLAX) packet 17 g, 17 g, Oral, Daily PRN  acetaminophen (TYLENOL) tablet 650 mg, 650 mg, Oral, Q6H PRN **OR** acetaminophen (TYLENOL) suppository 650 mg, 650 mg, Rectal, Q6H PRN  dexamethasone (DECADRON) injection 6 mg, 6 mg, IntraVENous, Q24H  Vitamin D (CHOLECALCIFEROL) tablet 2,000 Units, 2,000 Units, Oral, Daily  guaiFENesin-dextromethorphan (ROBITUSSIN DM) 100-10 MG/5ML syrup 5 mL, 5 mL, Oral, Q4H PRN  albuterol (PROVENTIL) nebulizer solution 2.5 mg, 2.5 mg, Nebulization, Q6H PRN  baricitinib (OLUMIANT) tablet 2 mg, 2 mg, Oral, Daily  lurasidone (LATUDA) tablet 40 mg, 40 mg, Oral, Daily  clonazePAM (Alejo Ortiz) tablet 1 mg, 1 mg, Oral, BID PRN  gabapentin (NEURONTIN) capsule 600 mg, 600 mg, Oral, BID  FLUoxetine (PROZAC) capsule 60 mg, 60 mg, Oral, Daily  albuterol-ipratropium (COMBIVENT RESPIMAT)  MCG/ACT inhaler 1 puff, 1 puff, Inhalation, Q6H  fluticasone (FLOVENT HFA) 110 MCG/ACT inhaler 1 puff, 1 puff, Inhalation, BID  Physical    VITALS:  BP (!) 84/57   Pulse 67   Temp 98.8 °F (37.1 °C) (Oral)   Resp 20   Wt 145 lb 1 oz (65.8 kg)   LMP  (LMP Unknown)   SpO2 96%   BMI 20.23 kg/m²   CONSTITUTIONAL:  awake and no apparent distress, appears cachectic. EYES:  extra-ocular muscles intact and vision intact  ENT:  atraumatic  NECK:  supple, symmetrical, trachea midline  BACK:  symmetric  LUNGS:  no increased work of breathing, no retractions and diminished breath sounds throughout lungs  CARDIOVASCULAR:  normal apical pulses and normal S1 and S2  ABDOMEN:  normal bowel sounds, soft and non-distended  MUSCULOSKELETAL:  there is no redness, warmth, or swelling of the joints  NEUROLOGIC:  Mental Status Exam:  Level of Alertness:   awake  SKIN:  no bruising or bleeding  Data    CBC:   Lab Results   Component Value Date    WBC 7.9 11/02/2021    RBC 3.99 11/02/2021    HGB 13.3 11/02/2021    HCT 40.5 11/02/2021    .5 11/02/2021    MCH 33.3 11/02/2021    MCHC 32.8 11/02/2021    RDW 13.7 11/02/2021     11/02/2021    MPV 11.4 11/02/2021     BMP:    Lab Results   Component Value Date     11/02/2021    K 4.0 11/02/2021     11/02/2021    CO2 25 11/02/2021    BUN 20 11/02/2021    LABALBU 3.6 11/02/2021    CREATININE 1.1 11/02/2021    CALCIUM 8.7 11/02/2021    GFRAA >60 11/02/2021    LABGLOM 51 11/02/2021    GLUCOSE 89 11/02/2021       ASSESSMENT AND PLAN      1. Acute respiratory failure with hypoxia (HCC)  Continue supplemental oxygen if needed  Patient currently on room air and saturating at 96% during evaluation  Continue to monitor    2.   COPD Exacerbation:  Continue breathing treatment with pulm hygiene    3. Covid 19 Viral infection:  Continue baricitinib and Decadron  Continue to monitor inflammatory markers  Tylenol for headache    4. Bacteremia: culture positive for GPC  Continue current abx. Await for final culture and sensitivity    5. Hypotension: ordered 1 time 500 ml of NS over 2 hours  Monitor BP. Suspects that patient may be chronically at low Bp as her normal    6. Malnutrition: suspects may be related to endstage copd. Ensure plus if patient is agreeable. Nutrition consult    7. WILLIE: improving    8.   Hx of Depression/Schizophrenia:  Continue home meds  PT/OT

## 2021-11-02 NOTE — PROGRESS NOTES
[facility]  [facility] [place]         Date:2021                                                   Patient Name: Jailyn Hawley     MRN: [id number]     : 1960     Room:        Evaluating OT: Joel Parekh, ESPERANZAR/L; [id number]       Referring Provider and Orders/Date:   OT eval and treat Start: 21, End: 21, ONE TIME, Standing Count: 1 Occurrences, R    Susan Lantigua MD     Diagnosis:   1. Acute respiratory failure with hypoxia ([facility]    2. COVID    3. WILLIE (acute kidney injury) [facility])         Pertinent Medical History:        Past Medical History:   Diagnosis Date    Alcoholism ([facility]     H/O    COPD (chronic obstructive pulmonary disease) ([facility]     Dental caries     Hypertension     no meds    Lung nodules      [place]     Schizophrenia ([facility]     stable    Ventricular hypokinesis     H/O          Past Surgical History:   Procedure Laterality Date    APPENDECTOMY       SECTION      2 C SECTION    ECHO COMPLETE  1/10/2014         HI DENTAL SURGERY PROCEDURE N/A 2018    MULTIPLE DENTAL EXTRACTIONS AND ALVELOPLASTY performed by Aniat Ambrocio DDS at [address] [place] [address] Right     WRIST SURGERY Right        Precautions:  Fall Risk, covid + with droplet    Recommended placement: home with     Assessment of current deficits     [x] Functional mobility  [x]ADLs  [x] Strength               []Cognition     [x] Functional transfers   [x] IADLs         [x] Safety Awareness   [x]Endurance     [] Fine Coordination              [x] Balance      [] Vision/perception   []Sensation      [x]Gross Motor Coordination  [] ROM  [] Delirium                   [] Motor Control     OT PLAN OF CARE   OT POC based on physician orders, patient diagnosis and results of clinical assessment    Frequency/Duration 1-3 days/wk for 2 weeks PRN   Specific OT Treatment Interventions to include:   * Instruction/training on adapted ADL techniques and AE recommendations to increase functional independence within precautions       * Training on energy conservation strategies, correct breathing pattern and techniques to improve independence/tolerance for self-care routine  * Functional transfer/mobility training/DME recommendations for increased independence, safety, and fall prevention  * Patient/Family education to increase follow through with safety techniques and functional independence  * Recommendation of environmental modifications for increased safety with functional transfers/mobility and ADLs  * Therapeutic exercise to improve motor endurance, ROM, and functional strength for ADLs/functional transfers  * Therapeutic activities to facilitate/challenge dynamic balance, stand tolerance for increased safety and independence with ADLs  * Therapeutic activities to facilitate gross/fine motor skills for increased independence with ADLs     Recommended Adaptive Equipment/DME: TBD      Home Living: Pt lives alone in an apartment with 3 steps. Son does live in a separate part of the apartment. Bathroom setup: tub shower with bars; standard toilet    DME owned: none     Prior Level of Function: indep with ADLs , assist with IADLs; ambulated indep   Driving: no   Occupation: none   Enjoys: grocery store, bike, walking, out to eat, going to the park with grandson, family    Pain Level: chronic in back and neck 5/10; nursing is aware per pt.    Cognition: A&O: 4/4; Follows 3 step directions   Memory:  Intact   Sequencing:  Intact   Problem solving:  Intact   Judgement/safety:  Intact    AM-PAC Daily Activity Inpatient   How much help for putting on and taking off regular lower body clothing?: A Little  How much help for Bathing?: A Little  How much help for Toileting?: A Little  How much help for putting on and taking off regular upper body clothing?: A Little  How much help for taking care of personal grooming?: A Little  How much help for eating meals?: None  AM-PAC Inpatient Daily Activity Raw Score: 19  AM-PAC Inpatient ADL T-Scale Score : 40.22  ADL Inpatient CMS 0-100% Score: 42.8  ADL Inpatient CMS G-Code Modifier : CK     Functional Assessment:     Initial Eval Status  Date: 2021   Treatment Status  Date: STGs = LTGs  Time frame: 10-14 days   Feeding Independent   NA-PLOF   Grooming Stand by Assist to wash hands in standing with O2 dropping to 91%   Independent    UB Dressing Stand by Assist with management of lines and impulsivity  Independent    LB Dressing Minimal Assist with socks and pants with balance and safety assist.   Independent    Bathing Stand by Assist with extended time with wipes sitting/standing from chair. Independent    Toileting Stand by Assist for safety and balance from 3:1  Independent    Bed Mobility  Supine to sit: Independent   Sit to supine: Independent     Not Appropriate-PLOF   Functional Transfers Stand by Assist without AD from bed, chair and 3:1  Independent    Functional Mobility Minimal Assist with loss of balance. Recommending cane for safety for short distance functional mobility throughout the room to simulate house hold distances. Independent    Balance Sitting:     Static:  good    Dynamic:fair+  Standing: fair  Sitting:     Dynamic:good  Standing: fair+   Activity Tolerance Vitals with activity: room air  Supine: 110/64 HR 73 98%  Standing : 83/72 HR 87 95%  Sittin/79 HR 78 95%  Standing with 1min tolerance. Increase standing tolerance for >4min with stable vital signs for carry over into toileting, functional tranfers and indep in ADLs   Visual/  Perceptual Glasses: not Present; WFL    Reports change in vision since admission: No     NA   Andrez UE Strengthening  4/5 generally  4+/5MMT generally for carry over into self care, functional transfers and functional mobility with AD.       Hand Dominance  [x] Right  [] Left AROM (PROM) Strength Additional Info:    RUE  WFL 4/5 good  and  FMC/dexterity noted during ADL tasks  Opposition [x] Intact [] Impaired  Finger to nose [x] Intact [] Impaired     LUE WFL 4/5 good  and FMC/dexterity noted during ADL tasks  Opposition [x] Intact [] Impaired  Finger to nose [x] Intact [] Impaired     Hearing: WFL   Sensation:  No c/o numbness or tingling   Tone: WFL   Edema: none    Comments: Upon arrival patient supine. Pt required SB A for most UB ADLs and min A LB ADLs tasks. Limited with min A for standing during LB ADLs and functional transfers. The biggest barriers reflect that of functional transfers, functional mobility, UB/LB ADLs, cognition, activity tolerance, balance, safety and strengthening. At end of session, patient supine with call light and phone within reach, all lines and tubes intact. Overall patient demonstrated decreased independence and safety during completion of ADL/functional transfer/mobility tasks. Nursing updated on pt position and status following OT eval. Pt would benefit from continued skilled OT to increase safety and independence with completion of ADL/IADL tasks for functional independence and quality of life. Treatment: OT treatment provided this date includes:   Instruction, education and training on safe facilitation and adapted techniques for completion of ADLs. These include neuromuscular reeducation to facilitate balance/righting reactions, safe functional transfer techniques and on energy conservation/work simplification for completion of ADLs. Education provided on hand/feet placement with bedrail, chair and body mechanics for fall prevention. Cues for energy conservation and safety for in the home at CT, including modifications and DME. Extended time to complete all tasks, including skilled monitoring of patient's response during treatment session and vital signs.  Prior to and at the end of session, environmental modifications / line management completed for patients safety and efficiency of treatment session. See above for further details. Rehab Potential: Good for established goals     Patient / Family Goal: Pain management      Patient and/or family were instructed on functional diagnosis, prognosis/goals and OT plan of care. Demonstrated good understanding. Eval Complexity: Low  · History: Brief review of medical records and additional review of physical, cognitive, or psychosocial history related to current functional performance  · Exam: 1-3 performance deficits  · Assistance/Modification: Min assistance or modifications required to perform tasks. May have comorbidities that affect occupational performance. Time In: 1446  Time Out: 1515  Total Treatment Time: 9    Min Units   OT Eval Low 97165  x  1   OT Eval Medium 50093      OT Eval High 11182      OT Re-Eval [id number]       Therapeutic Ex 70988       Therapeutic Activities 09096  9 1    ADL/Self Care 35813       Orthotic Management 04725       Manual [id number]     Neuro Re-Ed [id number]       Non-Billable Time          Evaluation Time additionally includes thorough review of current medical information, gathering information on past medical history/social history and prior level of function, interpretation of standardized testing/informal observation of tasks, assessment of data and development of plan of care and goals.             Lang Burkitt OTR/L; [id number]

## 2021-11-03 ENCOUNTER — APPOINTMENT (OUTPATIENT)
Dept: ULTRASOUND IMAGING | Age: 61
DRG: 137 | End: 2021-11-03
Payer: COMMERCIAL

## 2021-11-03 LAB
ALBUMIN SERPL-MCNC: 3.6 G/DL (ref 3.5–5.2)
ALP BLD-CCNC: 86 U/L (ref 35–104)
ALT SERPL-CCNC: 15 U/L (ref 0–32)
ANION GAP SERPL CALCULATED.3IONS-SCNC: 7 MMOL/L (ref 7–16)
AST SERPL-CCNC: 29 U/L (ref 0–31)
BILIRUB SERPL-MCNC: 0.2 MG/DL (ref 0–1.2)
BILIRUBIN DIRECT: <0.2 MG/DL (ref 0–0.3)
BILIRUBIN, INDIRECT: NORMAL MG/DL (ref 0–1)
BUN BLDV-MCNC: 20 MG/DL (ref 6–20)
C-REACTIVE PROTEIN: 2.4 MG/DL (ref 0–0.4)
CALCIUM SERPL-MCNC: 8.9 MG/DL (ref 8.6–10.2)
CHLORIDE BLD-SCNC: 100 MMOL/L (ref 98–107)
CO2: 27 MMOL/L (ref 22–29)
CREAT SERPL-MCNC: 1.1 MG/DL (ref 0.5–1)
D DIMER: 316 NG/ML DDU
FERRITIN: 136 NG/ML
GFR AFRICAN AMERICAN: >60
GFR NON-AFRICAN AMERICAN: 51 ML/MIN/1.73
GLUCOSE BLD-MCNC: 83 MG/DL (ref 74–99)
POTASSIUM SERPL-SCNC: 4.1 MMOL/L (ref 3.5–5)
SODIUM BLD-SCNC: 134 MMOL/L (ref 132–146)
TOTAL PROTEIN: 6.7 G/DL (ref 6.4–8.3)
URINE CULTURE, ROUTINE: NORMAL

## 2021-11-03 PROCEDURE — 87491 CHLMYD TRACH DNA AMP PROBE: CPT

## 2021-11-03 PROCEDURE — 86140 C-REACTIVE PROTEIN: CPT

## 2021-11-03 PROCEDURE — 76775 US EXAM ABDO BACK WALL LIM: CPT

## 2021-11-03 PROCEDURE — 87591 N.GONORRHOEAE DNA AMP PROB: CPT

## 2021-11-03 PROCEDURE — 36415 COLL VENOUS BLD VENIPUNCTURE: CPT

## 2021-11-03 PROCEDURE — 94640 AIRWAY INHALATION TREATMENT: CPT

## 2021-11-03 PROCEDURE — 97161 PT EVAL LOW COMPLEX 20 MIN: CPT | Performed by: PHYSICAL THERAPIST

## 2021-11-03 PROCEDURE — 97530 THERAPEUTIC ACTIVITIES: CPT | Performed by: PHYSICAL THERAPIST

## 2021-11-03 PROCEDURE — 85378 FIBRIN DEGRADE SEMIQUANT: CPT

## 2021-11-03 PROCEDURE — 80048 BASIC METABOLIC PNL TOTAL CA: CPT

## 2021-11-03 PROCEDURE — 99233 SBSQ HOSP IP/OBS HIGH 50: CPT | Performed by: INTERNAL MEDICINE

## 2021-11-03 PROCEDURE — 6370000000 HC RX 637 (ALT 250 FOR IP): Performed by: INTERNAL MEDICINE

## 2021-11-03 PROCEDURE — 80076 HEPATIC FUNCTION PANEL: CPT

## 2021-11-03 PROCEDURE — 6360000002 HC RX W HCPCS: Performed by: INTERNAL MEDICINE

## 2021-11-03 PROCEDURE — 82728 ASSAY OF FERRITIN: CPT

## 2021-11-03 PROCEDURE — 2060000000 HC ICU INTERMEDIATE R&B

## 2021-11-03 PROCEDURE — 2580000003 HC RX 258: Performed by: INTERNAL MEDICINE

## 2021-11-03 PROCEDURE — 6370000000 HC RX 637 (ALT 250 FOR IP): Performed by: SPECIALIST

## 2021-11-03 PROCEDURE — 93970 EXTREMITY STUDY: CPT

## 2021-11-03 RX ORDER — METRONIDAZOLE 500 MG/1
2000 TABLET ORAL ONCE
Status: COMPLETED | OUTPATIENT
Start: 2021-11-03 | End: 2021-11-03

## 2021-11-03 RX ORDER — 0.9 % SODIUM CHLORIDE 0.9 %
500 INTRAVENOUS SOLUTION INTRAVENOUS ONCE
Status: COMPLETED | OUTPATIENT
Start: 2021-11-03 | End: 2021-11-03

## 2021-11-03 RX ADMIN — LURASIDONE HYDROCHLORIDE 40 MG: 40 TABLET, FILM COATED ORAL at 10:23

## 2021-11-03 RX ADMIN — BUSPIRONE HYDROCHLORIDE 10 MG: 10 TABLET ORAL at 10:22

## 2021-11-03 RX ADMIN — BUSPIRONE HYDROCHLORIDE 10 MG: 10 TABLET ORAL at 20:23

## 2021-11-03 RX ADMIN — FLUOXETINE 60 MG: 20 CAPSULE ORAL at 10:22

## 2021-11-03 RX ADMIN — VANCOMYCIN HYDROCHLORIDE 1000 MG: 1 INJECTION, POWDER, LYOPHILIZED, FOR SOLUTION INTRAVENOUS at 13:03

## 2021-11-03 RX ADMIN — GABAPENTIN 600 MG: 300 CAPSULE ORAL at 10:22

## 2021-11-03 RX ADMIN — ACETAMINOPHEN 650 MG: 325 TABLET ORAL at 02:19

## 2021-11-03 RX ADMIN — ONDANSETRON 4 MG: 4 TABLET, ORALLY DISINTEGRATING ORAL at 02:19

## 2021-11-03 RX ADMIN — GUAIFENESIN AND DEXTROMETHORPHAN 5 ML: 100; 10 SYRUP ORAL at 12:36

## 2021-11-03 RX ADMIN — SODIUM CHLORIDE 500 ML: 9 INJECTION, SOLUTION INTRAVENOUS at 17:33

## 2021-11-03 RX ADMIN — Medication 10 ML: at 20:20

## 2021-11-03 RX ADMIN — BENZTROPINE MESYLATE 1 MG: 1 TABLET ORAL at 20:23

## 2021-11-03 RX ADMIN — ONDANSETRON 4 MG: 2 INJECTION INTRAMUSCULAR; INTRAVENOUS at 13:02

## 2021-11-03 RX ADMIN — BENZTROPINE MESYLATE 1 MG: 1 TABLET ORAL at 10:22

## 2021-11-03 RX ADMIN — ENOXAPARIN SODIUM 30 MG: 100 INJECTION SUBCUTANEOUS at 20:29

## 2021-11-03 RX ADMIN — ACETAMINOPHEN 650 MG: 325 TABLET ORAL at 12:36

## 2021-11-03 RX ADMIN — DEXAMETHASONE SODIUM PHOSPHATE 6 MG: 10 INJECTION INTRAMUSCULAR; INTRAVENOUS at 13:02

## 2021-11-03 RX ADMIN — Medication 10 ML: at 13:02

## 2021-11-03 RX ADMIN — GABAPENTIN 600 MG: 300 CAPSULE ORAL at 20:22

## 2021-11-03 RX ADMIN — IPRATROPIUM BROMIDE AND ALBUTEROL 1 PUFF: 20; 100 SPRAY, METERED RESPIRATORY (INHALATION) at 22:57

## 2021-11-03 RX ADMIN — ENOXAPARIN SODIUM 30 MG: 100 INJECTION SUBCUTANEOUS at 10:22

## 2021-11-03 RX ADMIN — BARICITINIB 2 MG: 2 TABLET, FILM COATED ORAL at 10:22

## 2021-11-03 RX ADMIN — Medication 2000 UNITS: at 10:22

## 2021-11-03 RX ADMIN — METRONIDAZOLE 2000 MG: 500 TABLET ORAL at 20:23

## 2021-11-03 ASSESSMENT — PAIN DESCRIPTION - PAIN TYPE
TYPE: ACUTE PAIN
TYPE_2: ACUTE PAIN
TYPE: ACUTE PAIN

## 2021-11-03 ASSESSMENT — PAIN DESCRIPTION - DESCRIPTORS: DESCRIPTORS_2: HEADACHE

## 2021-11-03 ASSESSMENT — PAIN DESCRIPTION - DIRECTION: RADIATING_TOWARDS_2: NECK

## 2021-11-03 ASSESSMENT — PAIN DESCRIPTION - LOCATION
LOCATION_2: HEAD
LOCATION: BACK;LEG
LOCATION: GENERALIZED

## 2021-11-03 ASSESSMENT — PAIN SCALES - GENERAL
PAINLEVEL_OUTOF10: 1
PAINLEVEL_OUTOF10: 3
PAINLEVEL_OUTOF10: 8
PAINLEVEL_OUTOF10: 0
PAINLEVEL_OUTOF10: 1

## 2021-11-03 ASSESSMENT — PAIN DESCRIPTION - INTENSITY: RATING_2: 10

## 2021-11-03 NOTE — PROGRESS NOTES
ml bolus finished. Patient stated she felt little dizzy and light-headed when standing but better than previous before getting NS Bolus. BP stable while lying sitting and standing.

## 2021-11-03 NOTE — CONSULTS
[facility]  Consult Note    [address] [facility] [address]  [place], [address]  Phone [phone]   [phone]      Admit Date: 2021  8:52 AM  Pt Name: Jose Anguiano  MRN: [id number]  : 1960  Reason for Consult:    Chief Complaint   Patient presents with    Shortness of Breath     hx COPD and Asthma, states always SOB but worse today.  Cough     Requesting Physician:  Rosaura Espino MD  PCP: CHICHO Burger - CNP  History Obtained From:  patient, chart   ID consulted for WILLIE (acute kidney injury) ([facility] [address] [N17.9]  Acute respiratory failure with hypoxia ([facility] [address] [J96.01]  COVID [U07.1]  on hospital day [address]       Chief Complaint   Patient presents with    Shortness of Breath     hx COPD and Asthma, states always SOB but worse today.  Cough     HISTORYOF PRESENT ILLNESS   Jose Anguiano is a 63 y.o. female who presents with   has a past medical history of Alcoholism ([facility] [address] COPD (chronic obstructive pulmonary disease) ([facility] [address] Dental caries, Hypertension, Lung nodules, Schizophrenia ([facility] [address] and Ventricular hypokinesis.    ED TRIAGEVITALS  BP: 129/76, Temp: 97.9 °F (36.6 °C), Pulse: 103, Resp: 20, SpO2: 96 %  HPI  PT CAME IN WITH COUGH FEVERS, HA CHEST PAIN AND ABD PAIN   PT WA SON 6L  PT IS CURRENTLY ON ROOM AIR  SHE DENIES EXPOSURE  WBC 9.5 CR1.2 COVID +  ID WAS ASKED TO SEE FOR BACTEREMIA CONS  CURRENTYL AFEBRILE  PT HAS ORTHOPEDIC  HARDWARE   PT IS SEXUALLY ACTIVE  PT HAS NO APPETITE DOES NOT WANT TO EAT   LIVES ALONE  REVIEW OF SYSTEMS     CONSTITUTIONAL:   No fever, chills, weight loss  ALLERGIES:    No urticaria, hay fever,    EYES:     No blurry vision, loss of vision, eye pain  ENT:      No hearing loss, sore throat  CARDIOVASCULAR:  No chest pain or palpitations  RESPIRATORY:   No cough, sob  ENDOCRINE:    No increase thirst, urination   HEME-LYMPH:   No easy bruising or bleeding  GI:     No nausea, vomiting or diarrhea  :     No urinary complaints  NEURO:    No seizures, stroke, HA  MUSCULOSKELETAL:    muscle aches or pain, no joint pain  SKIN:     No rash or itch  PSYCH:    No depression or anxiety    Medications Prior to Admission: clonazePAM (KLONOPIN) 2 MG tablet, Take 1 mg by mouth 2 times daily as needed for Anxiety. FLUoxetine (PROZAC) 40 MG capsule, Take 40 mg by mouth daily Take with Prozac 20 mg for a total dose of 60 mg daily. gabapentin (NEURONTIN) 600 MG tablet, Take 600 mg by mouth 2 times daily. traZODone (DESYREL) 50 MG tablet, Take  mg by mouth nightly as needed for Sleep   benztropine (COGENTIN) 1 MG tablet, Take 1 mg by mouth 2 times daily  lurasidone (LATUDA) 40 MG TABS tablet, Take 40 mg by mouth daily  FLUoxetine (PROZAC) 20 MG capsule, Take 20 mg by mouth daily Take with Prozac 40 mg for a total dose of 60 mg daily.   busPIRone (BUSPAR) 10 MG tablet, Take 10 mg by mouth 2 times daily   CURRENT MEDICATIONS     Current Facility-Administered Medications:     vancomycin (VANCOCIN) 1,000 mg in dextrose 5 % 250 mL IVPB, 1,000 mg, IntraVENous, Q24H, Apolinar Ames MD, Stopped at 11/02/21 1211    benztropine (COGENTIN) tablet 1 mg, 1 mg, Oral, BID, Apolinar Ames MD, 1 mg at 11/02/21 2217    busPIRone (BUSPAR) tablet 10 mg, 10 mg, Oral, BID, Apolinar Ames MD, 10 mg at 11/02/21 2217    traZODone (DESYREL) tablet 50 mg, 50 mg, Oral, Nightly PRN, Apolinar Ames MD    sodium chloride flush 0.9 % injection 5-40 mL, 5-40 mL, IntraVENous, 2 times per day, Apolinar Ames MD, 10 mL at 11/02/21 2151    sodium chloride flush 0.9 % injection 5-40 mL, 5-40 mL, IntraVENous, PRN, Apolinar Ames MD    0.9 % sodium chloride infusion, 25 mL, IntraVENous, PRN, Apolinar Ames MD    enoxaparin (LOVENOX) injection 30 mg, 30 mg, SubCUTAneous, BID, Frutoso MD Hui, 30 mg at 11/02/21 0951    ondansetron (ZOFRAN-ODT) disintegrating tablet 4 mg, 4 mg, Oral, Q8H PRN, 4 mg at 11/03/21 0219 **OR** ondansetron (ZOFRAN) injection 4 mg, 4 mg, IntraVENous, Q6H PRN, Mulu Garcia MD    polyethylene glycol (GLYCOLAX) packet 17 g, 17 g, Oral, Daily PRN, Mulu Garcia MD    acetaminophen (TYLENOL) tablet 650 mg, 650 mg, Oral, Q6H PRN, 650 mg at 11/03/21 0219 **OR** acetaminophen (TYLENOL) suppository 650 mg, 650 mg, Rectal, Q6H PRN, Mulu Garcia MD    dexamethasone (DECADRON) injection 6 mg, 6 mg, IntraVENous, Q24H, Mulu Garcia MD, 6 mg at 11/02/21 0950    Vitamin D (CHOLECALCIFEROL) tablet 2,000 Units, 2,000 Units, Oral, Daily, Mulu Garcia MD, 2,000 Units at 11/02/21 0952    guaiFENesin-dextromethorphan (ROBITUSSIN DM) 100-10 MG/5ML syrup 5 mL, 5 mL, Oral, Q4H PRN, Mulu Garcia MD    albuterol (PROVENTIL) nebulizer solution 2.5 mg, 2.5 mg, Nebulization, Q6H PRN, Mulu Garcia MD    baricitinib (OLUMIANT) tablet 2 mg, 2 mg, Oral, Daily, Mulu Garcia MD, 2 mg at 11/02/21 0951    lurasidone (LATUDA) tablet 40 mg, 40 mg, Oral, Daily, Mulu Garcia MD, 40 mg at 11/02/21 5253    clonazePAM (KLONOPIN) tablet 1 mg, 1 mg, Oral, BID PRN, Joie Dietz DO    gabapentin (NEURONTIN) capsule 600 mg, 600 mg, Oral, BID, Ke Kee DO, 600 mg at 11/02/21 0955    FLUoxetine (PROZAC) capsule 60 mg, 60 mg, Oral, Daily, Ke Kee DO, 60 mg at 11/02/21 0952    albuterol-ipratropium (COMBIVENT RESPIMAT)  MCG/ACT inhaler 1 puff, 1 puff, Inhalation, Q6H, Mulu Garcia MD, 1 puff at 11/02/21 1228    fluticasone (FLOVENT HFA) 110 MCG/ACT inhaler 1 puff, 1 puff, Inhalation, BID, Mulu Garcia MD, 1 puff at 11/02/21 2031  ALLERGIES     Codeine, Dust mite extract, Aspirin, and Penicillins  There is no immunization history for the selected administration types on file for this patient.    Internal Administration   First Dose      Second Dose           Last COVID Lab SARS-CoV-2, NAAT (no units)   Date Value 2021 DETECTED (A)            PAST MEDICAL HISTORY     Past Medical History:   Diagnosis Date    Alcoholism ([facility] [address]     H/O    COPD (chronic obstructive pulmonary disease) ([facility] [address]     Dental caries     Hypertension     no meds    Lung nodules      unaware.  Schizophrenia ([facility] [address]     stable    Ventricular hypokinesis     H/O     SURGICAL HISTORY       Past Surgical History:   Procedure Laterality Date    APPENDECTOMY       SECTION      2 C SECTION    ECHO COMPLETE  1/10/2014         WA DENTAL SURGERY PROCEDURE N/A 2018    MULTIPLE DENTAL EXTRACTIONS AND ALVELOPLASTY performed by Miles Singh DDS at [facility] ENDOSCOPY    SHOULDER SURGERY Right     WRIST SURGERY Right      FAMILY HISTORY       Family History   Problem Relation Age of Onset    Other Mother         schizophrenia    Cancer Father      SOCIAL HISTORY       Social History     Socioeconomic History    Marital status:      Spouse name: Not on file    Number of children: Not on file    Years of education: Not on file    Highest education level: Not on file   Occupational History    Not on file   Tobacco Use    Smoking status: Current Every Day Smoker     Packs/day: 1.00     Years: 42.00     Pack years: 42.00    Smokeless tobacco: Never Used   Vaping Use    Vaping Use: Never used   Substance and Sexual Activity    Alcohol use: Yes     Comment: was alcoholic stopped recent    Drug use: No     Types: Marijuana Stephenie Coil)     Comment: per     Sexual activity: Not Currently   Other Topics Concern    Not on file   Social History Narrative    Not on file     Social Determinants of Health     Financial Resource Strain:     Difficulty of Paying Living Expenses:    Food Insecurity:     Worried About Running Out of Food in the Last Year:     Ran Out of Food in the Last Year:    Transportation Needs:     Lack of Transportation (Medical):      Lack of Transportation (Non-Medical):    Physical Activity:     Days of Exercise per Week:     Minutes of Exercise per Session:    Stress:     Feeling of Stress :    Social Connections:     Frequency of Communication with Friends and Family:     Frequency of Social Gatherings with Friends and Family:     Attends Evangelical Services:     Active Member of Clubs or Organizations:     Attends Club or Organization Meetings:     Marital Status:    Intimate Partner Violence:     Fear of Current or Ex-Partner:     Emotionally Abused:     Physically Abused:     Sexually Abused:      · LIVES GIULIANA USED TO WORK [address]       ED Triage Vitals   BP Temp Temp Source Pulse Resp SpO2 Height Weight   11/01/21 0857 11/01/21 0857 11/01/21 0857 11/01/21 0857 11/01/21 0857 11/01/21 0857 11/02/21 2145 11/01/21 0915   (!) 149/84 103 °F (39.4 °C) Oral 133 26 100 % 5' 11\" (1.803 m) 145 lb 1 oz (65.8 kg)     Vitals:    Vitals:    11/02/21 2125 11/02/21 2145 11/02/21 2348 11/03/21 0200   BP: 125/66 126/66  129/76   Pulse: 66 66 60 103   Resp: 27 20  20   Temp: 98.2 °F (36.8 °C) 98.7 °F (37.1 °C)  97.9 °F (36.6 °C)   TempSrc: Oral Oral  Oral   SpO2:  97%  96%   Weight:  145 lb (65.8 kg)     Height:  5' 11\" (1.803 m)       Physical Exam   Constitutional/General: Alert and oriented, NAD THON   Head: NC/AT  Eyes: PERRL, EOMI  Mouth: Normal mucosa, no thrush   Neck: Supple, full ROM,    Pulmonary: Lungs DEC  to auscultation bilaterally. Not in respiratory distress  Cardiovascular:  Regular rate and rhythm, no murmurs, gallops, or rubs. Abdomen: Soft, + BS. No distension. Nontender. Extremities: Moves all extremities x 4.    Warm and well perfused  Pulses:  Distal pulses intact  Skin: Warm and dry without rash  Neurologic:    No focal deficits  Psych: Normal Affect     DIAGNOSTIC RESULTS   RADIOLOGY:   XR CHEST PORTABLE    Result Date: 11/1/2021  EXAMINATION: ONE XRAY VIEW OF THE CHEST 11/1/2021 8:22 am COMPARISON: 09/14/2021 HISTORY: [address] PROVIDED HISTORY: cough TECHNOLOGIST PROVIDED HISTORY: Reason for exam:->cough FINDINGS: Cardiomediastinal silhouette is unremarkable. No airspace infiltrate, effusion, or pneumothorax is seen. No acute osseous abnormality is identified. No radiographic evidence of acute cardiopulmonary disease process     LABS  Recent Labs     11/01/21  0914 11/02/21 0519   WBC 9.5 7.9   HGB 13.6 13.3   HCT 40.5 40.5   MCV 98.3 101.5*    207     Recent Labs     11/01/21  0914 11/01/21  0914 11/02/21 0519 11/02/21 0519 11/03/21 0518   *  --  133  --  134   K 3.8   < > 4.0  --  4.1   CL 94*   < > 100   < > 100   CO2 25   < > 25   < > 27   BUN 23*  --  20  --  20   CREATININE 1.2*  --  1.1*  --  1.1*   GFRAA 56  --  >60  --  >60   LABGLOM 46  --  51  --  51   GLUCOSE 95  --  89  --  83   PROT 7.2  --  6.9  --  6.7   LABALBU 3.8  --  3.6  --  3.6   CALCIUM 8.7  --  8.7  --  8.9   BILITOT 0.3  --  0.2  --  0.2   ALKPHOS 95  --  98  --  86   AST 22  --  23  --  29   ALT 11  --  13  --  15    < > = values in this interval not displayed.      Recent Labs     11/01/21 1126 11/02/21 0519   PROCAL 0.21* 0.30*     Lab Results   Component Value Date    CRP 6.2 (H) 11/02/2021    CRP 5.2 (H) 11/02/2021    CRP 8.9 (H) 11/01/2021     Lab Results   Component Value Date    SEDRATE 45 (H) 11/01/2021     No results found for: [id number]  Lab Results   Component Value Date    COVID19 DETECTED 11/01/2021     COVID-19/REBEKAH-COV2 LABS  Recent Labs     11/01/21  0914 11/01/21  1126 11/02/21  0204 11/02/21 0519 11/03/21 0518   CRP 8.9*  --  5.2* 6.2*  --    PROCAL  --  0.21*  --  0.30*  --    FERRITIN  --  113  --  142  --    LDH  --  173  --  198  --    DDIMER  --  402  --  603 316   FIBRINOGEN  --  >700*  --  >700*  --    INR  --   --   --  1.0  --    PROTIME  --   --   --  11.8  --    AST 22  --   --  23 29   ALT 11  --   --  13 15       MICROBIOLOGY:     Cultures :   Lab Results   Component Value Date    BC 24 Hours no growth 11/01/2021    BC 5 Days no growth 09/14/2021    BC 5 Days no growth 07/02/2021     Lab Results   Component Value Date    BLOODCULT2  11/01/2021     Gram stain performed from blood culture bottle media  Gram positive cocci in clusters      BLOODCULT2 5 Days no growth 09/14/2021    BLOODCULT2 5 Days no growth 07/02/2021       No results found for: WNDABS    Smear, Respiratory   Date Value Ref Range Status   07/03/2021   Final    Group 5: >25 PMN's/LPF and <10 Epithelial cells/LPF  Polymorphonuclear leukocytes not seen  Few Epithelial cells  No organisms seen       No results found for: MPNEUMO, CLAMYDCU, LABLEGI, AFBCX, FUNGSM, LABFUNG  CULTURE, RESPIRATORY   Date Value Ref Range Status   07/03/2021 Oral Pharyngeal Kathleen reduced  Final     No results found for: CXCATHTIP  No results found for: BFCS  No results found for: CXSURG  Urine Culture, Routine   Date Value Ref Range Status   11/01/2021 <10,000 CFU/mL  Mixed gram positive organisms    Preliminary        FINAL IMPRESSION    Patient is a 63 y.o. female who presented with   Chief Complaint   Patient presents with    Shortness of Breath     hx COPD and Asthma, states always SOB but worse today.  Cough    and admitted for WILLIE (acute kidney injury) ([facility] [address] [N17.9]  Acute respiratory failure with hypoxia (HCC) [J96.01]  COVID [U07.1] PT WAS ON 6L WEANED TO RA  fevers  Gram positive cocci in clusters looks CONS  Urine GPO WITH TRICHOMONIASIS     REPEAT BLOOD CX  CHECK URINE GC/CHLAMYDIA   HIV/HEP C    vancomycin (VANCOCIN) 1,000 mg in dextrose 5 % 250 mL IVPB, Q24H   ADD FLAGYL  baricitinib (OLUMIANT) tablet 2 mg, Daily     DISCUSSED PRONING AND SIDE POSTIONING       Imaging and labs were reviewed per medical records and any ID pertinent labs were addressed with the patient. The patient/FAMILY  was educated about the diagnosis, prognosis, indications, risks and benefits of treatment.       An opportunity to ask questions was given to the patient/FAMILY and questions were answered. Thank you for involving me in the care of [facility]. Please do not hesitate to call for any questions or concerns.          Electronically signed by Satish Mireles MD on 11/3/2021 at 7:39 AM

## 2021-11-03 NOTE — CARE COORDINATION
CM Note: 11/3/2021 at 1:11pm: COVID POSITIVE - test done on 11/1. CM called and talked to the patient on the phone in her room. States she lives in a one floor efficiency apartment with 3-4 steps to enter. States she is pretty independent with her ADL's. She states her son Minh Greco helps at times. She also follows with [facility] and her CM is [employer]. She states Kaitlyn Hinders and also a neighbor assists her with shopping and transportation. Currently on RA - no home O2 or nebulizer. DME: cane, walker which she uses at times.  of [facility] and has been to [facility] in the past. PCP: Wen VALENTINO-CNP . Pharmacy: [facility] on [address] [place] [address] and also gets some of her meds at [facility]. States her plan is to go home when discharged and unsure of who will pick her up. Per chart review, she has positive blood cultures and ID consulted. Will need to watch for possible home antibiotics. PT / OT has been consulted - await their recommendations.  Ti Vann RN

## 2021-11-03 NOTE — ACP (ADVANCE CARE PLANNING)
Advance Care Planning     Advance Care Planning Activator (Inpatient)  Conversation Note      Date of ACP Conversation: 11/3/2021     Conversation Conducted with:Patient  ACP Activator: Meredith Gowers, RN    Health Care Decision Maker:     Current Designated Health Care Decision Maker:   Patient very adamant about not having a primary decision maker. States \"I don't want one - I make my own decisions\"    Care Preferences    Ventilation: \"If you were in your present state of health and suddenly became very ill and were unable to breathe on your own, what would your preference be about the use of a ventilator (breathing machine) if it were available to you? \"      Would the patient desire the use of ventilator (breathing machine)?: no    \"If your health worsens and it becomes clear that your chance of recovery is unlikely, what would your preference be about the use of a ventilator (breathing machine) if it were available to you? \"     Would the patient desire the use of ventilator (breathing machine)?: No      Resuscitation  \"CPR works best to restart the heart when there is a sudden event, like a heart attack, in someone who is otherwise healthy. Unfortunately, CPR does not typically restart the heart for people who have serious health conditions or who are very sick. \"    \"In the event your heart stopped as a result of an underlying serious health condition, would you want attempts to be made to restart your heart (answer \"yes\" for attempt to resuscitate) or would you prefer a natural death (answer \"no\" for do not attempt to resuscitate)? \" no     Patient's nurse and  notified of the answers to the above questions. [] Yes   [x] No   Educated Patient / [facility] regarding differences between Advance Directives and portable DNR orders.     Length of ACP Conversation in minutes:      Conversation Outcomes:  [x] ACP discussion completed  [] Existing advance directive reviewed with patient; no changes to patient's previously recorded wishes  [] New Advance Directive completed  [] Portable Do Not Rescitate prepared for Provider review and signature  [] POLST/POST/MOLST/MOST prepared for Provider review and signature      Follow-up plan:    [] Schedule follow-up conversation to continue planning  [] Referred individual to Provider for additional questions/concerns   [] Advised patient/agent/surrogate to review completed ACP document and update if needed with changes in condition, patient preferences or care setting    [x] This note routed to one or more involved healthcare providers

## 2021-11-03 NOTE — PROGRESS NOTES
Physical Therapy    Physical Therapy Initial Evaluation/Plan of Care    Room #:  0282/0500-48  Patient Name: Sveta Mathis  YOB: 1960  MRN: [id number]    Date of Service: 11/3/2021     Tentative placement recommendation: Subacute vs Home Health Physical Therapy if patient meets goals  Equipment recommendation: To be determined      Evaluating Physical Therapist: Josette Barajas  #[id number]      Specific Provider Orders/Date/Referring Provider :  11/01/21 1815   PT evaluation and treat Start: 11/01/21 1815, End: 11/01/21 1815, ONE TIME, Standing Count: 1 Occurrences, Christa Rockwell MD      Admitting Diagnosis:   WILLIE (acute kidney injury) ([facility] [address] [N17.9]  Acute respiratory failure with hypoxia ([address] [J96.01]  COVID [U07.1]       Surgery: none  Visit Diagnoses       Codes    COVID     U07.1          Patient Active Problem List   Diagnosis    Chronic pain of right knee    Osteoarthritis of spine with radiculopathy, cervical region    Cervical disc disorder    Cervical radiculopathy    Chronic pain syndrome    Acute pain of right knee    Acute respiratory failure with hypoxia and hypercapnia (HCC)    CAP (community acquired pneumonia)    COPD exacerbation ([address]    Depression    Acute exacerbation of chronic obstructive airways disease ([address]    Respiratory distress    WILLIE (acute kidney injury) ([address]    Schizophrenia ([address]    Normal anion gap metabolic acidosis    Acute respiratory failure with hypoxia (HCC)        ASSESSMENT of Current Deficits Patient exhibits decreased strength, balance, endurance and coordination impairing functional mobility, transfers, gait , gait distance and tolerance to activity are barriers to d/c and require skilled intervention during hospital stay to attain pre hospital level of function. Decreased balance, stmina for gait, and safety awareness   increases patient's risk for fall.          PHYSICAL THERAPY  PLAN OF CARE       Physical therapy plan of care is established based on physician order,  patient diagnosis and clinical assessment    Current Treatment Recommendations:    -Standing Balance: Perform strengthening exercises in standing to promote motor control with or without upper extremity support   -Transfers: Provide instruction on proper hand and foot position for adequate transfer of weight onto lower extremities and use of gait device if needed and Cues for hand placement, technique and safety. Provide stabilization to prevent fall   -Gait: Gait training and Standing activities to improve: base of support, weight shift, weight bearing    -Endurance: Utilize Supervised activities to increase level of endurance to allow for safe functional mobility including transfers and gait   -Stairs: Stair training with instruction on proper technique and hand placement on rail    PT long term treatment goals are located in below grid    Patient and or family understand(s) diagnosis, prognosis, and plan of care. Frequency of treatments: Patient will be seen  daily. Prior Level of Function: Patient ambulated independently    Rehab Potential: good    for baseline    Past medical history:   Past Medical History:   Diagnosis Date    Alcoholism ([facility] [address]     H/O    COPD (chronic obstructive pulmonary disease) ([facility] [address]     Dental caries     Hypertension     no meds    Lung nodules      unaware.     Schizophrenia ([facility] [address]     stable    Ventricular hypokinesis     H/O     Past Surgical History:   Procedure Laterality Date    APPENDECTOMY       SECTION      2 C SECTION    ECHO COMPLETE  1/10/2014         [place] DENTAL SURGERY PROCEDURE N/A 2018    MULTIPLE DENTAL EXTRACTIONS AND ALVELOPLASTY performed by Mike Castleman, DDS at [facility] ENDOSCOPY    SHOULDER SURGERY Right     WRIST SURGERY Right        SUBJECTIVE:    Precautions: Up as tolerated, falls, alarm and Droplet plus/COVID-19 ,    Social history: Patient lives with son in a apartment with 3 steps  to enter      Tub shower grab bars    Equipment owned: None,       [id number]  Mobility Inpatient   How much difficulty turning over in bed?: None  How much difficulty sitting down on / standing up from a chair with arms?: A Lot  How much difficulty moving from lying on back to sitting on side of bed?: A Little  How much help from another person moving to and from a bed to a chair?: A Lot  How much help from another person needed to walk in hospital room?: A Lot  How much help from another person for climbing 3-5 steps with a railing?: A Lot  AM-PAC Inpatient Mobility Raw Score : 15  AM-PAC Inpatient T-Scale Score : 39.45  Mobility Inpatient CMS 0-100% Score: 57.7  Mobility Inpatient CMS G-Code Modifier : CK    Nursing cleared patient for PT evaluation. The admitting diagnosis and active problem list as listed above have been reviewed prior to the initiation of this evaluation. OBJECTIVE;   Initial Evaluation  Date: 11/3/2021 Treatment Date:     Short Term/ Long Term   Goals   Was pt agreeable to Eval/treatment? Yes  To be met in 4 days   Pain level   0/10        Bed Mobility    Rolling: Independent    Supine to sit:  Independent    Sit to supine: Independent    Scooting: Independent        Transfers Sit to stand: Minimal assist of 1 x 3 reps   Sit to stand: Independent     Ambulation    40 feet using  IV pole with Minimal assist of 1   for balance, upright, multiplane instability, safety and dizziness, Patient with shuffling steps and flexed posture and cues for upright posture and safety   100 feet using  wheeled walker with Supervision     Stair negotiation: ascended and descended   Not assessed     3 steps with rail supervision    ROM Within functional limits        Strength BUE:  refer to OT eval  RLE:  4/5  LLE:  4/5  Increase strength in affected mm groups by 1/3 grade   Balance Sitting EOB:  fair  impulsive  Dynamic Standing:  poor requires bilateral hand support, dizzy, hypotensive  Sitting EOB:  good    Dynamic Standing: good with wheeled walker      Patient is Alert & Oriented x person, place, time and situation and follows one step directions  Anxious, impulsive  Sensation:  Patient  denies numbness/tingling   Edema:  no   Endurance: poor  Tolerance to upright with dizziness and elevated hr    Vitals: room air   Blood Pressure at rest  Blood Pressure during session 98/60   Heart Rate at rest 121 Heart Rate during session 132   SPO2 at rest 96%  SPO2 during session 93%     Patient education  Patient educated on role of Physical Therapy, risks of immobility, safety and plan of care, importance of positional changes for oxygen exchange,  importance of mobility while in hospital  and safety      Patient response to education:   Pt verbalized understanding Pt demonstrated skill Pt requires further education in this area   Yes Partial Yes      Treatment:  Patient practiced and was instructed/facilitated in the following treatment: Patient   Sat edge of bed 15 minutes with Supervision  to increase dynamic sitting balance and activity tolerance. Therapeutic Exercises:  not performed         At end of session, patient in bed with alarm call light and phone within reach,  all lines and tubes intact, nursing notified. Patient would benefit from continued skilled Physical Therapy to improve functional independence and quality of life. Patient's/ family goals   home    Time in  46  Time out  439    Total Treatment Time  10 minutes    Evaluation time includes thorough review of current medical information, gathering information on past medical history/social history and prior level of function, completion of standardized testing/informal observation of tasks, assessment of data, and development of Plan of care and goals.      CPT codes:  Low Complexity PT evaluation (05474)  Therapeutic activities (57969)   10 minutes  1 unit(s)    Charleen Morrow, PT

## 2021-11-03 NOTE — PROGRESS NOTES
Department of Internal Medicine  General Internal Medicine  Attending Progress Note  Chief Complaint   Patient presents with    Shortness of Breath     hx COPD and Asthma, states always SOB but worse today.  Cough     SUBJECTIVE:    Reports that she is feeling better today. Denied fever and chills. No chest pain. Noted improved breathing. She is aware that her blood culture was positive for bacteria and she will need to see ID for this.      OBJECTIVE      Medications    Current Facility-Administered Medications: vancomycin (VANCOCIN) 1,000 mg in dextrose 5 % 250 mL IVPB, 1,000 mg, IntraVENous, Q24H  benztropine (COGENTIN) tablet 1 mg, 1 mg, Oral, BID  busPIRone (BUSPAR) tablet 10 mg, 10 mg, Oral, BID  traZODone (DESYREL) tablet 50 mg, 50 mg, Oral, Nightly PRN  sodium chloride flush 0.9 % injection 5-40 mL, 5-40 mL, IntraVENous, 2 times per day  sodium chloride flush 0.9 % injection 5-40 mL, 5-40 mL, IntraVENous, PRN  0.9 % sodium chloride infusion, 25 mL, IntraVENous, PRN  enoxaparin (LOVENOX) injection 30 mg, 30 mg, SubCUTAneous, BID  ondansetron (ZOFRAN-ODT) disintegrating tablet 4 mg, 4 mg, Oral, Q8H PRN **OR** ondansetron (ZOFRAN) injection 4 mg, 4 mg, IntraVENous, Q6H PRN  polyethylene glycol (GLYCOLAX) packet 17 g, 17 g, Oral, Daily PRN  acetaminophen (TYLENOL) tablet 650 mg, 650 mg, Oral, Q6H PRN **OR** acetaminophen (TYLENOL) suppository 650 mg, 650 mg, Rectal, Q6H PRN  dexamethasone (DECADRON) injection 6 mg, 6 mg, IntraVENous, Q24H  Vitamin D (CHOLECALCIFEROL) tablet 2,000 Units, 2,000 Units, Oral, Daily  guaiFENesin-dextromethorphan (ROBITUSSIN DM) 100-10 MG/5ML syrup 5 mL, 5 mL, Oral, Q4H PRN  albuterol (PROVENTIL) nebulizer solution 2.5 mg, 2.5 mg, Nebulization, Q6H PRN  baricitinib (OLUMIANT) tablet 2 mg, 2 mg, Oral, Daily  lurasidone (LATUDA) tablet 40 mg, 40 mg, Oral, Daily  clonazePAM (KLONOPIN) tablet 1 mg, 1 mg, Oral, BID PRN  gabapentin (NEURONTIN) capsule 600 mg, 600 mg, Oral, BID  FLUoxetine (PROZAC) capsule 60 mg, 60 mg, Oral, Daily  albuterol-ipratropium (COMBIVENT RESPIMAT)  MCG/ACT inhaler 1 puff, 1 puff, Inhalation, Q6H  fluticasone (FLOVENT HFA) 110 MCG/ACT inhaler 1 puff, 1 puff, Inhalation, BID  Physical    VITALS:  /76   Pulse 103   Temp 97.9 °F (36.6 °C) (Oral)   Resp 20   Ht 5' 11\" (1.803 m)   Wt 145 lb (65.8 kg)   LMP  (LMP Unknown)   SpO2 96%   BMI 20.22 kg/m²   CONSTITUTIONAL:  cachectic  EYES:  pupils equal, round and reactive to light  ENT:  normocepalic, without obvious abnormality  BACK:  symmetric  LUNGS:  no increased work of breathing, no retractions and clear to auscultation  CARDIOVASCULAR:  tachycardic with regular rhythm  ABDOMEN:  normal bowel sounds, soft and non-distended  MUSCULOSKELETAL:  there is no redness, warmth, or swelling of the joints  NEUROLOGIC:  Mental Status Exam:  Level of Alertness:   awake  Orientation:   person, place, time  SKIN:  no bruising or bleeding  Data    CBC:   Lab Results   Component Value Date    WBC 7.9 11/02/2021    RBC 3.99 11/02/2021    HGB 13.3 11/02/2021    HCT 40.5 11/02/2021    .5 11/02/2021    MCH 33.3 11/02/2021    MCHC 32.8 11/02/2021    RDW 13.7 11/02/2021     11/02/2021    MPV 11.4 11/02/2021     BMP:    Lab Results   Component Value Date     11/03/2021    K 4.1 11/03/2021    K 4.0 11/02/2021     11/03/2021    CO2 27 11/03/2021    BUN 20 11/03/2021    LABALBU 3.6 11/03/2021    CREATININE 1.1 11/03/2021    CALCIUM 8.9 11/03/2021    GFRAA >60 11/03/2021    LABGLOM 51 11/03/2021    GLUCOSE 83 11/03/2021       ASSESSMENT AND PLAN        1. Acute respiratory failure with hypoxia (HCC)  Much improved and no longer on oxygen  Continue to monitor  Ambulatory pulse oxygen check before discharge    2. Bacteremia: possible MRSA per PCR  Consulted ID  May be contaminant  Discontinue Ceftriaxone, continue Vanco  Check trough with 4th dose    3.   COPD with exacerbation: much improved  Continue to monitor    4. Depression/Schizophrenia:  latuda and Prozac    5. Covid 19 Viral Infection:  Headache seems better  Continue tylenol  Continue baricitinib  Much improved inflammatory markers    6. Malnutrition: add ensure plus. This is suspected to be related to possible COPD.

## 2021-11-03 NOTE — PROGRESS NOTES
Patient on room air at rest, pulse ox 95%. Patient ambulated on room air, pulse ox stayed above 94% but HR in 120+ and patient complaint of dizziness. BP 98/60. Notified Dr. Jennifer Quinones and received order  ml bolus over 1 hour.

## 2021-11-03 NOTE — PROGRESS NOTES
Nurse to nurse received from [facility] in ED at this time. Patient to arrive to room 18-2.

## 2021-11-04 LAB
ALBUMIN SERPL-MCNC: 3.5 G/DL (ref 3.5–5.2)
ALP BLD-CCNC: 76 U/L (ref 35–104)
ALT SERPL-CCNC: 13 U/L (ref 0–32)
ANION GAP SERPL CALCULATED.3IONS-SCNC: 4 MMOL/L (ref 7–16)
AST SERPL-CCNC: 20 U/L (ref 0–31)
B.E.: 1.2 MMOL/L (ref -3–3)
BASOPHILS ABSOLUTE: 0 E9/L (ref 0–0.2)
BASOPHILS RELATIVE PERCENT: 0 % (ref 0–2)
BILIRUB SERPL-MCNC: 0.2 MG/DL (ref 0–1.2)
BUN BLDV-MCNC: 17 MG/DL (ref 6–20)
CALCIUM SERPL-MCNC: 9 MG/DL (ref 8.6–10.2)
CHLORIDE BLD-SCNC: 101 MMOL/L (ref 98–107)
CO2: 31 MMOL/L (ref 22–29)
COHB: 0.3 % (ref 0–1.5)
CREAT SERPL-MCNC: 1 MG/DL (ref 0.5–1)
CRITICAL: ABNORMAL
DATE ANALYZED: ABNORMAL
DATE OF COLLECTION: ABNORMAL
EOSINOPHILS ABSOLUTE: 0 E9/L (ref 0.05–0.5)
EOSINOPHILS RELATIVE PERCENT: 0 % (ref 0–6)
GFR AFRICAN AMERICAN: >60
GFR NON-AFRICAN AMERICAN: 57 ML/MIN/1.73
GLUCOSE BLD-MCNC: 80 MG/DL (ref 74–99)
HCO3: 25.7 MMOL/L (ref 22–26)
HCT VFR BLD CALC: 38.8 % (ref 34–48)
HEMOGLOBIN: 12.2 G/DL (ref 11.5–15.5)
HHB: 6.4 % (ref 0–5)
LAB: ABNORMAL
LYMPHOCYTES ABSOLUTE: 0.7 E9/L (ref 1.5–4)
LYMPHOCYTES RELATIVE PERCENT: 24 % (ref 20–42)
Lab: ABNORMAL
MCH RBC QN AUTO: 32.3 PG (ref 26–35)
MCHC RBC AUTO-ENTMCNC: 31.4 % (ref 32–34.5)
MCV RBC AUTO: 102.6 FL (ref 80–99.9)
METHB: 0.3 % (ref 0–1.5)
MODE: ABNORMAL
MONOCYTES ABSOLUTE: 0.29 E9/L (ref 0.1–0.95)
MONOCYTES RELATIVE PERCENT: 10 % (ref 2–12)
NEUTROPHILS ABSOLUTE: 1.91 E9/L (ref 1.8–7.3)
NEUTROPHILS RELATIVE PERCENT: 66 % (ref 43–80)
O2 CONTENT: 16.8 ML/DL
O2 SATURATION: 93.6 % (ref 92–98.5)
O2HB: 93 % (ref 94–97)
OPERATOR ID: 1102
PATIENT TEMP: 37 C
PCO2: 40.2 MMHG (ref 35–45)
PDW BLD-RTO: 13.7 FL (ref 11.5–15)
PH BLOOD GAS: 7.42 (ref 7.35–7.45)
PLATELET # BLD: 203 E9/L (ref 130–450)
PMV BLD AUTO: 11.2 FL (ref 7–12)
PO2: 69.5 MMHG (ref 75–100)
POTASSIUM SERPL-SCNC: 4.2 MMOL/L (ref 3.5–5)
PROCALCITONIN: 0.11 NG/ML (ref 0–0.08)
RBC # BLD: 3.78 E12/L (ref 3.5–5.5)
SODIUM BLD-SCNC: 136 MMOL/L (ref 132–146)
SOURCE, BLOOD GAS: ABNORMAL
THB: 12.8 G/DL (ref 11.5–16.5)
TIME ANALYZED: 1520
TOTAL PROTEIN: 6.5 G/DL (ref 6.4–8.3)
WBC # BLD: 2.9 E9/L (ref 4.5–11.5)

## 2021-11-04 PROCEDURE — 2060000000 HC ICU INTERMEDIATE R&B

## 2021-11-04 PROCEDURE — 82805 BLOOD GASES W/O2 SATURATION: CPT

## 2021-11-04 PROCEDURE — 99232 SBSQ HOSP IP/OBS MODERATE 35: CPT | Performed by: INTERNAL MEDICINE

## 2021-11-04 PROCEDURE — 6360000002 HC RX W HCPCS: Performed by: INTERNAL MEDICINE

## 2021-11-04 PROCEDURE — 87070 CULTURE OTHR SPECIMN AEROBIC: CPT

## 2021-11-04 PROCEDURE — 97530 THERAPEUTIC ACTIVITIES: CPT

## 2021-11-04 PROCEDURE — 80053 COMPREHEN METABOLIC PANEL: CPT

## 2021-11-04 PROCEDURE — 94640 AIRWAY INHALATION TREATMENT: CPT

## 2021-11-04 PROCEDURE — 84145 PROCALCITONIN (PCT): CPT

## 2021-11-04 PROCEDURE — 2580000003 HC RX 258: Performed by: INTERNAL MEDICINE

## 2021-11-04 PROCEDURE — 87040 BLOOD CULTURE FOR BACTERIA: CPT

## 2021-11-04 PROCEDURE — 6370000000 HC RX 637 (ALT 250 FOR IP): Performed by: INTERNAL MEDICINE

## 2021-11-04 PROCEDURE — 87206 SMEAR FLUORESCENT/ACID STAI: CPT

## 2021-11-04 PROCEDURE — 36415 COLL VENOUS BLD VENIPUNCTURE: CPT

## 2021-11-04 PROCEDURE — 85025 COMPLETE CBC W/AUTO DIFF WBC: CPT

## 2021-11-04 PROCEDURE — 86803 HEPATITIS C AB TEST: CPT

## 2021-11-04 PROCEDURE — 86703 HIV-1/HIV-2 1 RESULT ANTBDY: CPT

## 2021-11-04 RX ORDER — NICOTINE 21 MG/24HR
1 PATCH, TRANSDERMAL 24 HOURS TRANSDERMAL DAILY
Status: DISCONTINUED | OUTPATIENT
Start: 2021-11-05 | End: 2021-11-11 | Stop reason: HOSPADM

## 2021-11-04 RX ADMIN — Medication 10 ML: at 09:35

## 2021-11-04 RX ADMIN — TRAZODONE HYDROCHLORIDE 50 MG: 50 TABLET ORAL at 22:56

## 2021-11-04 RX ADMIN — IPRATROPIUM BROMIDE AND ALBUTEROL 1 PUFF: 20; 100 SPRAY, METERED RESPIRATORY (INHALATION) at 11:17

## 2021-11-04 RX ADMIN — Medication 10 ML: at 20:36

## 2021-11-04 RX ADMIN — ENOXAPARIN SODIUM 30 MG: 100 INJECTION SUBCUTANEOUS at 20:40

## 2021-11-04 RX ADMIN — FLUTICASONE PROPIONATE 1 PUFF: 110 AEROSOL, METERED RESPIRATORY (INHALATION) at 18:31

## 2021-11-04 RX ADMIN — GABAPENTIN 600 MG: 300 CAPSULE ORAL at 09:34

## 2021-11-04 RX ADMIN — BUSPIRONE HYDROCHLORIDE 10 MG: 10 TABLET ORAL at 09:32

## 2021-11-04 RX ADMIN — IPRATROPIUM BROMIDE AND ALBUTEROL 1 PUFF: 20; 100 SPRAY, METERED RESPIRATORY (INHALATION) at 23:29

## 2021-11-04 RX ADMIN — FLUTICASONE PROPIONATE 1 PUFF: 110 AEROSOL, METERED RESPIRATORY (INHALATION) at 06:54

## 2021-11-04 RX ADMIN — LURASIDONE HYDROCHLORIDE 40 MG: 40 TABLET, FILM COATED ORAL at 09:33

## 2021-11-04 RX ADMIN — DEXAMETHASONE SODIUM PHOSPHATE 6 MG: 10 INJECTION INTRAMUSCULAR; INTRAVENOUS at 09:35

## 2021-11-04 RX ADMIN — FLUOXETINE 60 MG: 20 CAPSULE ORAL at 09:34

## 2021-11-04 RX ADMIN — IPRATROPIUM BROMIDE AND ALBUTEROL 1 PUFF: 20; 100 SPRAY, METERED RESPIRATORY (INHALATION) at 06:53

## 2021-11-04 RX ADMIN — VANCOMYCIN HYDROCHLORIDE 1000 MG: 1 INJECTION, POWDER, LYOPHILIZED, FOR SOLUTION INTRAVENOUS at 10:30

## 2021-11-04 RX ADMIN — GABAPENTIN 600 MG: 300 CAPSULE ORAL at 20:47

## 2021-11-04 RX ADMIN — ENOXAPARIN SODIUM 30 MG: 100 INJECTION SUBCUTANEOUS at 09:32

## 2021-11-04 RX ADMIN — BENZTROPINE MESYLATE 1 MG: 1 TABLET ORAL at 09:34

## 2021-11-04 RX ADMIN — BARICITINIB 2 MG: 2 TABLET, FILM COATED ORAL at 09:34

## 2021-11-04 RX ADMIN — BENZTROPINE MESYLATE 1 MG: 1 TABLET ORAL at 20:40

## 2021-11-04 RX ADMIN — Medication 2000 UNITS: at 09:34

## 2021-11-04 RX ADMIN — BUSPIRONE HYDROCHLORIDE 10 MG: 10 TABLET ORAL at 20:40

## 2021-11-04 RX ADMIN — IPRATROPIUM BROMIDE AND ALBUTEROL 1 PUFF: 20; 100 SPRAY, METERED RESPIRATORY (INHALATION) at 18:30

## 2021-11-04 ASSESSMENT — ENCOUNTER SYMPTOMS
COUGH: 1
SHORTNESS OF BREATH: 1

## 2021-11-04 ASSESSMENT — PAIN SCALES - GENERAL
PAINLEVEL_OUTOF10: 0
PAINLEVEL_OUTOF10: 0

## 2021-11-04 NOTE — PROGRESS NOTES
No urinary complaints  NEURO:    No seizures, stroke, HA  MUSCULOSKELETAL:    muscle aches or pain, no joint pain  SKIN:     No rash or itch  PSYCH:    No depression or anxiety    Medications Prior to Admission: clonazePAM (KLONOPIN) 2 MG tablet, Take 1 mg by mouth 2 times daily as needed for Anxiety. FLUoxetine (PROZAC) 40 MG capsule, Take 40 mg by mouth daily Take with Prozac 20 mg for a total dose of 60 mg daily. gabapentin (NEURONTIN) 600 MG tablet, Take 600 mg by mouth 2 times daily. traZODone (DESYREL) 50 MG tablet, Take  mg by mouth nightly as needed for Sleep   benztropine (COGENTIN) 1 MG tablet, Take 1 mg by mouth 2 times daily  lurasidone (LATUDA) 40 MG TABS tablet, Take 40 mg by mouth daily  FLUoxetine (PROZAC) 20 MG capsule, Take 20 mg by mouth daily Take with Prozac 40 mg for a total dose of 60 mg daily.   busPIRone (BUSPAR) 10 MG tablet, Take 10 mg by mouth 2 times daily   CURRENT MEDICATIONS     Current Facility-Administered Medications:     vancomycin (VANCOCIN) 1,000 mg in dextrose 5 % 250 mL IVPB, 1,000 mg, IntraVENous, Q24H, Antionette Alarcon MD, Stopped at 11/04/21 1135    benztropine (COGENTIN) tablet 1 mg, 1 mg, Oral, BID, Antionette Alarcon MD, 1 mg at 11/04/21 0934    busPIRone (BUSPAR) tablet 10 mg, 10 mg, Oral, BID, Antionette Alarcon MD, 10 mg at 11/04/21 0932    traZODone (DESYREL) tablet 50 mg, 50 mg, Oral, Nightly PRN, Antionette Alarcon MD    sodium chloride flush 0.9 % injection 5-40 mL, 5-40 mL, IntraVENous, 2 times per day, Antionette Alarcon MD, 10 mL at 11/04/21 0935    sodium chloride flush 0.9 % injection 5-40 mL, 5-40 mL, IntraVENous, PRN, Antionette Alarcon MD    0.9 % sodium chloride infusion, 25 mL, IntraVENous, PRN, Antionette Alarcon MD    enoxaparin (LOVENOX) injection 30 mg, 30 mg, SubCUTAneous, BID, Antionette Alarcon MD, 30 mg at 11/04/21 0932    ondansetron (ZOFRAN-ODT) disintegrating tablet 4 mg, 4 mg, Oral, Q8H PRN, 4 mg at 11/03/21 0219 **OR** ondansetron (ZOFRAN) injection 4 mg, 4 mg, IntraVENous, Q6H PRN, John Paul Galarza MD, 4 mg at 11/03/21 1302    polyethylene glycol (GLYCOLAX) packet 17 g, 17 g, Oral, Daily PRN, John Paul Galarza MD    acetaminophen (TYLENOL) tablet 650 mg, 650 mg, Oral, Q6H PRN, 650 mg at 11/03/21 1236 **OR** acetaminophen (TYLENOL) suppository 650 mg, 650 mg, Rectal, Q6H PRN, John Paul Galarza MD    dexamethasone (DECADRON) injection 6 mg, 6 mg, IntraVENous, Q24H, John Paul Galarza MD, 6 mg at 11/04/21 0935    Vitamin D (CHOLECALCIFEROL) tablet 2,000 Units, 2,000 Units, Oral, Daily, John Paul Galarza MD, 2,000 Units at 11/04/21 0934    guaiFENesin-dextromethorphan (ROBITUSSIN DM) 100-10 MG/5ML syrup 5 mL, 5 mL, Oral, Q4H PRN, John Paul Galarza MD, 5 mL at 11/03/21 1236    albuterol (PROVENTIL) nebulizer solution 2.5 mg, 2.5 mg, Nebulization, Q6H PRN, John Paul Galarza MD    baricitinib (OLUMIANT) tablet 2 mg, 2 mg, Oral, Daily, John Paul Galarza MD, 2 mg at 11/04/21 0934    lurasidone (LATUDA) tablet 40 mg, 40 mg, Oral, Daily, John Paul Galarza MD, 40 mg at 11/04/21 0933    clonazePAM (KLONOPIN) tablet 1 mg, 1 mg, Oral, BID PRN, [facility] DO [place], 1 mg at 11/04/21 0932    gabapentin (NEURONTIN) capsule 600 mg, 600 mg, Oral, BID, Ke Kee DO, 600 mg at 11/04/21 0934    FLUoxetine (PROZAC) capsule 60 mg, 60 mg, Oral, Daily, Ke Kee DO, 60 mg at 11/04/21 0934    albuterol-ipratropium (COMBIVENT RESPIMAT)  MCG/ACT inhaler 1 puff, 1 puff, Inhalation, Q6H, Shannon Brewer MD, 1 puff at 11/04/21 1117    fluticasone (FLOVENT HFA) 110 MCG/ACT inhaler 1 puff, 1 puff, Inhalation, BID, John Paul Galarza MD, 1 puff at 11/04/21 0654  ALLERGIES     Codeine, Dust mite extract, Aspirin, and Penicillins  There is no immunization history for the selected administration types on file for this patient.    Internal Administration   First Dose      Second Dose           Last COVID Lab SARS-CoV-2, NAAT (no units)   Date Value   2021 DETECTED (A)            PAST MEDICAL HISTORY     Past Medical History:   Diagnosis Date    Alcoholism ([facility] [address]     H/O    COPD (chronic obstructive pulmonary disease) ([facility] [address]     Dental caries     Hypertension     no meds    Lung nodules      un[place].  Schizophrenia ([facility] [address]     stable    Ventricular hypokinesis     H/O     SURGICAL HISTORY       Past Surgical History:   Procedure Laterality Date    APPENDECTOMY       SECTION      2 C SECTION    ECHO COMPLETE  1/10/2014         [place] DENTAL SURGERY PROCEDURE N/A 2018    MULTIPLE DENTAL EXTRACTIONS AND ALVELOPLASTY performed by Claudene Sample, DDS at [address] [place] [address] Right     WRIST SURGERY Right    ·      PHYSICAL EXAM          Vitals:    21 2000 21 0200 21 0915 21 1459   BP: 110/60 (!) 111/56 (!) 114/58 114/72   Pulse: 80 65 73 74   Resp: 20 18 18 18   Temp: 97.5 °F (36.4 °C)  96.9 °F (36.1 °C) 97.7 °F (36.5 °C)   TempSrc: Infrared  Infrared Infrared   SpO2: 96% 94% 94% 93%   Weight:       Height:         Physical Exam   Constitutional/General: Alert   NAD THIN   Head: NC/AT  Eyes: PERRL, EOMI    Pulmonary: Lungs DEC  to auscultation bilaterally POST . Not in respiratory distress ON RA   Cardiovascular:  Regular rate and rhythm,    Abdomen: Soft, + BS. No distension. Nontender. Extremities: Moves all extremities x 4. Warm and well perfused  Skin: Warm and dry without rash  Neurologic:    No focal deficits  Psych: FLAT  Affect     DIAGNOSTIC RESULTS   RADIOLOGY:   XR CHEST PORTABLE    Result Date: 2021  EXAMINATION: ONE XRAY VIEW OF THE CHEST 2021 8:22 am COMPARISON: 2021 HISTORY: ORDERING SYSTEM PROVIDED HISTORY: cough TECHNOLOGIST PROVIDED HISTORY: Reason for exam:->cough FINDINGS: Cardiomediastinal silhouette is unremarkable. No airspace infiltrate, effusion, or pneumothorax is seen.   No acute osseous abnormality is identified. No radiographic evidence of acute cardiopulmonary disease process     LABS  Recent Labs     11/02/21 0519 11/04/21 0737   WBC 7.9 2.9*   HGB 13.3 12.2   HCT 40.5 38.8   .5* 102.6*    203     Recent Labs     11/02/21 0519 11/02/21 0519 11/03/21 0518 11/03/21 0518 11/04/21 0737     --  134  --  136   K 4.0  --  4.1   < > 4.2      < > 100   < > 101   CO2 25   < > 27   < > 31*   BUN 20  --  20  --  17   CREATININE 1.1*  --  1.1*  --  1.0   GFRAA >60  --  >60  --  >60   LABGLOM 51  --  51  --  57   GLUCOSE 89  --  83  --  80   PROT 6.9  --  6.7  --  6.5   LABALBU 3.6  --  3.6  --  3.5   CALCIUM 8.7  --  8.9  --  9.0   BILITOT 0.2  --  0.2  --  0.2   ALKPHOS 98  --  86  --  76   AST 23  --  29  --  20   ALT 13  --  15  --  13    < > = values in this interval not displayed.      Recent Labs     11/02/21 0519 11/04/21 0737   PROCAL 0.30* 0.11*     Lab Results   Component Value Date    CRP 2.4 (H) 11/03/2021    CRP 6.2 (H) 11/02/2021    CRP 5.2 (H) 11/02/2021     Lab Results   Component Value Date    SEDRATE 45 (H) 11/01/2021     No results found for: [id number]  Lab Results   Component Value Date    COVID19 DETECTED 11/01/2021     COVID-19/REBEKAH-COV2 LABS  Recent Labs     11/02/21  0204 11/02/21 0519 11/03/21 0518 11/04/21 0737   CRP 5.2* 6.2* 2.4*  --    PROCAL  --  0.30*  --  0.11*   FERRITIN  --  142 136  --    LDH  --  198  --   --    DDIMER  --  603 316  --    FIBRINOGEN  --  >700*  --   --    INR  --  1.0  --   --    PROTIME  --  11.8  --   --    AST  --  23 29 20   ALT  --  13 15 13       MICROBIOLOGY:     Cultures :   Lab Results   Component Value Date    BC 24 Hours no growth 11/01/2021    BC 5 Days no growth 09/14/2021    BC 5 Days no growth 07/02/2021    ORG Staphylococcus coagulase-negative 11/01/2021     Lab Results   Component Value Date    BLOODCULT2  11/01/2021     Gram stain performed from blood culture bottle media  Gram positive cocci in clusters      BLOODCULT2  11/01/2021     This organism was isolated in one set. Susceptibility testing is not routinely done as this  organism frequently represents skin contamination. Additional testing can be ordered by calling the  Microbiology Department. BLOODCULT2 5 Days no growth 09/14/2021    ORG Staphylococcus coagulase-negative 11/01/2021       No results found for: WNDABS    Smear, Respiratory   Date Value Ref Range Status   07/03/2021   Final    Group 5: >25 PMN's/LPF and <10 Epithelial cells/LPF  Polymorphonuclear leukocytes not seen  Few Epithelial cells  No organisms seen       No results found for: MPNEUMO, CLAMYDCU, LABLEGI, AFBCX, FUNGSM, LABFUNG  CULTURE, RESPIRATORY   Date Value Ref Range Status   07/03/2021 Oral Pharyngeal Kathleen reduced  Final        Urine Culture, Routine   Date Value Ref Range Status   11/01/2021 <10,000 CFU/mL  Mixed gram positive organisms    Final        FINAL IMPRESSION    Patient is a 63 y.o. female who presented with   Chief Complaint   Patient presents with    Shortness of Breath     hx COPD and Asthma, states always SOB but worse today.  Cough    and admitted for WILLIE (acute kidney injury) ([facility] [address] [N17.9]  Acute respiratory failure with hypoxia (HCC) [J96.01]  COVID [U07.1] PT WAS ON 6L WEANED TO RA HAS HYPOXEMIA   fevers  Gram positive cocci in clusters looks CONS  Urine GPO WITH TRICHOMONIASIS  LEUKOPENIA      REPEAT BLOOD CX  CHECK URINE GC/CHLAMYDIA   HIV/HEP C    vancomycin (VANCOCIN) 1,000 mg in dextrose 5 % 250 mL IVPB, Q24H   S/P FLAGYL  baricitinib (OLUMIANT) tablet 2 mg, Daily     FOLLOW CLINICALLY   Imaging and labs were reviewed per medical records and any ID pertinent labs were addressed with the patient. The patient/FAMILY  was educated about the diagnosis, prognosis, indications, risks and benefits of treatment. An opportunity to ask questions was given to the patient/FAMILY and questions were answered.       Thank you for involving me

## 2021-11-04 NOTE — PROGRESS NOTES
Physical Therapy    Physical Therapy Treatment Note/Plan of Care    Room #:  7079/4688-59  Patient Name: Dakota Yo  YOB: 1960  MRN: [id number]    Date of Service: 11/4/2021     Tentative placement recommendation: Subacute vs Home Health Physical Therapy if patient meets goals  Equipment recommendation: To be determined      Evaluating Physical Therapist: Josette Baer  #[id number]      Specific Provider Orders/Date/Referring Provider :  11/01/21 1815   PT evaluation and treat Start: 11/01/21 1815, End: 11/01/21 1815, ONE TIME, Standing Count: 1 Occurrences, Karissa Ryder MD      Admitting Diagnosis:   WILLIE (acute kidney injury) ([facility] [N17.9]  Acute respiratory failure with hypoxia ([address] [J96.01]  COVID [U07.1]       Surgery: none  Visit Diagnoses       Codes    COVID     U07.1          Patient Active Problem List   Diagnosis    Chronic pain of right knee    Osteoarthritis of spine with radiculopathy, cervical region    Cervical disc disorder    Cervical radiculopathy    Chronic pain syndrome    Acute pain of right knee    Acute respiratory failure with hypoxia and hypercapnia (HCC)    CAP (community acquired pneumonia)    COPD exacerbation ([address]    Depression    Acute exacerbation of chronic obstructive airways disease ([address]    Respiratory distress    WILLIE (acute kidney injury) ([address]    Schizophrenia ([address]    Normal anion gap metabolic acidosis    Acute respiratory failure with hypoxia (HCC)        ASSESSMENT of Current Deficits Patient exhibits decreased strength, balance, endurance and coordination impairing functional mobility, transfers, gait , gait distance and tolerance to activity are barriers to d/c and require skilled intervention during hospital stay to attain pre hospital level of function. Patient having difficulty with staying awake during tx session due to her c/o being tired from not sleeping well last night.  Pt needed minimal assist with her sitting balance due to left lateral/posterior lean. Pt not appropriate to stand or try and walk due to her energy being used in trying to stay awake. PHYSICAL THERAPY  PLAN OF CARE       Physical therapy plan of care is established based on physician order,  patient diagnosis and clinical assessment    Current Treatment Recommendations:    -Standing Balance: Perform strengthening exercises in standing to promote motor control with or without upper extremity support   -Transfers: Provide instruction on proper hand and foot position for adequate transfer of weight onto lower extremities and use of gait device if needed and Cues for hand placement, technique and safety. Provide stabilization to prevent fall   -Gait: Gait training and Standing activities to improve: base of support, weight shift, weight bearing    -Endurance: Utilize Supervised activities to increase level of endurance to allow for safe functional mobility including transfers and gait   -Stairs: Stair training with instruction on proper technique and hand placement on rail    PT long term treatment goals are located in below grid    Patient and or family understand(s) diagnosis, prognosis, and plan of care. Frequency of treatments: Patient will be seen  daily. Prior Level of Function: Patient ambulated independently    Rehab Potential: good    for baseline    Past medical history:   Past Medical History:   Diagnosis Date    Alcoholism ([address]     H/O    COPD [facility] obstructive [facility] disease) ([facility]     Dental caries     Hypertension     no meds    Lung nodules      [place].     Schizophrenia ([facility]     stable    Ventricular hypokinesis     H/O     Past Surgical History:   Procedure Laterality Date    APPENDECTOMY       SECTION      2 C SECTION    ECHO COMPLETE  1/10/2014         RI DENTAL SURGERY PROCEDURE N/A 2018    MULTIPLE DENTAL EXTRACTIONS AND ALVELOPLASTY performed by Ruthie Osman DDS at [facility] ENDOSCOPY    SHOULDER SURGERY Right     WRIST SURGERY Right        SUBJECTIVE:    Precautions: Up as tolerated, falls, alarm and Droplet plus/COVID-19 ,    Social history: Patient lives with son in a apartment    with 3 steps  to enter      Tub shower grab bars    Equipment owned: None,       [address]        AM-PAC Mobility Inpatient   How much difficulty turning over in bed?: None  How much difficulty sitting down on / standing up from a chair with arms?: A Lot  How much difficulty moving from lying on back to sitting on side of bed?: A Little  How much help from another person moving to and from a bed to a chair?: A Lot  How much help from another person needed to walk in hospital room?: A Lot  How much help from another person for climbing 3-5 steps with a railing?: A Lot  AM-PAC Inpatient Mobility Raw Score : 15  AM-PAC Inpatient T-Scale Score : 39.45  Mobility Inpatient CMS 0-100% Score: 57.7  Mobility Inpatient CMS G-Code Modifier : CK    Nursing cleared patient for PT treatment. . Pt c/o being tired. OBJECTIVE;   Initial Evaluation  Date: 11/3/2021 Treatment Date:  11/4/2021       Short Term/ Long Term   Goals   Was pt agreeable to Eval/treatment? Yes yes To be met in 4 days   Pain level   0/10    0/10    Bed Mobility    Rolling: Independent    Supine to sit: Independent    Sit to supine: Independent    Scooting: Independent    Rolling: Independent   Supine to sit:  Independent   Sit to supine: Minimal assist of 1   Scooting: Independent       Transfers Sit to stand: Minimal assist of 1 x 3 reps  Sit to stand: Not assessed       Sit to stand: Independent     Ambulation    40 feet using  IV pole with Minimal assist of 1   for balance, upright, multiplane instability, safety and dizziness, Patient with shuffling steps and flexed posture and cues for upright posture and safety not assessed     100 feet using  wheeled walker with Supervision     Stair negotiation: ascended and descended   Not assessed     3 steps with rail supervision    ROM Within functional limits        Strength BUE:  refer to OT rajesh  RLE:  4/5  LLE:  4/5  Increase strength in affected mm groups by 1/3 grade   Balance Sitting EOB:  fair  impulsive  Dynamic Standing:  poor requires bilateral hand support, dizzy, hypotensive Sitting EOB: fair posterior/left lateral lean intermittently  Dynamic Standing: not assessed    Sitting EOB:  good    Dynamic Standing: good with wheeled walker      Patient is Alert & Oriented x person, place, time and situation and follows directions  Anxious, impulsive  Sensation:  Patient  denies numbness/tingling   Edema:  no   Endurance: poor  Tolerance to upright with dizziness and elevated hr    Vitals: room air   Blood Pressure at rest  Blood Pressure during session    Heart Rate at rest  Heart Rate during session    SPO2 at rest 91%  SPO2 during session 89-91%     Patient education  Patient educated on role of Physical Therapy, risks of immobility, safety and plan of care, importance of positional changes for oxygen exchange,  importance of mobility while in hospital  and safety      Patient response to education:   Pt verbalized understanding Pt demonstrated skill Pt requires further education in this area   Yes Partial Yes      Treatment:  Patient practiced and was instructed/facilitated in the following treatment: Patient assisted to edge of bed, Pt  Sat edge of bed 10 minutes with Minimal assist of 1 to increase dynamic sitting balance and activity tolerance. Worked on sitting balance due to left lateral/posterior lean. Pt performed seated exercises. Therapeutic Exercises:  ankle pumps, hip abduction/adduction, long arc quad and seated marching, x 15 reps. AAROM        At end of session, patient in bed with alarm call light and phone within reach,  all lines and tubes intact, nursing notified.       Patient would benefit from continued skilled Physical Therapy to improve functional independence and quality of life. Patient's/ family goals   home    Time in  11:23  Time out  11:36    Total Treatment Time  13 minutes        CPT codes:    Therapeutic activities (75283)   13 minutes  1 unit(s)    Brenda Kirby.  Debo  \[facility]\""  LIC # [id number]

## 2021-11-04 NOTE — PROGRESS NOTES
Department of Internal Medicine  General Internal Medicine  Attending Progress Note  Chief Complaint   Patient presents with    Shortness of Breath     hx COPD and Asthma, states always SOB but worse today.  Cough     SUBJECTIVE:    Patient appears more lethargic today. She stated that she doesn't feel good. No fever or chills. No chest pain.    Noted episode of dizziness yesterday during PT.     OBJECTIVE      Medications    Current Facility-Administered Medications: vancomycin (VANCOCIN) 1,000 mg in dextrose 5 % 250 mL IVPB, 1,000 mg, IntraVENous, Q24H  benztropine (COGENTIN) tablet 1 mg, 1 mg, Oral, BID  busPIRone (BUSPAR) tablet 10 mg, 10 mg, Oral, BID  traZODone (DESYREL) tablet 50 mg, 50 mg, Oral, Nightly PRN  sodium chloride flush 0.9 % injection 5-40 mL, 5-40 mL, IntraVENous, 2 times per day  sodium chloride flush 0.9 % injection 5-40 mL, 5-40 mL, IntraVENous, PRN  0.9 % sodium chloride infusion, 25 mL, IntraVENous, PRN  enoxaparin (LOVENOX) injection 30 mg, 30 mg, SubCUTAneous, BID  ondansetron (ZOFRAN-ODT) disintegrating tablet 4 mg, 4 mg, Oral, Q8H PRN **OR** ondansetron (ZOFRAN) injection 4 mg, 4 mg, IntraVENous, Q6H PRN  polyethylene glycol (GLYCOLAX) packet 17 g, 17 g, Oral, Daily PRN  acetaminophen (TYLENOL) tablet 650 mg, 650 mg, Oral, Q6H PRN **OR** acetaminophen (TYLENOL) suppository 650 mg, 650 mg, Rectal, Q6H PRN  dexamethasone (DECADRON) injection 6 mg, 6 mg, IntraVENous, Q24H  Vitamin D (CHOLECALCIFEROL) tablet 2,000 Units, 2,000 Units, Oral, Daily  guaiFENesin-dextromethorphan (ROBITUSSIN DM) 100-10 MG/5ML syrup 5 mL, 5 mL, Oral, Q4H PRN  albuterol (PROVENTIL) nebulizer solution 2.5 mg, 2.5 mg, Nebulization, Q6H PRN  baricitinib (OLUMIANT) tablet 2 mg, 2 mg, Oral, Daily  lurasidone (LATUDA) tablet 40 mg, 40 mg, Oral, Daily  clonazePAM (KLONOPIN) tablet 1 mg, 1 mg, Oral, BID PRN  gabapentin (NEURONTIN) capsule 600 mg, 600 mg, Oral, BID  FLUoxetine (PROZAC) capsule 60 mg, 60 mg, Oral, Daily  albuterol-ipratropium (COMBIVENT RESPIMAT)  MCG/ACT inhaler 1 puff, 1 puff, Inhalation, Q6H  fluticasone (FLOVENT HFA) 110 MCG/ACT inhaler 1 puff, 1 puff, Inhalation, BID  Physical    VITALS:  BP (!) 114/58   Pulse 73   Temp 96.9 °F (36.1 °C) (Infrared)   Resp 18   Ht 5' 11\" (1.803 m)   Wt 145 lb (65.8 kg)   LMP  (LMP Unknown)   SpO2 94%   BMI 20.22 kg/m²   CONSTITUTIONAL:  awake, alert, cooperative, no apparent distress, and appears stated age  ENT:  normocepalic, without obvious abnormality  NECK:  supple, symmetrical, trachea midline  LUNGS:  good air exchange and clear to auscultation  CARDIOVASCULAR:  normal apical pulses and normal S1 and S2  ABDOMEN:  normal bowel sounds and non-distended  MUSCULOSKELETAL:  there is no redness, warmth, or swelling of the joints  NEUROLOGIC:  Mental Status Exam:  Level of Alertness:   awake  Orientation:   person, place, time  SKIN:  no bruising or bleeding  Data    CBC:   Lab Results   Component Value Date    WBC 2.9 11/04/2021    RBC 3.78 11/04/2021    HGB 12.2 11/04/2021    HCT 38.8 11/04/2021    .6 11/04/2021    MCH 32.3 11/04/2021    MCHC 31.4 11/04/2021    RDW 13.7 11/04/2021     11/04/2021    MPV 11.2 11/04/2021     BMP:    Lab Results   Component Value Date     11/04/2021    K 4.2 11/04/2021    K 4.0 11/02/2021     11/04/2021    CO2 31 11/04/2021    BUN 17 11/04/2021    LABALBU 3.5 11/04/2021    CREATININE 1.0 11/04/2021    CALCIUM 9.0 11/04/2021    GFRAA >60 11/04/2021    LABGLOM 57 11/04/2021    GLUCOSE 80 11/04/2021       ASSESSMENT AND PLAN      1. Acute respiratory failure with hypoxia ([address]  2. Bacteremia:  3.  covid Viral infection  4. COPD with Exacerbation  5. Malnutrition  6. Hypotension:  7.  Schizophrenia/depression:  8. Trachomatis    Plans:  Continue abx  Pt ot  Supplemental oxygen if needed  Continue decadron  Check ABG  Appreciated ID recommendation  Dizziness suspects to be due to poor BP.

## 2021-11-05 ENCOUNTER — APPOINTMENT (OUTPATIENT)
Dept: CT IMAGING | Age: 61
DRG: 137 | End: 2021-11-05
Payer: COMMERCIAL

## 2021-11-05 LAB
HEPATITIS C ANTIBODY INTERPRETATION: NORMAL
HIV-1 AND HIV-2 ANTIBODIES: NORMAL

## 2021-11-05 PROCEDURE — 2060000000 HC ICU INTERMEDIATE R&B

## 2021-11-05 PROCEDURE — 99232 SBSQ HOSP IP/OBS MODERATE 35: CPT | Performed by: INTERNAL MEDICINE

## 2021-11-05 PROCEDURE — 6370000000 HC RX 637 (ALT 250 FOR IP): Performed by: INTERNAL MEDICINE

## 2021-11-05 PROCEDURE — 70450 CT HEAD/BRAIN W/O DYE: CPT

## 2021-11-05 PROCEDURE — 72125 CT NECK SPINE W/O DYE: CPT

## 2021-11-05 PROCEDURE — 74176 CT ABD & PELVIS W/O CONTRAST: CPT

## 2021-11-05 PROCEDURE — 6360000004 HC RX CONTRAST MEDICATION: Performed by: RADIOLOGY

## 2021-11-05 PROCEDURE — 94640 AIRWAY INHALATION TREATMENT: CPT

## 2021-11-05 PROCEDURE — 6360000002 HC RX W HCPCS: Performed by: INTERNAL MEDICINE

## 2021-11-05 PROCEDURE — 6370000000 HC RX 637 (ALT 250 FOR IP): Performed by: HOSPITALIST

## 2021-11-05 PROCEDURE — 2580000003 HC RX 258: Performed by: INTERNAL MEDICINE

## 2021-11-05 PROCEDURE — 97530 THERAPEUTIC ACTIVITIES: CPT

## 2021-11-05 RX ADMIN — BENZTROPINE MESYLATE 1 MG: 1 TABLET ORAL at 20:57

## 2021-11-05 RX ADMIN — ENOXAPARIN SODIUM 30 MG: 100 INJECTION SUBCUTANEOUS at 08:50

## 2021-11-05 RX ADMIN — BENZTROPINE MESYLATE 1 MG: 1 TABLET ORAL at 08:50

## 2021-11-05 RX ADMIN — GUAIFENESIN AND DEXTROMETHORPHAN 5 ML: 100; 10 SYRUP ORAL at 14:27

## 2021-11-05 RX ADMIN — GABAPENTIN 600 MG: 300 CAPSULE ORAL at 20:57

## 2021-11-05 RX ADMIN — IPRATROPIUM BROMIDE AND ALBUTEROL 1 PUFF: 20; 100 SPRAY, METERED RESPIRATORY (INHALATION) at 22:24

## 2021-11-05 RX ADMIN — Medication 10 ML: at 21:30

## 2021-11-05 RX ADMIN — ENOXAPARIN SODIUM 30 MG: 100 INJECTION SUBCUTANEOUS at 20:56

## 2021-11-05 RX ADMIN — GABAPENTIN 600 MG: 300 CAPSULE ORAL at 08:51

## 2021-11-05 RX ADMIN — BUSPIRONE HYDROCHLORIDE 10 MG: 10 TABLET ORAL at 08:51

## 2021-11-05 RX ADMIN — BARICITINIB 2 MG: 2 TABLET, FILM COATED ORAL at 08:51

## 2021-11-05 RX ADMIN — Medication 2000 UNITS: at 08:51

## 2021-11-05 RX ADMIN — IPRATROPIUM BROMIDE AND ALBUTEROL 1 PUFF: 20; 100 SPRAY, METERED RESPIRATORY (INHALATION) at 07:11

## 2021-11-05 RX ADMIN — IPRATROPIUM BROMIDE AND ALBUTEROL 1 PUFF: 20; 100 SPRAY, METERED RESPIRATORY (INHALATION) at 11:05

## 2021-11-05 RX ADMIN — FLUTICASONE PROPIONATE 1 PUFF: 110 AEROSOL, METERED RESPIRATORY (INHALATION) at 18:34

## 2021-11-05 RX ADMIN — DEXAMETHASONE SODIUM PHOSPHATE 6 MG: 10 INJECTION INTRAMUSCULAR; INTRAVENOUS at 08:52

## 2021-11-05 RX ADMIN — IPRATROPIUM BROMIDE AND ALBUTEROL 1 PUFF: 20; 100 SPRAY, METERED RESPIRATORY (INHALATION) at 18:34

## 2021-11-05 RX ADMIN — BUSPIRONE HYDROCHLORIDE 10 MG: 10 TABLET ORAL at 20:56

## 2021-11-05 RX ADMIN — VANCOMYCIN HYDROCHLORIDE 1000 MG: 1 INJECTION, POWDER, LYOPHILIZED, FOR SOLUTION INTRAVENOUS at 10:45

## 2021-11-05 RX ADMIN — FLUTICASONE PROPIONATE 1 PUFF: 110 AEROSOL, METERED RESPIRATORY (INHALATION) at 07:11

## 2021-11-05 RX ADMIN — LURASIDONE HYDROCHLORIDE 40 MG: 40 TABLET, FILM COATED ORAL at 08:51

## 2021-11-05 RX ADMIN — Medication 10 ML: at 08:52

## 2021-11-05 RX ADMIN — FLUOXETINE 60 MG: 20 CAPSULE ORAL at 08:52

## 2021-11-05 ASSESSMENT — ENCOUNTER SYMPTOMS
SHORTNESS OF BREATH: 1
COUGH: 1

## 2021-11-05 NOTE — PROGRESS NOTES
[facility]     [address] [place] [address] 80  [place], [address]  Phone [phone]   [phone]      Admit Date: 2021  8:52 AM  Pt Name: Janee Weir  MRN: [id number]  : 1960  Reason for Consult:    Chief Complaint   Patient presents with    Shortness of Breath     hx COPD and Asthma, states always SOB but worse today.  Cough     Requesting Physician:  Ana Malik MD  PCP: CHICHO Wagoner - CNP  History Obtained From:  patient, chart   ID consulted for WILLIE (acute kidney injury) ([facility] [N17.9]  Acute respiratory failure with hypoxia ([facility] [facility] [J96.01]  COVID [U07.1]  on hospital day [facility]       Chief Complaint   Patient presents with    Shortness of Breath     hx COPD and Asthma, states always SOB but worse today.  Cough     HISTORYOF PRESENT ILLNESS   Janee Weir is a 63 y.o. female who presents with   has a past medical history of Alcoholism ([facility] [address] COPD (chronic obstructive pulmonary disease) ([facility] [address] Dental caries, Hypertension, Lung nodules, Schizophrenia ([address] and Ventricular hypokinesis. Shortness of Breath    Cough  Associated symptoms include shortness of breath.      PT CAME IN WITH COUGH FEVERS, HA CHEST PAIN AND ABD PAIN   PT WA SON 6L  PT IS CURRENTLY ON ROOM AIR  SHE DENIES EXPOSURE  WBC 9.5 CR1.2 COVID +  ID WAS ASKED TO SEE FOR BACTEREMIA CONS  CURRENTYL AFEBRILE  PT HAS ORTHOPEDIC  HARDWARE   PT IS SEXUALLY ACTIVE  PT HAS NO APPETITE DOES NOT WANT TO EAT   LIVES ALONE    CURRENT VISIT  21   Pt appears better  Has no c/o f/c/n/v/d     REVIEW OF SYSTEMS     CONSTITUTIONAL:   No fever, chills, weight loss  ALLERGIES:    No urticaria, hay fever,    EYES:     No blurry vision, loss of vision, eye pain  ENT:      No hearing loss, sore throat  CARDIOVASCULAR:  No chest pain or palpitations  RESPIRATORY:   No cough, sob  ENDOCRINE:    No increase thirst, urination   HEME-LYMPH:   No easy bruising or bleeding  GI:     No nausea, vomiting or diarrhea  :     No urinary complaints  NEURO:    No seizures, stroke, HA  MUSCULOSKELETAL:    muscle aches or pain, no joint pain  SKIN:     No rash or itch  PSYCH:    No depression or anxiety    Medications Prior to Admission: clonazePAM (KLONOPIN) 2 MG tablet, Take 1 mg by mouth 2 times daily as needed for Anxiety. FLUoxetine (PROZAC) 40 MG capsule, Take 40 mg by mouth daily Take with Prozac 20 mg for a total dose of 60 mg daily. gabapentin (NEURONTIN) 600 MG tablet, Take 600 mg by mouth 2 times daily. traZODone (DESYREL) 50 MG tablet, Take  mg by mouth nightly as needed for Sleep   benztropine (COGENTIN) 1 MG tablet, Take 1 mg by mouth 2 times daily  lurasidone (LATUDA) 40 MG TABS tablet, Take 40 mg by mouth daily  FLUoxetine (PROZAC) 20 MG capsule, Take 20 mg by mouth daily Take with Prozac 40 mg for a total dose of 60 mg daily.   busPIRone (BUSPAR) 10 MG tablet, Take 10 mg by mouth 2 times daily   CURRENT MEDICATIONS     Current Facility-Administered Medications:     nicotine (NICODERM CQ) 21 MG/24HR 1 patch, 1 patch, TransDERmal, Daily, Dawit Joy MD, 1 patch at 11/05/21 0851    vancomycin (VANCOCIN) 1,000 mg in dextrose 5 % 250 mL IVPB, 1,000 mg, IntraVENous, Q24H, Candida Carmona MD, Stopped at 11/05/21 1145    benztropine (COGENTIN) tablet 1 mg, 1 mg, Oral, BID, Cnadida Carmona MD, 1 mg at 11/05/21 0850    busPIRone (BUSPAR) tablet 10 mg, 10 mg, Oral, BID, Candida Carmona MD, 10 mg at 11/05/21 0851    traZODone (DESYREL) tablet 50 mg, 50 mg, Oral, Nightly PRN, Candida Carmona MD, 50 mg at 11/04/21 2256    sodium chloride flush 0.9 % injection 5-40 mL, 5-40 mL, IntraVENous, 2 times per day, Candida Carmona MD, 10 mL at 11/05/21 0852    sodium chloride flush 0.9 % injection 5-40 mL, 5-40 mL, IntraVENous, PRN, Candida Carmona MD    0.9 % sodium chloride infusion, 25 mL, IntraVENous, PRN, Cristi Brewer, MD    enoxaparin (LOVENOX) injection 30 mg, 30 mg, SubCUTAneous, BID, Henri Brown MD, 30 mg at 11/05/21 0850    ondansetron (ZOFRAN-ODT) disintegrating tablet 4 mg, 4 mg, Oral, Q8H PRN, 4 mg at 11/03/21 0219 **OR** ondansetron (ZOFRAN) injection 4 mg, 4 mg, IntraVENous, Q6H PRN, Henri Brown MD, 4 mg at 11/03/21 1302    polyethylene glycol (GLYCOLAX) packet 17 g, 17 g, Oral, Daily PRN, Henri Brown MD    acetaminophen (TYLENOL) tablet 650 mg, 650 mg, Oral, Q6H PRN, 650 mg at 11/03/21 1236 **OR** acetaminophen (TYLENOL) suppository 650 mg, 650 mg, Rectal, Q6H PRN, Henri Brown MD    dexamethasone (DECADRON) injection 6 mg, 6 mg, IntraVENous, Q24H, Henri Brown MD, 6 mg at 11/05/21 1115    Vitamin D (CHOLECALCIFEROL) tablet 2,000 Units, 2,000 Units, Oral, Daily, Henri Brown MD, 2,000 Units at 11/05/21 0851    guaiFENesin-dextromethorphan (ROBITUSSIN DM) 100-10 MG/5ML syrup 5 mL, 5 mL, Oral, Q4H PRN, Henri Brown MD, 5 mL at 11/03/21 1236    albuterol (PROVENTIL) nebulizer solution 2.5 mg, 2.5 mg, Nebulization, Q6H PRN, Henri Brown MD    baricitinib (OLUMIANT) tablet 2 mg, 2 mg, Oral, Daily, Henri Brown MD, 2 mg at 11/05/21 0851    lurasidone (LATUDA) tablet 40 mg, 40 mg, Oral, Daily, Henri Brown MD, 40 mg at 11/05/21 0851    clonazePAM (KLONOPIN) tablet 1 mg, 1 mg, Oral, BID PRN, Mabelene Sever Raspovic, DO, 1 mg at 11/04/21 0932    gabapentin (NEURONTIN) capsule 600 mg, 600 mg, Oral, BID, Ke Kee DO, 600 mg at 11/05/21 0851    FLUoxetine (PROZAC) capsule 60 mg, 60 mg, Oral, Daily, Ke Kee DO, 60 mg at 11/05/21 0852    albuterol-ipratropium (COMBIVENT RESPIMAT)  MCG/ACT inhaler 1 puff, 1 puff, Inhalation, Q6H, Diana Brewer MD, 1 puff at 11/05/21 1105    fluticasone (FLOVENT HFA) 110 MCG/ACT inhaler 1 puff, 1 puff, Inhalation, BID, Henri Brown MD, 1 puff at 11/05/21 0711  ALLERGIES     Codeine, Dust mite extract, Aspirin, and Penicillins  There is no immunization history for the selected administration types on file for this patient. Internal Administration   First Dose      Second Dose           Last COVID Lab SARS-CoV-2, NAAT (no units)   Date Value   2021 DETECTED (A)            PAST MEDICAL HISTORY     Past Medical History:   Diagnosis Date    Alcoholism ([facility]     H/O    [facility]     Dental caries     Hypertension     no meds    Lung nodules      [place].  Schizophrenia ([facility]     stable    Ventricular hypokinesis     H/O     SURGICAL HISTORY       Past Surgical History:   Procedure Laterality Date    APPENDECTOMY       SECTION      2 C SECTION    ECHO COMPLETE  1/10/2014         [place] DENTAL SURGERY PROCEDURE N/A 2018    MULTIPLE DENTAL EXTRACTIONS AND ALVELOPLASTY performed by Anna Whittaker DDS at [address] [place] [address] Right     WRIST SURGERY Right    ·      PHYSICAL EXAM          Vitals:    21 0400 21 0711 21 0845 21 1105   BP: 120/63  128/69    Pulse: 76  76    Resp: 20 20 20 20   Temp: 97.7 °F (36.5 °C)  98 °F (36.7 °C)    TempSrc: Infrared  Infrared    SpO2: 96% 97% 97% 98%   Weight:       Height:         Physical Exam   Constitutional/General: Alert   NAD THIN   Head: NC/AT  Eyes: PERRL, EOMI    Pulmonary: Lungs DEC  to auscultation bilaterally POST . Not in respiratory distress ON RA   Cardiovascular:  Regular rate and rhythm,    Abdomen: Soft, + BS. No distension. Nontender. Extremities: Moves all extremities x 4.    Warm and well perfused  Skin: Warm and dry without rash  Neurologic:    No focal deficits  Psych: FLAT  Affect     DIAGNOSTIC RESULTS   RADIOLOGY:   XR CHEST PORTABLE    Result Date: 2021  EXAMINATION: ONE XRAY VIEW OF THE CHEST 2021 8:22 am COMPARISON: 2021 HISTORY: ORDERING SYSTEM PROVIDED HISTORY: cough TECHNOLOGIST PROVIDED HISTORY: Reason for exam:->cough FINDINGS: Cardiomediastinal silhouette is unremarkable. No airspace infiltrate, effusion, or pneumothorax is seen. No acute osseous abnormality is identified. No radiographic evidence of acute cardiopulmonary disease process     LABS  Recent Labs     11/04/21  0737   WBC 2.9*   HGB 12.2   HCT 38.8   .6*        Recent Labs     11/03/21  0518 11/03/21  0518 11/04/21  0737     --  136   K 4.1   < > 4.2      < > 101   CO2 27   < > 31*   BUN 20  --  17   CREATININE 1.1*  --  1.0   GFRAA >60  --  >60   LABGLOM 51  --  57   GLUCOSE 83  --  80   PROT 6.7  --  6.5   LABALBU 3.6  --  3.5   CALCIUM 8.9  --  9.0   BILITOT 0.2  --  0.2   ALKPHOS 86  --  76   AST 29  --  20   ALT 15  --  13    < > = values in this interval not displayed. Recent Labs     11/04/21  0737   PROCAL 0.11*     Lab Results   Component Value Date    CRP 2.4 (H) 11/03/2021    CRP 6.2 (H) 11/02/2021    CRP 5.2 (H) 11/02/2021     Lab Results   Component Value Date    SEDRATE 45 (H) 11/01/2021     No results found for: [id number]  Lab Results   Component Value Date    COVID19 DETECTED 11/01/2021     COVID-19/REBEKAH-COV2 LABS  Recent Labs     11/03/21 0518 11/04/21  0737   CRP 2.4*  --    PROCAL  --  0.11*   FERRITIN 136  --    DDIMER 316  --    AST 29 20   ALT 15 13       MICROBIOLOGY:     Cultures :   Lab Results   Component Value Date    BC 24 Hours no growth 11/04/2021    BC 24 Hours no growth 11/01/2021    BC 5 Days no growth 09/14/2021    ORG Staphylococcus coagulase-negative 11/01/2021     Lab Results   Component Value Date    BLOODCULT2 24 Hours no growth 11/04/2021    BLOODCULT2  11/01/2021     Gram stain performed from blood culture bottle media  Gram positive cocci in clusters      BLOODCULT2  11/01/2021     This organism was isolated in one set. Susceptibility testing is not routinely done as this  organism frequently represents skin contamination.   Additional testing can be ordered by calling the  Microbiology Department. ORG Staphylococcus coagulase-negative 11/01/2021       No results found for: WNDABS    Smear, Respiratory   Date Value Ref Range Status   11/04/2021   Final    Group 6: <25 PMN's/LPF and <25 Epithelial cells/LPF  Polymorphonuclear leukocytes not seen  Epithelial cells not seen  Rare Gram positive cocci       No results found for: MPNEUMO, CLAMYDCU, LABLEGI, AFBCX, FUNGSM, LABFUNG  CULTURE, RESPIRATORY   Date Value Ref Range Status   11/04/2021 Oral Pharyngeal Kathleen present  Preliminary        Urine Culture, Routine   Date Value Ref Range Status   11/01/2021 <10,000 CFU/mL  Mixed gram positive organisms    Final        FINAL IMPRESSION    Patient is a 63 y.o. female who presented with   Chief Complaint   Patient presents with    Shortness of Breath     hx COPD and Asthma, states always SOB but worse today.  Cough    and admitted for WILLIE (acute kidney injury) ([facility] [N17.9]  Acute respiratory failure with hypoxia (HCC) [J96.01]  COVID [U07.1] PT WAS ON 6L WEANED TO RA HAS HYPOXEMIA   Fevers better  Gram positive cocci in clusters looks CONS contaminant   Urine GPO WITH TRICHOMONIASIS s/p flagyl  LEUKOPENIA      REPEAT BLOOD CX ngtd  CHECK URINE GC/CHLAMYDIA  pending   HIV/HEP C NR    vancomycin (VANCOCIN) 1,000 mg in dextrose 5 % 250 mL IVPB, Q24H   S/P FLAGYL  baricitinib (OLUMIANT) tablet 2 mg, Daily     FOLLOW CLINICALLY   Can d/c   Imaging and labs were reviewed per medical records and any ID pertinent labs were addressed with the patient. The patient/FAMILY  was educated about the diagnosis, prognosis, indications, risks and benefits of treatment. An opportunity to ask questions was given to the patient/FAMILY and questions were answered. Thank you for involving me in the care of [facility]. Please do not hesitate to call for any questions or concerns.          Electronically signed by Lanre Davis MD on 11/5/2021 at 1:41 PM

## 2021-11-05 NOTE — PROGRESS NOTES
Physical Therapy    Physical Therapy Treatment Note/Plan of Care    Room #:  7282/6836-01  Patient Name: Eric Brownlee  YOB: 1960  MRN: [id number]    Date of Service: 11/5/2021     Tentative placement recommendation: Subacute vs Home Health Physical Therapy if patient meets goals  Equipment recommendation: To be determined      Evaluating Physical Therapist: Josette Carrillo  #[id number]      Specific Provider Orders/Date/Referring Provider :  11/01/21 1815   PT evaluation and treat Start: 11/01/21 1815, End: 11/01/21 1815, ONE TIME, Standing Count: 1 Occurrences, Roberto Wiseman MD      Admitting Diagnosis:   WILLIE (acute kidney injury) ([facility] [address] [N17.9]  Acute respiratory failure with hypoxia ([address] [J96.01]  COVID [U07.1]       Surgery: none  Visit Diagnoses       Codes    COVID     U07.1          Patient Active Problem List   Diagnosis    Chronic pain of right knee    Osteoarthritis of spine with radiculopathy, cervical region    Cervical disc disorder    Cervical radiculopathy    Chronic pain syndrome    Acute pain of right knee    Acute respiratory failure with hypoxia and hypercapnia (HCC)    CAP (community acquired pneumonia)    COPD exacerbation ([address]    Depression    Acute exacerbation of chronic obstructive airways disease ([address]    Respiratory distress    WILLIE (acute kidney injury) ([address]    Schizophrenia ([address]    Normal anion gap metabolic acidosis    Acute respiratory failure with hypoxia (HCC)        ASSESSMENT of Current Deficits Patient exhibits decreased strength, balance, endurance and coordination impairing functional mobility, transfers, gait , gait distance and tolerance to activity are barriers to d/c and require skilled intervention during hospital stay to attain pre hospital level of function. Patient more alert but needed cueing for hand placement for sit to stand transfers and minimal assist with gait training due to being unsteady at times and impulsive. Factors that can increase her risk for falls. PHYSICAL THERAPY  PLAN OF CARE       Physical therapy plan of care is established based on physician order,  patient diagnosis and clinical assessment    Current Treatment Recommendations:    -Standing Balance: Perform strengthening exercises in standing to promote motor control with or without upper extremity support   -Transfers: Provide instruction on proper hand and foot position for adequate transfer of weight onto lower extremities and use of gait device if needed and Cues for hand placement, technique and safety. Provide stabilization to prevent fall   -Gait: Gait training and Standing activities to improve: base of support, weight shift, weight bearing    -Endurance: Utilize Supervised activities to increase level of endurance to allow for safe functional mobility including transfers and gait   -Stairs: Stair training with instruction on proper technique and hand placement on rail    PT long term treatment goals are located in below grid    Patient and or family understand(s) diagnosis, prognosis, and plan of care. Frequency of treatments: Patient will be seen  daily. Prior Level of Function: Patient ambulated independently    Rehab Potential: good    for baseline    Past medical history:   Past Medical History:   Diagnosis Date    Alcoholism ([facility] [address]     H/O    COPD (chronic obstructive pulmonary disease) ([facility] [address]     Dental caries     Hypertension     no meds    Lung nodules      [place].     Schizophrenia ([facility] [address]     stable    Ventricular hypokinesis     H/O     Past Surgical History:   Procedure Laterality Date    APPENDECTOMY       SECTION      2 C SECTION    ECHO COMPLETE  1/10/2014         NM DENTAL SURGERY PROCEDURE N/A 2018    MULTIPLE DENTAL EXTRACTIONS AND ALVELOPLASTY performed by Mike Castleman, DDS at [address] [place] [address] Right     WRIST SURGERY Right        SUBJECTIVE:    Precautions: Up as tolerated, falls, alarm and Droplet plus/COVID-19 ,    Social history: Patient lives with son in a apartment    with 3 steps  to enter      Tub shower grab bars    Equipment owned: None,       [facility]   How much difficulty turning over in bed?: A Little  How much difficulty sitting down on / standing up from a chair with arms?: A Little  How much difficulty moving from lying on back to sitting on side of bed?: A Little  How much help from another person moving to and from a bed to a chair?: A Little  How much help from another person needed to walk in hospital room?: A Little  How much help from another person for climbing 3-5 steps with a railing?: A Lot  AM-PAC Inpatient Mobility Raw Score : 17  AM-PAC Inpatient T-Scale Score : 42.13  Mobility Inpatient CMS 0-100% Score: 50.57  Mobility Inpatient CMS G-Code Modifier : CK    Nursing cleared patient for PT treatment. . Pt c/o sore throat, ulcer in her stomach, pain with her head, back, and legs. OBJECTIVE;   Initial Evaluation  Date: 11/3/2021 Treatment Date:  11/5/2021       Short Term/ Long Term   Goals   Was pt agreeable to Eval/treatment? Yes yes To be met in 4 days   Pain level   0/10    No number given  Head, back, legs    Bed Mobility    Rolling: Independent    Supine to sit: Independent    Sit to supine: Independent    Scooting: Independent    Rolling: Independent   Supine to sit:  Independent   Sit to supine: Independent   Scooting: Independent       Transfers Sit to stand: Minimal assist of 1 x 3 reps  Sit to stand: Minimal assist of 1 Cues for hand placement and safety        Sit to stand: Independent     Ambulation    40 feet using  IV pole with Minimal assist of 1   for balance, upright, multiplane instability, safety and dizziness, Patient with shuffling steps and flexed posture and cues for upright posture and safety 3 feet bed<>BSC, 20 feet to the restroom, 2 x 70 feet using  wheeled walker with Minimal assist of 1   cues for upright posture, walker approximation, safety, pacing and pursed lip breathing       100 feet using  wheeled walker with Supervision     Stair negotiation: ascended and descended   Not assessed     3 steps with rail supervision    ROM Within functional limits        Strength BUE:  refer to OT rajesh  RLE:  4/5  LLE:  4/5  Increase strength in affected mm groups by 1/3 grade   Balance Sitting EOB:  fair  impulsive  Dynamic Standing:  poor requires bilateral hand support, dizzy, hypotensive Sitting EOB: good   Dynamic Standing: fair wheeled walker    Sitting EOB:  good    Dynamic Standing: good with wheeled walker      Patient is Alert & Oriented x person, place, time and situation and follows directions  Anxious, impulsive  Sensation:  Patient  denies numbness/tingling   Edema:  no   Endurance: poor  Tolerance to upright with dizziness and elevated hr    Vitals: room air   Blood Pressure at rest  Blood Pressure during session    Heart Rate at rest  Heart Rate during session    SPO2 at rest 90%  SPO2 during session 88-93%     Patient education  Patient educated on role of Physical Therapy, risks of immobility, safety and plan of care, importance of positional changes for oxygen exchange,  importance of mobility while in hospital  and safety      Patient response to education:   Pt verbalized understanding Pt demonstrated skill Pt requires further education in this area   Yes Partial Yes      Treatment:  Patient practiced and was instructed/facilitated in the following treatment: Patient assisted to edge of bed, assisted on/off BSC. Pt  Sat edge of bed 10 minutes with Supervision  to increase dynamic sitting balance and activity tolerance. Worked on pursed lip breathing due to O2 desaturation. Pt stood, ambulated to the restroom to wash her hands, in the hallway, and back to the edge of the bed. Pt returned to supine.      Therapeutic Exercises:  not performed    At end of session, patient in bed with alarm call light and phone within reach,  all lines and tubes intact, nursing notified. Patient would benefit from continued skilled Physical Therapy to improve functional independence and quality of life. Patient's/ family goals   home    Time in 9:36  Time out  9:59    Total Treatment Time  23 minutes        CPT codes:    Therapeutic activities (07770)   23 minutes  2 unit(s)    Brandi Edmondson  [facility]  LIC # [id number]

## 2021-11-05 NOTE — PROGRESS NOTES
Department of Internal Medicine  General Internal Medicine  Attending Progress Note  Chief Complaint   Patient presents with    Shortness of Breath     hx COPD and Asthma, states always SOB but worse today.  Cough     SUBJECTIVE:    Reports headache, neck pain and abdominal pain. Noted that the abdominal pain is ongoing and that she has seen [facility] out patient without being diagnosed. She is aware of treatment planning.     OBJECTIVE      Medications    Current Facility-Administered Medications: nicotine (NICODERM CQ) 21 MG/24HR 1 patch, 1 patch, TransDERmal, Daily  vancomycin (VANCOCIN) 1,000 mg in dextrose 5 % 250 mL IVPB, 1,000 mg, IntraVENous, Q24H  benztropine (COGENTIN) tablet 1 mg, 1 mg, Oral, BID  busPIRone (BUSPAR) tablet 10 mg, 10 mg, Oral, BID  traZODone (DESYREL) tablet 50 mg, 50 mg, Oral, Nightly PRN  sodium chloride flush 0.9 % injection 5-40 mL, 5-40 mL, IntraVENous, 2 times per day  sodium chloride flush 0.9 % injection 5-40 mL, 5-40 mL, IntraVENous, PRN  0.9 % sodium chloride infusion, 25 mL, IntraVENous, PRN  enoxaparin (LOVENOX) injection 30 mg, 30 mg, SubCUTAneous, BID  ondansetron (ZOFRAN-ODT) disintegrating tablet 4 mg, 4 mg, Oral, Q8H PRN **OR** ondansetron (ZOFRAN) injection 4 mg, 4 mg, IntraVENous, Q6H PRN  polyethylene glycol (GLYCOLAX) packet 17 g, 17 g, Oral, Daily PRN  acetaminophen (TYLENOL) tablet 650 mg, 650 mg, Oral, Q6H PRN **OR** acetaminophen (TYLENOL) suppository 650 mg, 650 mg, Rectal, Q6H PRN  dexamethasone (DECADRON) injection 6 mg, 6 mg, IntraVENous, Q24H  Vitamin D (CHOLECALCIFEROL) tablet 2,000 Units, 2,000 Units, Oral, Daily  guaiFENesin-dextromethorphan (ROBITUSSIN DM) 100-10 MG/5ML syrup 5 mL, 5 mL, Oral, Q4H PRN  albuterol (PROVENTIL) nebulizer solution 2.5 mg, 2.5 mg, Nebulization, Q6H PRN  baricitinib (OLUMIANT) tablet 2 mg, 2 mg, Oral, Daily  lurasidone (LATUDA) tablet 40 mg, 40 mg, Oral, Daily  clonazePAM (KLONOPIN) tablet 1 mg, 1 mg, Oral, BID PRN  gabapentin (NEURONTIN) capsule 600 mg, 600 mg, Oral, BID  FLUoxetine (PROZAC) capsule 60 mg, 60 mg, Oral, Daily  albuterol-ipratropium (COMBIVENT RESPIMAT)  MCG/ACT inhaler 1 puff, 1 puff, Inhalation, Q6H  fluticasone (FLOVENT HFA) 110 MCG/ACT inhaler 1 puff, 1 puff, Inhalation, BID  Physical    VITALS:  /69   Pulse 76   Temp 98 °F (36.7 °C) (Infrared)   Resp 20   Ht 5' 11\" (1.803 m)   Wt 145 lb (65.8 kg)   LMP  (LMP Unknown)   SpO2 98%   BMI 20.22 kg/m²   CONSTITUTIONAL:  awake, alert, cooperative, no apparent distress, and appears stated age  EYES:  Lids and lashes normal, pupils equal, round and reactive to light, extra ocular muscles intact, sclera clear, conjunctiva normal  ENT:  normocepalic, without obvious abnormality  HEMATOLOGIC/LYMPHATICS:  no cervical lymphadenopathy  BACK:  no curvature  LUNGS:  no increased work of breathing and diminished breath sounds throughout lungs  CARDIOVASCULAR:  normal apical pulses and normal S1 and S2  ABDOMEN:  non-distended, non-tender and no masses palpated  MUSCULOSKELETAL:  there is no redness, warmth, or swelling of the joints  NEUROLOGIC:  Mental Status Exam:  Level of Alertness:   awake  Orientation:   person, place, time  SKIN:  no bruising or bleeding  Data    CBC:   Lab Results   Component Value Date    WBC 2.9 11/04/2021    RBC 3.78 11/04/2021    HGB 12.2 11/04/2021    HCT 38.8 11/04/2021    .6 11/04/2021    MCH 32.3 11/04/2021    MCHC 31.4 11/04/2021    RDW 13.7 11/04/2021     11/04/2021    MPV 11.2 11/04/2021     BMP:    Lab Results   Component Value Date     11/04/2021    K 4.2 11/04/2021    K 4.0 11/02/2021     11/04/2021    CO2 31 11/04/2021    BUN 17 11/04/2021    LABALBU 3.5 11/04/2021    CREATININE 1.0 11/04/2021    CALCIUM 9.0 11/04/2021    GFRAA >60 11/04/2021    LABGLOM 57 11/04/2021    GLUCOSE 80 11/04/2021       ASSESSMENT AND PLAN        1. Acute respiratory failure with hypoxia ([facility] [address]  2.   Covid viral infection. 3.  Malnutrition  4. COPD with mild exacerbation. 5.  Abdominal pain/neck pain/headache:  6. Bacteremia: Suspected to be contaminant. 7.  Schizophrenia/depression:  8. Trichomatis    Plans:  CT of the head neck and abdominal CT to evaluate etiology of pain. Continue Decadron  Possible discontinuation of Vanco as bacteremia was suspected to be related to contaminant. Added Ensure Plus depression medication.   Rest of management

## 2021-11-05 NOTE — CARE COORDINATION
SS NOTE: COVID POSITIVE 11/1. PT / OT saw pt today and suggest SNF. SW made contact with pt today. SW discussed SNF placement and choices. Pt agrees and have chosen [facility] and [address] [place] [address]. SW completed a referral there today and await a response. For the SNF placement pt will need a PRECERT, signed CECILIA, current PT/OT notes and SW will need to complete the HENs. Minerva Minor.  11/5/2021.5:26 PM,

## 2021-11-05 NOTE — PROGRESS NOTES
Physician Progress Note      PATIENT:               Caprice Castle  CSN #:                  [id number]  :                       1960  ADMIT DATE:       2021 8:52 AM  DISCH DATE:  RESPONDING  PROVIDER #:        Bruce Hall MD          QUERY TEXT:    Pt admitted with COVID-19 and noted to have elevated CRP, Procalcitonin, Temp,   HR, RR, intermittent low BP and decreased WBC. If possible, please document   in progress notes and discharge summary if you are evaluating and/or treating: The medical record reflects the following:  Risk Factors: COVID-19 infection, Acute respiratory failure, WILLIE,  Clinical Indicators: On admission: CRP 8.9, Procalcitonin 0.21, BC CONS, BP   149/84- 93/58, , RR 28, Temp 103.1. 11/4 WBC 2.9. Treatment: NS 1L bolus in ER, IV Rocephin, Vanc, Flagyl, Olumiant, Decadron,    Inhalers, Nebs. Thank you, Teddy Geiger RN [phone]  Options provided:  -- Sepsis present on admission due to COVID-19 infection  -- Sepsis not present on admission due to COVID-19 infection  -- Sepsis present on admission due to COVID-19 pneumonia  -- Sepsis not present on admission due to COVID-19 pneumonia  -- Covid-19 infection without sepsis  -- Covid-19 pneumonia without sepsis  -- Other - I will add my own diagnosis  -- Disagree - Not applicable / Not valid  -- Disagree - Clinically unable to determine / Unknown  -- Refer to Clinical Documentation Reviewer    PROVIDER RESPONSE TEXT:    This patient has Covid-19 infection without sepsis.     Query created by: Feli Rodriguez on 2021 3:13 PM      Electronically signed by:  Bruce Hall MD 2021 3:17 PM

## 2021-11-06 LAB
ALBUMIN SERPL-MCNC: 3.2 G/DL (ref 3.5–5.2)
ALP BLD-CCNC: 71 U/L (ref 35–104)
ALT SERPL-CCNC: 24 U/L (ref 0–32)
ANION GAP SERPL CALCULATED.3IONS-SCNC: 7 MMOL/L (ref 7–16)
AST SERPL-CCNC: 38 U/L (ref 0–31)
BASOPHILS ABSOLUTE: 0 E9/L (ref 0–0.2)
BASOPHILS RELATIVE PERCENT: 0 % (ref 0–2)
BILIRUB SERPL-MCNC: 0.2 MG/DL (ref 0–1.2)
BLOOD CULTURE, ROUTINE: NORMAL
BUN BLDV-MCNC: 20 MG/DL (ref 6–20)
CALCIUM SERPL-MCNC: 8.8 MG/DL (ref 8.6–10.2)
CHLORIDE BLD-SCNC: 97 MMOL/L (ref 98–107)
CO2: 33 MMOL/L (ref 22–29)
CREAT SERPL-MCNC: 0.8 MG/DL (ref 0.5–1)
CULTURE, RESPIRATORY: NORMAL
EOSINOPHILS ABSOLUTE: 0.01 E9/L (ref 0.05–0.5)
EOSINOPHILS RELATIVE PERCENT: 0.3 % (ref 0–6)
GFR AFRICAN AMERICAN: >60
GFR NON-AFRICAN AMERICAN: >60 ML/MIN/1.73
GLUCOSE BLD-MCNC: 74 MG/DL (ref 74–99)
HCT VFR BLD CALC: 37.8 % (ref 34–48)
HEMOGLOBIN: 12.2 G/DL (ref 11.5–15.5)
IMMATURE GRANULOCYTES #: 0.02 E9/L
IMMATURE GRANULOCYTES %: 0.6 % (ref 0–5)
LYMPHOCYTES ABSOLUTE: 0.94 E9/L (ref 1.5–4)
LYMPHOCYTES RELATIVE PERCENT: 28.8 % (ref 20–42)
MCH RBC QN AUTO: 32.4 PG (ref 26–35)
MCHC RBC AUTO-ENTMCNC: 32.3 % (ref 32–34.5)
MCV RBC AUTO: 100.3 FL (ref 80–99.9)
MONOCYTES ABSOLUTE: 0.3 E9/L (ref 0.1–0.95)
MONOCYTES RELATIVE PERCENT: 9.2 % (ref 2–12)
NEUTROPHILS ABSOLUTE: 1.99 E9/L (ref 1.8–7.3)
NEUTROPHILS RELATIVE PERCENT: 61.1 % (ref 43–80)
PDW BLD-RTO: 13.7 FL (ref 11.5–15)
PLATELET # BLD: 227 E9/L (ref 130–450)
PMV BLD AUTO: 11.6 FL (ref 7–12)
POTASSIUM SERPL-SCNC: 4.4 MMOL/L (ref 3.5–5)
RBC # BLD: 3.77 E12/L (ref 3.5–5.5)
SMEAR, RESPIRATORY: NORMAL
SODIUM BLD-SCNC: 137 MMOL/L (ref 132–146)
TOTAL PROTEIN: 6.3 G/DL (ref 6.4–8.3)
WBC # BLD: 3.3 E9/L (ref 4.5–11.5)

## 2021-11-06 PROCEDURE — 99232 SBSQ HOSP IP/OBS MODERATE 35: CPT | Performed by: INTERNAL MEDICINE

## 2021-11-06 PROCEDURE — 6370000000 HC RX 637 (ALT 250 FOR IP): Performed by: HOSPITALIST

## 2021-11-06 PROCEDURE — 6360000002 HC RX W HCPCS: Performed by: INTERNAL MEDICINE

## 2021-11-06 PROCEDURE — 94640 AIRWAY INHALATION TREATMENT: CPT

## 2021-11-06 PROCEDURE — 6370000000 HC RX 637 (ALT 250 FOR IP): Performed by: INTERNAL MEDICINE

## 2021-11-06 PROCEDURE — 85025 COMPLETE CBC W/AUTO DIFF WBC: CPT

## 2021-11-06 PROCEDURE — 2060000000 HC ICU INTERMEDIATE R&B

## 2021-11-06 PROCEDURE — 80053 COMPREHEN METABOLIC PANEL: CPT

## 2021-11-06 PROCEDURE — 93005 ELECTROCARDIOGRAM TRACING: CPT | Performed by: INTERNAL MEDICINE

## 2021-11-06 PROCEDURE — 36415 COLL VENOUS BLD VENIPUNCTURE: CPT

## 2021-11-06 PROCEDURE — 2580000003 HC RX 258: Performed by: INTERNAL MEDICINE

## 2021-11-06 RX ORDER — LEVOFLOXACIN 5 MG/ML
500 INJECTION, SOLUTION INTRAVENOUS EVERY 24 HOURS
Status: DISCONTINUED | OUTPATIENT
Start: 2021-11-06 | End: 2021-11-10

## 2021-11-06 RX ADMIN — GABAPENTIN 600 MG: 300 CAPSULE ORAL at 09:19

## 2021-11-06 RX ADMIN — LURASIDONE HYDROCHLORIDE 40 MG: 40 TABLET, FILM COATED ORAL at 09:18

## 2021-11-06 RX ADMIN — BUSPIRONE HYDROCHLORIDE 10 MG: 10 TABLET ORAL at 20:31

## 2021-11-06 RX ADMIN — ENOXAPARIN SODIUM 30 MG: 100 INJECTION SUBCUTANEOUS at 20:32

## 2021-11-06 RX ADMIN — FLUTICASONE PROPIONATE 1 PUFF: 110 AEROSOL, METERED RESPIRATORY (INHALATION) at 06:51

## 2021-11-06 RX ADMIN — VANCOMYCIN HYDROCHLORIDE 1000 MG: 1 INJECTION, POWDER, LYOPHILIZED, FOR SOLUTION INTRAVENOUS at 09:20

## 2021-11-06 RX ADMIN — LEVOFLOXACIN 500 MG: 5 INJECTION, SOLUTION INTRAVENOUS at 20:31

## 2021-11-06 RX ADMIN — IPRATROPIUM BROMIDE AND ALBUTEROL 1 PUFF: 20; 100 SPRAY, METERED RESPIRATORY (INHALATION) at 17:57

## 2021-11-06 RX ADMIN — BARICITINIB 2 MG: 2 TABLET, FILM COATED ORAL at 09:18

## 2021-11-06 RX ADMIN — IPRATROPIUM BROMIDE AND ALBUTEROL 1 PUFF: 20; 100 SPRAY, METERED RESPIRATORY (INHALATION) at 06:51

## 2021-11-06 RX ADMIN — DEXAMETHASONE SODIUM PHOSPHATE 6 MG: 10 INJECTION INTRAMUSCULAR; INTRAVENOUS at 09:19

## 2021-11-06 RX ADMIN — TRAZODONE HYDROCHLORIDE 50 MG: 50 TABLET ORAL at 20:31

## 2021-11-06 RX ADMIN — BENZTROPINE MESYLATE 1 MG: 1 TABLET ORAL at 20:35

## 2021-11-06 RX ADMIN — ACETAMINOPHEN 650 MG: 325 TABLET ORAL at 09:17

## 2021-11-06 RX ADMIN — IPRATROPIUM BROMIDE AND ALBUTEROL 1 PUFF: 20; 100 SPRAY, METERED RESPIRATORY (INHALATION) at 22:44

## 2021-11-06 RX ADMIN — Medication 10 ML: at 20:32

## 2021-11-06 RX ADMIN — Medication 10 ML: at 09:20

## 2021-11-06 RX ADMIN — Medication 2000 UNITS: at 09:18

## 2021-11-06 RX ADMIN — GABAPENTIN 600 MG: 300 CAPSULE ORAL at 20:31

## 2021-11-06 RX ADMIN — ENOXAPARIN SODIUM 30 MG: 100 INJECTION SUBCUTANEOUS at 09:18

## 2021-11-06 RX ADMIN — FLUTICASONE PROPIONATE 1 PUFF: 110 AEROSOL, METERED RESPIRATORY (INHALATION) at 17:57

## 2021-11-06 RX ADMIN — BUSPIRONE HYDROCHLORIDE 10 MG: 10 TABLET ORAL at 09:18

## 2021-11-06 RX ADMIN — IPRATROPIUM BROMIDE AND ALBUTEROL 1 PUFF: 20; 100 SPRAY, METERED RESPIRATORY (INHALATION) at 11:57

## 2021-11-06 RX ADMIN — BENZTROPINE MESYLATE 1 MG: 1 TABLET ORAL at 09:18

## 2021-11-06 RX ADMIN — FLUOXETINE 60 MG: 20 CAPSULE ORAL at 09:18

## 2021-11-06 ASSESSMENT — PAIN SCALES - GENERAL
PAINLEVEL_OUTOF10: 10
PAINLEVEL_OUTOF10: 6

## 2021-11-06 NOTE — PROGRESS NOTES
Department of Internal Medicine  General Internal Medicine  Attending Progress Note  Chief Complaint   Patient presents with    Shortness of Breath     hx COPD and Asthma, states always SOB but worse today.  Cough     SUBJECTIVE:    Reports that she's doing well. No fever or chills.  No chest.     OBJECTIVE      Medications    Current Facility-Administered Medications: [START ON 11/7/2021] baricitinib (OLUMIANT) tablet 4 mg, 4 mg, Oral, Daily  levoFLOXacin (LEVAQUIN) 500 MG/100ML infusion 500 mg, 500 mg, IntraVENous, Q24H  nicotine (NICODERM CQ) 21 MG/24HR 1 patch, 1 patch, TransDERmal, Daily  vancomycin (VANCOCIN) 1,000 mg in dextrose 5 % 250 mL IVPB, 1,000 mg, IntraVENous, Q24H  benztropine (COGENTIN) tablet 1 mg, 1 mg, Oral, BID  busPIRone (BUSPAR) tablet 10 mg, 10 mg, Oral, BID  traZODone (DESYREL) tablet 50 mg, 50 mg, Oral, Nightly PRN  sodium chloride flush 0.9 % injection 5-40 mL, 5-40 mL, IntraVENous, 2 times per day  sodium chloride flush 0.9 % injection 5-40 mL, 5-40 mL, IntraVENous, PRN  0.9 % sodium chloride infusion, 25 mL, IntraVENous, PRN  enoxaparin (LOVENOX) injection 30 mg, 30 mg, SubCUTAneous, BID  ondansetron (ZOFRAN-ODT) disintegrating tablet 4 mg, 4 mg, Oral, Q8H PRN **OR** ondansetron (ZOFRAN) injection 4 mg, 4 mg, IntraVENous, Q6H PRN  polyethylene glycol (GLYCOLAX) packet 17 g, 17 g, Oral, Daily PRN  acetaminophen (TYLENOL) tablet 650 mg, 650 mg, Oral, Q6H PRN **OR** acetaminophen (TYLENOL) suppository 650 mg, 650 mg, Rectal, Q6H PRN  dexamethasone (DECADRON) injection 6 mg, 6 mg, IntraVENous, Q24H  Vitamin D (CHOLECALCIFEROL) tablet 2,000 Units, 2,000 Units, Oral, Daily  guaiFENesin-dextromethorphan (ROBITUSSIN DM) 100-10 MG/5ML syrup 5 mL, 5 mL, Oral, Q4H PRN  albuterol (PROVENTIL) nebulizer solution 2.5 mg, 2.5 mg, Nebulization, Q6H PRN  lurasidone (LATUDA) tablet 40 mg, 40 mg, Oral, Daily  clonazePAM (KLONOPIN) tablet 1 mg, 1 mg, Oral, BID PRN  gabapentin (NEURONTIN) capsule 600 mg, 600 mg, Oral, BID  FLUoxetine (PROZAC) capsule 60 mg, 60 mg, Oral, Daily  albuterol-ipratropium (COMBIVENT RESPIMAT)  MCG/ACT inhaler 1 puff, 1 puff, Inhalation, Q6H  fluticasone (FLOVENT HFA) 110 MCG/ACT inhaler 1 puff, 1 puff, Inhalation, BID  Physical    VITALS:  /66   Pulse 110   Temp 97.2 °F (36.2 °C) (Infrared)   Resp 20   Ht 5' 11\" (1.803 m)   Wt 145 lb (65.8 kg)   LMP  (LMP Unknown)   SpO2 93%   BMI 20.22 kg/m²   CONSTITUTIONAL:  awake  EYES:  extra-ocular muscles intact and vision intact  ENT:  atraumatic  HEMATOLOGIC/LYMPHATICS:  no cervical lymphadenopathy  LUNGS:  no increased work of breathing and no crackles or wheezing  CARDIOVASCULAR:  normal apical pulses and normal S1 and S2  ABDOMEN:  soft, non-distended and no masses palpated  MUSCULOSKELETAL:  there is no redness, warmth, or swelling of the joints  NEUROLOGIC:  Mental Status Exam:  Level of Alertness:   awake  Orientation:   person, place, time  SKIN:  no bruising or bleeding  Data    CBC:   Lab Results   Component Value Date    WBC 3.3 11/06/2021    RBC 3.77 11/06/2021    HGB 12.2 11/06/2021    HCT 37.8 11/06/2021    .3 11/06/2021    MCH 32.4 11/06/2021    MCHC 32.3 11/06/2021    RDW 13.7 11/06/2021     11/06/2021    MPV 11.6 11/06/2021     BMP:    Lab Results   Component Value Date     11/06/2021    K 4.4 11/06/2021    K 4.0 11/02/2021    CL 97 11/06/2021    CO2 33 11/06/2021    BUN 20 11/06/2021    LABALBU 3.2 11/06/2021    CREATININE 0.8 11/06/2021    CALCIUM 8.8 11/06/2021    GFRAA >60 11/06/2021    LABGLOM >60 11/06/2021    GLUCOSE 74 11/06/2021       ASSESSMENT AND PLAN      1. Acute respiratory failure with hypoxia:  2. Covid 19 Viral Infection;  3.  Schizophrenia/Depression:  4. COPD with Exacerbation  5. Malnutrition:  6. Abdominal pain/neck Pain/headache: CT scan of the head   7. Trichomatis:    Plans:  IT scan of the negative for fracture or acute abdomen.   Plan to discharge to SNF  Continue ensure plus  Continue to monitor  Add Levaquin  Will discontinue Vanco.

## 2021-11-06 NOTE — PROGRESS NOTES
[facility]  Progress Note     CHIEF COMPLAINT       Chief Complaint   Patient presents with    Shortness of Breath     hx COPD and Asthma, states always SOB but worse today.      Cough     HISTORYOF PRESENT ILLNESS   PT CAME IN WITH COUGH FEVERS, HA CHEST PAIN AND ABD PAIN   PT WA SON 6L  PT IS CURRENTLY ON ROOM AIR  SHE DENIES EXPOSURE  WBC 9.5 CR1.2 COVID +  ID WAS ASKED TO SEE FOR BACTEREMIA CONS  CURRENTYL AFEBRILE  PT HAS ORTHOPEDIC  HARDWARE   PT IS SEXUALLY ACTIVE  PT HAS NO APPETITE DOES NOT WANT TO EAT   LIVES ALONE    CURRENT VISIT  11/06/21   On room air   Feels better  Tolerating medications  Has no c/o f/c/n/v/d     REVIEW OF SYSTEMS     Negative except as above     CURRENT MEDICATIONS     Current Facility-Administered Medications:     [START ON 11/7/2021] baricitinib (OLUMIANT) tablet 4 mg, 4 mg, Oral, Daily, Cristi Brewer MD    nicotine (NICODERM CQ) 21 MG/24HR 1 patch, 1 patch, TransDERmal, Daily, Melchor Hernandez MD, 1 patch at 11/06/21 0917    vancomycin (VANCOCIN) 1,000 mg in dextrose 5 % 250 mL IVPB, 1,000 mg, IntraVENous, Q24H, Susan Lantigua MD, Stopped at 11/06/21 1059    benztropine (COGENTIN) tablet 1 mg, 1 mg, Oral, BID, Susan Lantigua MD, 1 mg at 11/06/21 0918    busPIRone (BUSPAR) tablet 10 mg, 10 mg, Oral, BID, Susan Lantigua MD, 10 mg at 11/06/21 0918    traZODone (DESYREL) tablet 50 mg, 50 mg, Oral, Nightly PRN, Susan Lantigua MD, 50 mg at 11/04/21 2256    sodium chloride flush 0.9 % injection 5-40 mL, 5-40 mL, IntraVENous, 2 times per day, Susan Lantigua MD, 10 mL at 11/06/21 0920    sodium chloride flush 0.9 % injection 5-40 mL, 5-40 mL, IntraVENous, PRN, Carlos Alberto Brewer MD    0.9 % sodium chloride infusion, 25 mL, IntraVENous, PRN, Susan Lantigua MD    enoxaparin (LOVENOX) injection 30 mg, 30 mg, SubCUTAneous, BID, Susan Lantigua MD, 30 mg at 11/06/21 0918    ondansetron (ZOFRAN-ODT) disintegrating DETECTED (A)          PHYSICAL EXAM          Vitals:    11/06/21 0652 11/06/21 0745 11/06/21 1158 11/06/21 1400   BP:  121/67     Pulse:  64     Resp: 19 18 25 22   Temp:  98.2 °F (36.8 °C)  97.2 °F (36.2 °C)   TempSrc:  Infrared  Infrared   SpO2: 95% 94% 92% 93%   Weight:       Height:         Physical Exam   Constitutional/General: Alert   NAD THIN   Head: NC/AT  Eyes: PERRL, EOMI    Pulmonary: Lungs DEC  to auscultation bilaterally POST . Not in respiratory distress ON RA   Cardiovascular:  Regular rate and rhythm,    Abdomen: Soft, + BS. No distension. Nontender. Extremities: Moves all extremities x 4. Warm and well perfused  Skin: Warm and dry without rash  Neurologic:    No focal deficits  Psych: FLAT  Affect     DIAGNOSTIC RESULTS   RADIOLOGY:   XR CHEST PORTABLE    Result Date: 11/1/2021  EXAMINATION: ONE XRAY VIEW OF THE CHEST 11/1/2021 8:22 am COMPARISON: 09/14/2021 HISTORY: ORDERING SYSTEM PROVIDED HISTORY: cough TECHNOLOGIST PROVIDED HISTORY: Reason for exam:->cough FINDINGS: Cardiomediastinal silhouette is unremarkable. No airspace infiltrate, effusion, or pneumothorax is seen. No acute osseous abnormality is identified. No radiographic evidence of acute cardiopulmonary disease process     LABS  Recent Labs     11/04/21  0737 11/06/21 0619   WBC 2.9* 3.3*   HGB 12.2 12.2   HCT 38.8 37.8   .6* 100.3*    227     Recent Labs     11/04/21  0737 11/04/21  0737 11/06/21  0619     --  137   K 4.2   < > 4.4      < > 97*   CO2 31*   < > 33*   BUN 17  --  20   CREATININE 1.0  --  0.8   GFRAA >60  --  >60   LABGLOM 57  --  >60   GLUCOSE 80  --  74   PROT 6.5  --  6.3*   LABALBU 3.5  --  3.2*   CALCIUM 9.0  --  8.8   BILITOT 0.2  --  0.2   ALKPHOS 76  --  71   AST 20  --  38*   ALT 13  --  24    < > = values in this interval not displayed.      Recent Labs     11/04/21  0737   PROCAL 0.11*     Lab Results   Component Value Date    CRP 2.4 (H) 11/03/2021    CRP 6.2 (H) 11/02/2021    CRP 5.2 (H) 11/02/2021     Lab Results   Component Value Date    SEDRATE 45 (H) 11/01/2021     No results found for: [id number]  Lab Results   Component Value Date    COVID19 DETECTED 11/01/2021     COVID-19/REBEKAH-COV2 LABS  Recent Labs     11/04/21  0737 11/06/21  0619   PROCAL 0.11*  --    AST 20 38*   ALT 13 24       MICROBIOLOGY:     Cultures :   Lab Results   Component Value Date    BC 24 Hours no growth 11/04/2021    BC 24 Hours no growth 11/01/2021    BC 5 Days no growth 09/14/2021    ORG Staphylococcus coagulase-negative 11/01/2021     Lab Results   Component Value Date    BLOODCULT2 24 Hours no growth 11/04/2021    BLOODCULT2  11/01/2021     Gram stain performed from blood culture bottle media  Gram positive cocci in clusters      BLOODCULT2  11/01/2021     This organism was isolated in one set. Susceptibility testing is not routinely done as this  organism frequently represents skin contamination. Additional testing can be ordered by calling the  Microbiology Department. ORG Staphylococcus coagulase-negative 11/01/2021       No results found for: WNDABS    Smear, Respiratory   Date Value Ref Range Status   11/04/2021   Final    Group 6: <25 PMN's/LPF and <25 Epithelial cells/LPF  Polymorphonuclear leukocytes not seen  Epithelial cells not seen  Rare Gram positive cocci       No results found for: MPNEUMO, CLAMYDCU, LABLEGI, AFBCX, FUNGSM, LABFUNG  CULTURE, RESPIRATORY   Date Value Ref Range Status   11/04/2021 Oral Pharyngeal Kathleen present  Final        Urine Culture, Routine   Date Value Ref Range Status   11/01/2021 <10,000 CFU/mL  Mixed gram positive organisms    Final        FINAL IMPRESSION    Patient is a 63 y.o. female who presented with   Chief Complaint   Patient presents with    Shortness of Breath     hx COPD and Asthma, states always SOB but worse today.      Cough    and admitted for WILLIE (acute kidney injury) ([facility] [address] [N17.9]  Acute respiratory failure with hypoxia ([facility] [address] [J96.01]  COVID [U07.1] PT WAS ON 6L WEANED TO RA HAS HYPOXEMIA   Fevers better  Gram positive cocci in clusters looks CONS contaminant   Urine GPO WITH TRICHOMONIASIS s/p flagyl  LEUKOPENIA      REPEAT BLOOD CX ngtd  CHECK URINE GC/CHLAMYDIA  pending   HIV/HEP C NR    vancomycin (VANCOCIN) 1,000 mg in dextrose 5 % 250 mL IVPB, Q24H   S/P FLAGYL  baricitinib (OLUMIANT) tablet 2 mg, Daily     Labs, cultures, imaging reviewed  From ID POV, can DC   Looking at SNF? Electronically signed by CHICHO Meraz NP on 11/6/2021 at 2:09 PM    I have discussed the case, including pertinent history and physical  exam findings . I have seen and examined the patient and the key elements of the encounter have been performed by me. I agree with the assessment, plan and orders as documented.       Treatment plan as per my recommendation     Suresh Rankin MD, FACP  11/6/2021  4:09 PM

## 2021-11-07 ENCOUNTER — APPOINTMENT (OUTPATIENT)
Dept: GENERAL RADIOLOGY | Age: 61
DRG: 137 | End: 2021-11-07
Payer: COMMERCIAL

## 2021-11-07 LAB
EKG ATRIAL RATE: 80 BPM
EKG Q-T INTERVAL: 350 MS
EKG QRS DURATION: 82 MS
EKG QTC CALCULATION (BAZETT): 469 MS
EKG R AXIS: 75 DEGREES
EKG T AXIS: 82 DEGREES
EKG VENTRICULAR RATE: 108 BPM

## 2021-11-07 PROCEDURE — 97530 THERAPEUTIC ACTIVITIES: CPT

## 2021-11-07 PROCEDURE — 2060000000 HC ICU INTERMEDIATE R&B

## 2021-11-07 PROCEDURE — 6360000002 HC RX W HCPCS: Performed by: INTERNAL MEDICINE

## 2021-11-07 PROCEDURE — 6370000000 HC RX 637 (ALT 250 FOR IP): Performed by: INTERNAL MEDICINE

## 2021-11-07 PROCEDURE — 6370000000 HC RX 637 (ALT 250 FOR IP): Performed by: HOSPITALIST

## 2021-11-07 PROCEDURE — 97535 SELF CARE MNGMENT TRAINING: CPT

## 2021-11-07 PROCEDURE — 99232 SBSQ HOSP IP/OBS MODERATE 35: CPT | Performed by: INTERNAL MEDICINE

## 2021-11-07 PROCEDURE — 94640 AIRWAY INHALATION TREATMENT: CPT

## 2021-11-07 PROCEDURE — 71045 X-RAY EXAM CHEST 1 VIEW: CPT

## 2021-11-07 PROCEDURE — 93010 ELECTROCARDIOGRAM REPORT: CPT | Performed by: INTERNAL MEDICINE

## 2021-11-07 PROCEDURE — 2580000003 HC RX 258: Performed by: INTERNAL MEDICINE

## 2021-11-07 PROCEDURE — XW0DXM6 INTRODUCTION OF BARICITINIB INTO MOUTH AND PHARYNX, EXTERNAL APPROACH, NEW TECHNOLOGY GROUP 6: ICD-10-PCS | Performed by: INTERNAL MEDICINE

## 2021-11-07 RX ADMIN — ONDANSETRON 4 MG: 4 TABLET, ORALLY DISINTEGRATING ORAL at 21:17

## 2021-11-07 RX ADMIN — GUAIFENESIN AND DEXTROMETHORPHAN 5 ML: 100; 10 SYRUP ORAL at 21:17

## 2021-11-07 RX ADMIN — LEVOFLOXACIN 500 MG: 5 INJECTION, SOLUTION INTRAVENOUS at 20:53

## 2021-11-07 RX ADMIN — LURASIDONE HYDROCHLORIDE 40 MG: 40 TABLET, FILM COATED ORAL at 07:58

## 2021-11-07 RX ADMIN — GABAPENTIN 600 MG: 300 CAPSULE ORAL at 07:58

## 2021-11-07 RX ADMIN — Medication 2000 UNITS: at 17:18

## 2021-11-07 RX ADMIN — Medication 10 ML: at 20:54

## 2021-11-07 RX ADMIN — FLUOXETINE 60 MG: 20 CAPSULE ORAL at 07:59

## 2021-11-07 RX ADMIN — BENZTROPINE MESYLATE 1 MG: 1 TABLET ORAL at 21:18

## 2021-11-07 RX ADMIN — BUSPIRONE HYDROCHLORIDE 10 MG: 10 TABLET ORAL at 21:18

## 2021-11-07 RX ADMIN — BARICITINIB 4 MG: 2 TABLET, FILM COATED ORAL at 08:00

## 2021-11-07 RX ADMIN — GABAPENTIN 600 MG: 300 CAPSULE ORAL at 21:18

## 2021-11-07 RX ADMIN — TRAZODONE HYDROCHLORIDE 50 MG: 50 TABLET ORAL at 21:18

## 2021-11-07 RX ADMIN — IPRATROPIUM BROMIDE AND ALBUTEROL 1 PUFF: 20; 100 SPRAY, METERED RESPIRATORY (INHALATION) at 18:31

## 2021-11-07 RX ADMIN — Medication 10 ML: at 08:00

## 2021-11-07 RX ADMIN — FLUTICASONE PROPIONATE 1 PUFF: 110 AEROSOL, METERED RESPIRATORY (INHALATION) at 06:57

## 2021-11-07 RX ADMIN — ACETAMINOPHEN 650 MG: 325 TABLET ORAL at 21:17

## 2021-11-07 RX ADMIN — IPRATROPIUM BROMIDE AND ALBUTEROL 1 PUFF: 20; 100 SPRAY, METERED RESPIRATORY (INHALATION) at 12:17

## 2021-11-07 RX ADMIN — FLUTICASONE PROPIONATE 1 PUFF: 110 AEROSOL, METERED RESPIRATORY (INHALATION) at 18:31

## 2021-11-07 RX ADMIN — DEXAMETHASONE SODIUM PHOSPHATE 6 MG: 10 INJECTION INTRAMUSCULAR; INTRAVENOUS at 07:58

## 2021-11-07 RX ADMIN — BENZTROPINE MESYLATE 1 MG: 1 TABLET ORAL at 08:00

## 2021-11-07 RX ADMIN — IPRATROPIUM BROMIDE AND ALBUTEROL 1 PUFF: 20; 100 SPRAY, METERED RESPIRATORY (INHALATION) at 06:56

## 2021-11-07 RX ADMIN — BENZOCAINE 6 MG-MENTHOL 10 MG LOZENGES 1 LOZENGE: at 21:18

## 2021-11-07 RX ADMIN — IPRATROPIUM BROMIDE AND ALBUTEROL 1 PUFF: 20; 100 SPRAY, METERED RESPIRATORY (INHALATION) at 23:14

## 2021-11-07 RX ADMIN — BUSPIRONE HYDROCHLORIDE 10 MG: 10 TABLET ORAL at 07:59

## 2021-11-07 RX ADMIN — ENOXAPARIN SODIUM 30 MG: 100 INJECTION SUBCUTANEOUS at 21:20

## 2021-11-07 RX ADMIN — ENOXAPARIN SODIUM 30 MG: 100 INJECTION SUBCUTANEOUS at 07:59

## 2021-11-07 ASSESSMENT — PAIN - FUNCTIONAL ASSESSMENT: PAIN_FUNCTIONAL_ASSESSMENT: ACTIVITIES ARE NOT PREVENTED

## 2021-11-07 ASSESSMENT — PAIN DESCRIPTION - LOCATION
LOCATION_2: HEAD
LOCATION_4: CHEST
LOCATION_3: THROAT
LOCATION: NECK

## 2021-11-07 ASSESSMENT — PAIN DESCRIPTION - ORIENTATION: ORIENTATION_4: RIGHT;LEFT;UPPER

## 2021-11-07 ASSESSMENT — PAIN DESCRIPTION - FREQUENCY
FREQUENCY: CONTINUOUS
FREQUENCY_4: CONTINUOUS

## 2021-11-07 ASSESSMENT — PAIN DESCRIPTION - PAIN TYPE
TYPE_3: ACUTE PAIN
TYPE: ACUTE PAIN
TYPE_4: ACUTE PAIN
TYPE_2: ACUTE PAIN

## 2021-11-07 ASSESSMENT — PAIN SCALES - GENERAL: PAINLEVEL_OUTOF10: 10

## 2021-11-07 ASSESSMENT — PAIN DESCRIPTION - ONSET: ONSET: ON-GOING

## 2021-11-07 ASSESSMENT — PAIN DESCRIPTION - DESCRIPTORS
DESCRIPTORS: ACHING
DESCRIPTORS_2: HEADACHE

## 2021-11-07 ASSESSMENT — PAIN DESCRIPTION - PROGRESSION
CLINICAL_PROGRESSION: GRADUALLY WORSENING
CLINICAL_PROGRESSION_4: GRADUALLY IMPROVING

## 2021-11-07 ASSESSMENT — PAIN DESCRIPTION - INTENSITY
RATING_2: 10
RATING_4: 9
RATING_3: 5

## 2021-11-07 NOTE — PROGRESS NOTES
Comprehensive Nutrition Assessment    Type and Reason for Visit:  Initial, RD Nutrition Re-Screen/LOS    Nutrition Recommendations/Plan: Continue with diet, Continue with Ensure Enlive TID    Nutrition Assessment:  Pt admits w/ CoVID 19 w/ PMH of COPD, Schizophrenia. Pt tolerating meals and ONS w/ fair-good intakes. Will continue to monitor and follow    Malnutrition Assessment:  Malnutrition Status:  Insufficient data    Context:  Acute Illness     Findings of the 6 clinical characteristics of malnutrition:  Energy Intake:  Mild decrease in energy intake (Comment)  Weight Loss:  Unable to assess (np wt mehods per emr hx)     Body Fat Loss:  Unable to assess     Muscle Mass Loss:  Unable to assess    Fluid Accumulation:  No significant fluid accumulation     Strength:  Not Performed    Estimated Daily Nutrient Needs:  Energy (kcal):  8309-1211; Weight Used for Energy Requirements:  Current     Protein (g):  85-95 (x1.3-1.5gm/kg); Weight Used for Protein Requirements:  Current        Fluid (ml/day):  7839-6062; Method Used for Fluid Requirements:  1 ml/kcal      Nutrition Related Findings:  A/ox4, abd flat/soft, +BS, no edema      Wounds:  None       Current Nutrition Therapies:    ADULT DIET; Regular  ADULT ORAL NUTRITION SUPPLEMENT; Breakfast, Lunch, Dinner; Standard High Calorie/High Protein Oral Supplement    Anthropometric Measures:  · Height: 5' 11\" (180.3 cm)  · Current Body Weight: 145 lb (65.8 kg) (11/2)   · Usual Body Weight:  (per emr 131-154# no method)     · Ideal Body Weight: 155 lbs; % Ideal Body Weight 93.5 %   · BMI: 20.2  · Adjusted Body Weight:  ; No Adjustment   · BMI Categories: Normal Weight (BMI 18.5-24. 9)       Nutrition Diagnosis:   · Increased nutrient needs related to increase demand for energy/nutrients as evidenced by intake 51-75%, poor intake prior to admission      Nutrition Interventions:   Food and/or Nutrient Delivery:  Continue Current Diet, Continue Oral Nutrition Supplement  Nutrition Education/Counseling:  Education not indicated   Coordination of Nutrition Care:  No recommendation at this time    Goals:  Consume >75% meals/ONS       Nutrition Monitoring and Evaluation:   Behavioral-Environmental Outcomes:  None Identified   Food/Nutrient Intake Outcomes:  Food and Nutrient Intake, Supplement Intake  Physical Signs/Symptoms Outcomes:  Biochemical Data, Fluid Status or Edema, Hemodynamic Status, Nutrition Focused Physical Findings, Skin, Weight     Discharge Planning:     Too soon to determine     Electronically signed by Ines Vaca RD, LD on 11/7/21 at 9:58 AM EST    Contact: [phone]

## 2021-11-07 NOTE — PROGRESS NOTES
OCCUPATIONAL THERAPY BEDSIDE TREATMENT NOTE   [facility] [employer] [facility]  Roel Pichardos [place]. [place]     Date:2021  Patient Name: Maximo Benoit  MRN: [id number]  : 1960  Room: [address] [place] [address]      Evaluating OT: Cristina Burks OTR/L; [id number]        Referring Provider and Orders/Date:   OT eval and treat Start: 21, End: 21, ONE TIME, Standing Count: 1 Occurrences, R    John Paul Galarza MD     Diagnosis:   1. Acute respiratory failure with hypoxia ([facility] [address]    2. COVID    3. WILLIE (acute kidney injury) [facility])          Pertinent Medical History:        Past Medical History        Past Medical History:   Diagnosis Date    Alcoholism ([facility] [address]       H/O    COPD (chronic obstructive pulmonary disease) (HCC)      Dental caries      Hypertension       no meds    Lung nodules        [place]  Schizophrenia ([facility] [address]       stable    Ventricular hypokinesis       H/O             Past Surgical History         Past Surgical History:   Procedure Laterality Date    APPENDECTOMY         SECTION         2 C SECTION    ECHO COMPLETE   1/10/2014          LA DENTAL SURGERY PROCEDURE N/A 2018     MULTIPLE DENTAL EXTRACTIONS AND ALVELOPLASTY performed by Rodolfo Mesa DDS at [address] [place] [address] Right      WRIST SURGERY Right             Precautions:  Fall Risk, covid + with droplet    Recommended placement: REBEKAH vs HHOT if patient is able to meet goals     Assessment of current deficits     [x]? Functional mobility           [x]? ADLs           [x]? Strength                   []?Cognition     [x]? Functional transfers         [x]? IADLs         [x]? Safety Awareness   [x]? Endurance     []? Fine Coordination                        [x]? Balance      []? Vision/perception    []? Sensation       [x]? Gross Motor Coordination            []? ROM           []?  Delirium                   []? Motor Control      OT PLAN OF CARE   OT POC based on physician orders, patient diagnosis and results of clinical assessment     Frequency/Duration 1-3 days/wk for 2 weeks PRN   Specific OT Treatment Interventions to include:   * Instruction/training on adapted ADL techniques and AE recommendations to increase functional independence within precautions       * Training on energy conservation strategies, correct breathing pattern and techniques to improve independence/tolerance for self-care routine  * Functional transfer/mobility training/DME recommendations for increased independence, safety, and fall prevention  * Patient/Family education to increase follow through with safety techniques and functional independence  * Recommendation of environmental modifications for increased safety with functional transfers/mobility and ADLs  * Therapeutic exercise to improve motor endurance, ROM, and functional strength for ADLs/functional transfers  * Therapeutic activities to facilitate/challenge dynamic balance, stand tolerance for increased safety and independence with ADLs  * Therapeutic activities to facilitate gross/fine motor skills for increased independence with ADLs      Recommended Adaptive Equipment/DME: TBD       Home Living: Pt lives alone in an apartment with 3 steps. Son does live in a separate part of the apartment. Bathroom setup: tub shower with bars; standard toilet    DME owned: none      Prior Level of Function: indep with ADLs , assist with IADLs; ambulated indep   Driving: no   Occupation: none   Enjoys: grocery store, bike, walking, out to eat, going to the park with grandson, family     Pain Level: chronic in back and neck 5/10; nursing is aware per pt.    Cognition: A&O: 4/4; Follows 3 step directions              Memory:  Intact              Sequencing:  Intact              Problem solving:  Intact              Judgement/safety:  Intact     AM-PAC Daily Activity Inpatient   How much help for putting on and taking off regular lower body clothing?: A Little  How much help for Bathing?: A Little  How much help for Toileting?: A Little  How much help for putting on and taking off regular upper body clothing?: A Little  How much help for taking care of personal grooming?: A Little  How much help for eating meals?: A Little  AM-PAC Inpatient Daily Activity Raw Score: 18  AM-PAC Inpatient ADL T-Scale Score : 38.66  ADL Inpatient CMS 0-100% Score: 46.65  ADL Inpatient CMS G-Code Modifier : CK                Functional Assessment:      Initial Eval Status  Date: 11/2/2021   Treatment Status  Date: 11/7/21 STGs = LTGs  Time frame: 10-14 days   Feeding Independent   NT NA-PLOF   Grooming Stand by Assist to wash hands in standing with O2 dropping to 91%  Stand by Assist for hair grooming with increased time needed d/t reduced endurance and activity tolerance. Face wash completed. Independent    UB Dressing Stand by Assist with management of lines and impulsivity Stand by Assist   Simulated  Independent    LB Dressing Minimal Assist with socks and pants with balance and safety assist.   Minimal Assist while long sitting in bed. Independent    Bathing Stand by Assist with extended time with wipes sitting/standing from chair.   NT  Independent    Toileting Stand by Assist for safety and balance from 3:1 Minimal Assist for balance while standing at beside commode for perineal hygiene.   Independent    Bed Mobility  Supine to sit: Independent   Sit to supine: Independent    Supine to sit: Independent   Sit to supine: Independent  Not Appropriate-PLOF   Functional Transfers Stand by Assist without AD from bed, chair and 3:1 Minimal Assist to maintain balance with sit <> stand transfer from EOB and commode.   Independent    Functional Mobility Minimal Assist with loss of balance.  Recommending cane for safety for short distance functional mobility throughout the room to simulate house hold distances.     Minimal Assist with hand held assist with short distance from EOB to bedside commode. Unsteadiness noted. Independent    Balance Sitting:     Static:  good    Dynamic:fair+  Standing: fair Sitting:     Static:  good    Dynamic:fair+  Standing: fair  Sitting:     Dynamic:good  Standing: fair+   Activity Tolerance Vitals with activity: room air  Supine: 110/64 HR 73 98%  Standing : 83/72 HR 87 95%  Sittin/79 HR 78 95%  Standing with 1min tolerance.   Fair ; pt presents with fatigue this date  Increase standing tolerance for >4min with stable vital signs for carry over into toileting, functional tranfers and indep in ADLs   Visual/  Perceptual Glasses: not Present; WFL     Reports change in vision since admission: No      NA   Andrez UE Strengthening  4/5 generally   4+/5MMT generally for carry over into self care, functional transfers and functional mobility with AD.       Hand Dominance  [x]? Right   []? Left     AROM (PROM) Strength Additional Info:    RUE  WFL 4/5 good  and  FMC/dexterity noted during ADL tasks  Opposition [x]? Intact []? Impaired  Finger to nose [x]? Intact []? Impaired      LUE WFL 4/5 good  and FMC/dexterity noted during ADL tasks  Opposition [x]? Intact []? Impaired  Finger to nose [x]? Intact []? Impaired      Hearing: WFL   Sensation:  No c/o numbness or tingling   Tone: WFL   Edema: none     Vitals:  HR at rest:  HR with activity:  HR at end of session:    SpO2 at rest: 92% SpO2 with activity: 91% SpO2 at end of session: 93%   BP at rest:  BP with activity:  BP at end of session:      Comments: Upon arrival to the room the patient was supine. At end of the session, the patient was supine, alarm on. Call light and phone within reach. Pt required verbal cues and instruction as noted above for safe facilitation and completion of tasks. Therapist provided skilled monitoring of the patient's response during treatment session.  Prior to and at the end of session, environmental modifications/line management completed for patients safety and efficiency of treatment session. Overall, the patient demonstrates decreased independence with ADLs and functional transfers and mobility. Pt limited by generalized weakness, endurance, and activity tolerance. Pt would benefit from continued skilled OT to increase independence with BADL's and IADL's. Treatment: OT treatment provided this date includes:   Instruction/training on safety and adapted techniques for completion of ADLs   Instruction/training on safe functional mobility/transfer techniques   Instruction/training on energy conservation/work simplification for completion of ADLs   Instructed on spirometer use and benefits with fair return demonstration from patient       Pt has made fair progress towards set goals.  Continue with current plan of care       Treatment time includes thorough review of current medical information, gathering information on past medical history/social history and prior level of function, completion of standardized testing/informal observation of tasks, assessment of data, and development of POC/Goals.     Time In: 11:15 AM    Time Out: 11:38 AM                  Min Units   OT Eval Low 93722     OT Eval Medium 95397     OT Eval High 09533     OT Re-Eval 97140          ADL/Self Care 83417 10 1   Therapeutic Activities 24260 94 1   Therapeutic Ex 44107     Orthotic Management 91846     Neuro Re-Ed 16574     Non-Billable Time     TOTAL TIMED TREATMENT 23 2     Maddi Gibson OTR/L #[id number]

## 2021-11-07 NOTE — PLAN OF CARE
Problem: Gas Exchange - Impaired  Goal: Promote optimal lung function  Outcome: Met This Shift     Problem: Breathing Pattern - Ineffective  Goal: Ability to achieve and maintain a regular respiratory rate  Outcome: Met This Shift     Problem: Isolation Precautions - Risk of Spread of Infection  Goal: Prevent transmission of infection  Outcome: Met This Shift     Problem: Nutrition Deficits  Goal: Optimize nutritional status  Outcome: Met This Shift     Problem: Risk for Fluid Volume Deficit  Goal: Maintain normal heart rhythm  Outcome: Met This Shift

## 2021-11-07 NOTE — PROGRESS NOTES
[facility]  Progress Note     CHIEF COMPLAINT       Chief Complaint   Patient presents with    Shortness of Breath     hx COPD and Asthma, states always SOB but worse today.      Cough     SUBJECTIVE     11/07/21   On room air   No issues  No complaints   Doing well   Tolerating medications  Has no c/o f/c/n/v/d     REVIEW OF SYSTEMS     Negative except as above     CURRENT MEDICATIONS     Current Facility-Administered Medications:     baricitinib (OLUMIANT) tablet 4 mg, 4 mg, Oral, Daily, Kian Brewer MD, 4 mg at 11/07/21 0800    levoFLOXacin (LEVAQUIN) 500 MG/100ML infusion 500 mg, 500 mg, IntraVENous, Q24H, Rachael Ruiz MD, Stopped at 11/07/21 0128    nicotine (NICODERM CQ) 21 MG/24HR 1 patch, 1 patch, TransDERmal, Daily, Sarah Giron MD, 1 patch at 11/07/21 0801    benztropine (COGENTIN) tablet 1 mg, 1 mg, Oral, BID, Rachael Ruiz MD, 1 mg at 11/07/21 0800    busPIRone (BUSPAR) tablet 10 mg, 10 mg, Oral, BID, Rachael Ruiz MD, 10 mg at 11/07/21 0759    traZODone (DESYREL) tablet 50 mg, 50 mg, Oral, Nightly PRN, Rachael Ruiz MD, 50 mg at 11/06/21 2031    sodium chloride flush 0.9 % injection 5-40 mL, 5-40 mL, IntraVENous, 2 times per day, Rachael Ruiz MD, 10 mL at 11/07/21 0800    sodium chloride flush 0.9 % injection 5-40 mL, 5-40 mL, IntraVENous, PRN, Kian Brewer MD    0.9 % sodium chloride infusion, 25 mL, IntraVENous, PRN, Rachael Ruiz MD    enoxaparin (LOVENOX) injection 30 mg, 30 mg, SubCUTAneous, BID, Rachael Ruiz MD, 30 mg at 11/07/21 0759    ondansetron (ZOFRAN-ODT) disintegrating tablet 4 mg, 4 mg, Oral, Q8H PRN, 4 mg at 11/03/21 0219 **OR** ondansetron (ZOFRAN) injection 4 mg, 4 mg, IntraVENous, Q6H PRN, Rachael Ruiz MD, 4 mg at 11/03/21 1302    polyethylene glycol (GLYCOLAX) packet 17 g, 17 g, Oral, Daily PRN, Rachael Ruiz MD    acetaminophen (TYLENOL) tablet 650 mg, 650 mg, Oral, Q6H PRN, 650 mg at 11/06/21 0917 **OR** acetaminophen (TYLENOL) suppository 650 mg, 650 mg, Rectal, Q6H PRN, Tere Lund MD    dexamethasone (DECADRON) injection 6 mg, 6 mg, IntraVENous, Q24H, Tere Lund MD, 6 mg at 11/07/21 0758    Vitamin D (CHOLECALCIFEROL) tablet 2,000 Units, 2,000 Units, Oral, Daily, Tere Lund MD, 2,000 Units at 11/06/21 0918    guaiFENesin-dextromethorphan (ROBITUSSIN DM) 100-10 MG/5ML syrup 5 mL, 5 mL, Oral, Q4H PRN, Tere Lund MD, 5 mL at 11/05/21 1427    albuterol (PROVENTIL) nebulizer solution 2.5 mg, 2.5 mg, Nebulization, Q6H PRN, Tere Lund MD    lurasidone (LATUDA) tablet 40 mg, 40 mg, Oral, Daily, Tere Lund MD, 40 mg at 11/07/21 0758    clonazePAM (KLONOPIN) tablet 1 mg, 1 mg, Oral, BID PRN, Catheryn Barthel Raspovic, DO, 1 mg at 11/05/21 2057    gabapentin (NEURONTIN) capsule 600 mg, 600 mg, Oral, BID, Ke Kee DO, 600 mg at 11/07/21 0758    FLUoxetine (PROZAC) capsule 60 mg, 60 mg, Oral, Daily, Ke Kee DO, 60 mg at 11/07/21 0759    albuterol-ipratropium (COMBIVENT RESPIMAT)  MCG/ACT inhaler 1 puff, 1 puff, Inhalation, Q6H, Tere Lund MD, 1 puff at 11/07/21 1217    fluticasone (FLOVENT HFA) 110 MCG/ACT inhaler 1 puff, 1 puff, Inhalation, BID, Tere Lund MD, 1 puff at 11/07/21 0657  ALLERGIES     Codeine, Dust mite extract, Aspirin, and Penicillins  There is no immunization history for the selected administration types on file for this patient.    Internal Administration   First Dose      Second Dose           Last COVID Lab SARS-CoV-2, NAAT (no units)   Date Value   11/01/2021 DETECTED (A)          PHYSICAL EXAM          Vitals:    11/07/21 0657 11/07/21 0746 11/07/21 0953 11/07/21 1218   BP:  (!) 94/55     Pulse:  88     Resp: 18 16  21   Temp:  97.5 °F (36.4 °C)     TempSrc:  Infrared     SpO2: 95% 96%  96%   Weight:       Height:   5' 11\" (1.803 m)      Physical Exam   Constitutional/General: Alert   NAD THIN   Head: NC/AT  Eyes: PERRL, EOMI    Pulmonary: Lungs DEC  to auscultation bilaterally POST . Not in respiratory distress ON RA   Cardiovascular:  Regular rate and rhythm,    Abdomen: Soft, + BS. No distension. Nontender. Extremities: Moves all extremities x 4. Warm and well perfused  Skin: Warm and dry without rash  Neurologic:    No focal deficits  Psych: FLAT  Affect     DIAGNOSTIC RESULTS   RADIOLOGY:   XR CHEST PORTABLE    Result Date: 11/1/2021  EXAMINATION: ONE XRAY VIEW OF THE CHEST 11/1/2021 8:22 am COMPARISON: 09/14/2021 HISTORY: ORDERING SYSTEM PROVIDED HISTORY: cough TECHNOLOGIST PROVIDED HISTORY: Reason for exam:->cough FINDINGS: Cardiomediastinal silhouette is unremarkable. No airspace infiltrate, effusion, or pneumothorax is seen. No acute osseous abnormality is identified. No radiographic evidence of acute cardiopulmonary disease process     LABS  Recent Labs     11/06/21 0619   WBC 3.3*   HGB 12.2   HCT 37.8   .3*        Recent Labs     11/06/21 0619      K 4.4   CL 97*   CO2 33*   BUN 20   CREATININE 0.8   GFRAA >60   LABGLOM >60   GLUCOSE 74   PROT 6.3*   LABALBU 3.2*   CALCIUM 8.8   BILITOT 0.2   ALKPHOS 71   AST 38*   ALT 24     No results for input(s): PROCAL in the last 72 hours.   Lab Results   Component Value Date    CRP 2.4 (H) 11/03/2021    CRP 6.2 (H) 11/02/2021    CRP 5.2 (H) 11/02/2021     Lab Results   Component Value Date    SEDRATE 45 (H) 11/01/2021     No results found for: [id number]  Lab Results   Component Value Date    COVID19 DETECTED 11/01/2021     COVID-19/REBEKAH-COV2 LABS  Recent Labs     11/06/21 0619   AST 38*   ALT 24       MICROBIOLOGY:     Cultures :   Lab Results   Component Value Date    BC 24 Hours no growth 11/04/2021    BC 5 Days no growth 11/01/2021    BC 5 Days no growth 09/14/2021    ORG Staphylococcus coagulase-negative 11/01/2021     Lab Results   Component Value Date    BLOODCULT2 24 Hours no growth 11/04/2021 BLOODCULT2  11/01/2021     Gram stain performed from blood culture bottle media  Gram positive cocci in clusters      Chalmer Stacks  11/01/2021     This organism was isolated in one set. Susceptibility testing is not routinely done as this  organism frequently represents skin contamination. Additional testing can be ordered by calling the  Microbiology Department. ORG Staphylococcus coagulase-negative 11/01/2021       No results found for: WNDABS    Smear, Respiratory   Date Value Ref Range Status   11/04/2021   Final    Group 6: <25 PMN's/LPF and <25 Epithelial cells/LPF  Polymorphonuclear leukocytes not seen  Epithelial cells not seen  Rare Gram positive cocci       No results found for: MPNEUMO, CLAMYDCU, LABLEGI, AFBCX, FUNGSM, LABFUNG  CULTURE, RESPIRATORY   Date Value Ref Range Status   11/04/2021 Oral Pharyngeal Kathleen present  Final        Urine Culture, Routine   Date Value Ref Range Status   11/01/2021 <10,000 CFU/mL  Mixed gram positive organisms    Final        FINAL IMPRESSION    Patient is a 63 y.o. female who presented with   Chief Complaint   Patient presents with    Shortness of Breath     hx COPD and Asthma, states always SOB but worse today. Cough    and admitted for WILLIE (acute kidney injury) ([facility] [address] [N17.9]  Acute respiratory failure with hypoxia (HCC) [J96.01]  COVID [U07.1] PT WAS ON 6L WEANED TO RA HAS HYPOXEMIA   Fevers better  Gram positive cocci in clusters looks CONS contaminant   Urine GPO WITH TRICHOMONIASIS s/p flagyl  LEUKOPENIA      REPEAT BLOOD CX ngtd  CHECK URINE GC/CHLAMYDIA  pending   HIV/HEP C NR    vancomycin (VANCOCIN) 1,000 mg in dextrose 5 % 250 mL IVPB, Q24H   S/P FLAGYL  baricitinib (OLUMIANT) tablet 2 mg, Daily     Labs, cultures, imaging reviewed  From ID POV, can DC   Looking at SNF? Denna Moritz, CHICHO - NP  11/7/2021  1:53 PM     I have discussed the case, including pertinent history and physical  exam findings .  I have seen and examined the patient and the key elements of the encounter have been performed by me. I agree with the assessment, plan and orders as documented.       Treatment plan as per my recommendation     Ursula Mishra MD, FACP  11/7/2021  10:41 PM

## 2021-11-07 NOTE — PROGRESS NOTES
Department of Internal Medicine  General Internal Medicine  Attending Progress Note  Chief Complaint   Patient presents with    Shortness of Breath     hx COPD and Asthma, states always SOB but worse today.  Cough     SUBJECTIVE:    Reports that she is still having cough. Noted that headache has not improved. No nausea or vomiting. No diarrhea.      OBJECTIVE      Medications    Current Facility-Administered Medications: Benzocaine-Menthol (CEPACOL) 1 lozenge, 1 lozenge, Oral, Q2H PRN  baricitinib (OLUMIANT) tablet 4 mg, 4 mg, Oral, Daily  levoFLOXacin (LEVAQUIN) 500 MG/100ML infusion 500 mg, 500 mg, IntraVENous, Q24H  nicotine (NICODERM CQ) 21 MG/24HR 1 patch, 1 patch, TransDERmal, Daily  benztropine (COGENTIN) tablet 1 mg, 1 mg, Oral, BID  busPIRone (BUSPAR) tablet 10 mg, 10 mg, Oral, BID  traZODone (DESYREL) tablet 50 mg, 50 mg, Oral, Nightly PRN  sodium chloride flush 0.9 % injection 5-40 mL, 5-40 mL, IntraVENous, 2 times per day  sodium chloride flush 0.9 % injection 5-40 mL, 5-40 mL, IntraVENous, PRN  0.9 % sodium chloride infusion, 25 mL, IntraVENous, PRN  enoxaparin (LOVENOX) injection 30 mg, 30 mg, SubCUTAneous, BID  ondansetron (ZOFRAN-ODT) disintegrating tablet 4 mg, 4 mg, Oral, Q8H PRN **OR** ondansetron (ZOFRAN) injection 4 mg, 4 mg, IntraVENous, Q6H PRN  polyethylene glycol (GLYCOLAX) packet 17 g, 17 g, Oral, Daily PRN  acetaminophen (TYLENOL) tablet 650 mg, 650 mg, Oral, Q6H PRN **OR** acetaminophen (TYLENOL) suppository 650 mg, 650 mg, Rectal, Q6H PRN  dexamethasone (DECADRON) injection 6 mg, 6 mg, IntraVENous, Q24H  Vitamin D (CHOLECALCIFEROL) tablet 2,000 Units, 2,000 Units, Oral, Daily  guaiFENesin-dextromethorphan (ROBITUSSIN DM) 100-10 MG/5ML syrup 5 mL, 5 mL, Oral, Q4H PRN  albuterol (PROVENTIL) nebulizer solution 2.5 mg, 2.5 mg, Nebulization, Q6H PRN  lurasidone (LATUDA) tablet 40 mg, 40 mg, Oral, Daily  clonazePAM (KLONOPIN) tablet 1 mg, 1 mg, Oral, BID PRN  gabapentin (NEURONTIN) capsule 600 mg, 600 mg, Oral, BID  FLUoxetine (PROZAC) capsule 60 mg, 60 mg, Oral, Daily  albuterol-ipratropium (COMBIVENT RESPIMAT)  MCG/ACT inhaler 1 puff, 1 puff, Inhalation, Q6H  fluticasone (FLOVENT HFA) 110 MCG/ACT inhaler 1 puff, 1 puff, Inhalation, BID  Physical    VITALS:  BP (!) 94/55   Pulse 88   Temp 97.5 °F (36.4 °C) (Infrared)   Resp 21   Ht 5' 11\" (1.803 m)   Wt 145 lb (65.8 kg)   LMP  (LMP Unknown)   SpO2 96%   BMI 20.22 kg/m²   CONSTITUTIONAL:  awake, alert, cooperative, no apparent distress, and appears stated age  EYES:  extra-ocular muscles intact and vision intact  ENT:  normocepalic, without obvious abnormality  NECK:  supple, symmetrical, trachea midline  LUNGS:  No increased work of breathing, good air exchange, clear to auscultation bilaterally, no crackles or wheezing  CARDIOVASCULAR:  Normal apical impulse, regular rate and rhythm, normal S1 and S2, no S3 or S4, and no murmur noted  ABDOMEN:  No scars, normal bowel sounds, soft, non-distended, non-tender, no masses palpated, no hepatosplenomegally  MUSCULOSKELETAL:  there is no redness, warmth, or swelling of the joints  NEUROLOGIC:  Mental Status Exam:  Level of Alertness:   awake  Orientation:   person, place, time  SKIN:  no bruising or bleeding  Data    CBC:   Lab Results   Component Value Date    WBC 3.3 11/06/2021    RBC 3.77 11/06/2021    HGB 12.2 11/06/2021    HCT 37.8 11/06/2021    .3 11/06/2021    MCH 32.4 11/06/2021    MCHC 32.3 11/06/2021    RDW 13.7 11/06/2021     11/06/2021    MPV 11.6 11/06/2021     BMP:    Lab Results   Component Value Date     11/06/2021    K 4.4 11/06/2021    K 4.0 11/02/2021    CL 97 11/06/2021    CO2 33 11/06/2021    BUN 20 11/06/2021    LABALBU 3.2 11/06/2021    CREATININE 0.8 11/06/2021    CALCIUM 8.8 11/06/2021    GFRAA >60 11/06/2021    LABGLOM >60 11/06/2021    GLUCOSE 74 11/06/2021       ASSESSMENT AND PLAN    Brief summary of stay:  Patient was admitted with respiratory distress. She was noted to be covid positive. She was also in COPD Exacerbation. She was treated with decadrone and improved, but her headache continued to persist.   Blood culture was positive for staph and this was suspected to be contaminant. ID following, currently on flagyl and levaquin for trichomatis and ? Pna.   CT of the head and neck is negative for for fracture or any explanation of the non resolving headache. 1.  Acute respiratory failure with hypoxia ([facility] [address]  2. Bacteremia: suspected to be contaminant  3. Headaches ( suspected to be due to covid). 4.  COPD: no longer in exacerbation. 5.  Malnurition: continue to montor  6. Schizophrenia/depression:    Plans  Get chest X ray: for chest pain. Pain is reproducible.   Continue breathing treatment  Continue antibiotics  Plans is to discharge to SNF when approved

## 2021-11-07 NOTE — PROGRESS NOTES
Physical Therapy    Physical Therapy Treatment Note/Plan of Care    Room #:  2055/6013-24  Patient Name: Janee Weir  YOB: 1960  MRN: [id number]    Date of Service: 11/7/2021     Tentative placement recommendation: Subacute vs Home Health Physical Therapy if patient meets goals  Equipment recommendation: To be determined      Evaluating Physical Therapist: Josette Loyola  #[id number]      Specific Provider Orders/Date/Referring Provider :  11/01/21 1815   PT evaluation and treat Start: 11/01/21 1815, End: 11/01/21 1815, ONE TIME, Standing Count: 1 Occurrences, Jayda Gibson MD      Admitting Diagnosis:   WILLIE (acute kidney injury) ([facility] [address] [N17.9]  Acute respiratory failure with hypoxia ([facility] [address] [J96.01]  COVID [U07.1]       Surgery: none  Visit Diagnoses       Codes    COVID     U07.1          Patient Active Problem List   Diagnosis    Chronic pain of right knee    Osteoarthritis of spine with radiculopathy, cervical region    Cervical disc disorder    Cervical radiculopathy    Chronic pain syndrome    Acute pain of right knee    Acute respiratory failure with hypoxia and hypercapnia (HCC)    CAP (community acquired pneumonia)    COPD exacerbation ([address]    Depression    Acute exacerbation of chronic obstructive airways disease ([address]    Respiratory distress    WILLIE (acute kidney injury) ([address]    Schizophrenia ([facility] [address]    Normal anion gap metabolic acidosis    Acute respiratory failure with hypoxia (HCC)        ASSESSMENT of Current Deficits Patient exhibits decreased strength, balance, endurance and coordination impairing functional mobility, transfers, gait , gait distance and tolerance to activity are barriers to d/c and require skilled intervention during hospital stay to attain pre hospital level of function. Shortness of breath with short gait distance, pursed lip breathing given. Forward flexed with standing cues to maintain upright position.  Patient unsteady at times that can increase risk for falls. PHYSICAL THERAPY  PLAN OF CARE       Physical therapy plan of care is established based on physician order,  patient diagnosis and clinical assessment    Current Treatment Recommendations:    -Standing Balance: Perform strengthening exercises in standing to promote motor control with or without upper extremity support   -Transfers: Provide instruction on proper hand and foot position for adequate transfer of weight onto lower extremities and use of gait device if needed and Cues for hand placement, technique and safety. Provide stabilization to prevent fall   -Gait: Gait training and Standing activities to improve: base of support, weight shift, weight bearing    -Endurance: Utilize Supervised activities to increase level of endurance to allow for safe functional mobility including transfers and gait   -Stairs: Stair training with instruction on proper technique and hand placement on rail    PT long term treatment goals are located in below grid    Patient and or family understand(s) diagnosis, prognosis, and plan of care. Frequency of treatments: Patient will be seen  daily. Prior Level of Function: Patient ambulated independently    Rehab Potential: good    for baseline    Past medical history:   Past Medical History:   Diagnosis Date    Alcoholism ([facility] [address]     H/O    COPD (chronic obstructive pulmonary disease) ([facility] [address]     Dental caries     Hypertension     no meds    Lung nodules      [place].     Schizophrenia ([facility] [address]     stable    Ventricular hypokinesis     H/O     Past Surgical History:   Procedure Laterality Date    APPENDECTOMY       SECTION      2 C SECTION    ECHO COMPLETE  1/10/2014         AL DENTAL SURGERY PROCEDURE N/A 2018    MULTIPLE DENTAL EXTRACTIONS AND ALVELOPLASTY performed by Tonya Ho DDS at [address] [facility] Right     WRIST SURGERY Right        SUBJECTIVE:    Precautions: Up as tolerated, falls, alarm and Droplet plus/COVID-19 ,    Social history: Patient lives with son in a apartment    with 3 steps  to enter      Tub shower grab bars    Equipment owned: None,       [address] [address]   How much difficulty turning over in bed?: None  How much difficulty sitting down on / standing up from a chair with arms?: A Little  How much difficulty moving from lying on back to sitting on side of bed?: A Little  How much help from another person moving to and from a bed to a chair?: A Little  How much help from another person needed to walk in hospital room?: A Little  How much help from another person for climbing 3-5 steps with a railing?: A Lot  AM-PAC Inpatient Mobility Raw Score : 18  AM-PAC Inpatient T-Scale Score : 43.63  Mobility Inpatient CMS 0-100% Score: 46.58  Mobility Inpatient CMS G-Code Modifier : CK    Nursing cleared patient for PT treatment. .   OBJECTIVE;   Initial Evaluation  Date: 11/3/2021 Treatment Date:  11/7/2021       Short Term/ Long Term   Goals   Was pt agreeable to Eval/treatment? Yes yes To be met in 4 days   Pain level   0/10    No number given      Bed Mobility    Rolling: Independent    Supine to sit: Independent    Sit to supine: Independent    Scooting: Independent    Rolling: Independent   Supine to sit:  Independent   Sit to supine: Independent   Scooting: Independent       Transfers Sit to stand: Minimal assist of 1 x 3 reps  Sit to stand: Minimal assist of 1 Cues for hand placement and safety        Sit to stand: Independent     Ambulation    40 feet using  IV pole with Minimal assist of 1   for balance, upright, multiplane instability, safety and dizziness, Patient with shuffling steps and flexed posture and cues for upright posture and safety 3 steps from bed to commode and 15 feet using  wheeled walker with Minimal assist of 1   cues for upright posture, walker approximation, safety, pacing and pursed lip breathing       100 feet using intact, nursing notified. Patient would benefit from continued skilled Physical Therapy to improve functional independence and quality of life.          Patient's/ family goals   home    Time in 12  Time out  917    Total Treatment Time  23 minutes        CPT codes:    Therapeutic activities (47366)   15 minutes  1 unit(s)  Therapeutic exercises (40949)   8 minutes  1 unit(s)    [facility]  WRW#[id number]

## 2021-11-08 LAB
ALBUMIN SERPL-MCNC: 3.5 G/DL (ref 3.5–5.2)
ALP BLD-CCNC: 71 U/L (ref 35–104)
ALT SERPL-CCNC: 39 U/L (ref 0–32)
ANION GAP SERPL CALCULATED.3IONS-SCNC: 7 MMOL/L (ref 7–16)
AST SERPL-CCNC: 44 U/L (ref 0–31)
BASOPHILS ABSOLUTE: 0.01 E9/L (ref 0–0.2)
BASOPHILS RELATIVE PERCENT: 0.3 % (ref 0–2)
BILIRUB SERPL-MCNC: 0.2 MG/DL (ref 0–1.2)
BUN BLDV-MCNC: 26 MG/DL (ref 6–20)
C-REACTIVE PROTEIN: 0.3 MG/DL (ref 0–0.4)
C. TRACHOMATIS DNA ,URINE: NEGATIVE
CALCIUM SERPL-MCNC: 9.2 MG/DL (ref 8.6–10.2)
CHLORIDE BLD-SCNC: 99 MMOL/L (ref 98–107)
CO2: 31 MMOL/L (ref 22–29)
CREAT SERPL-MCNC: 1 MG/DL (ref 0.5–1)
CULTURE, BLOOD 2: ABNORMAL
D DIMER: <200 NG/ML DDU
EOSINOPHILS ABSOLUTE: 0 E9/L (ref 0.05–0.5)
EOSINOPHILS RELATIVE PERCENT: 0 % (ref 0–6)
FERRITIN: 138 NG/ML
GFR AFRICAN AMERICAN: >60
GFR NON-AFRICAN AMERICAN: 57 ML/MIN/1.73
GLUCOSE BLD-MCNC: 66 MG/DL (ref 74–99)
HCT VFR BLD CALC: 37 % (ref 34–48)
HEMOGLOBIN: 11.9 G/DL (ref 11.5–15.5)
IMMATURE GRANULOCYTES #: 0.02 E9/L
IMMATURE GRANULOCYTES %: 0.6 % (ref 0–5)
LYMPHOCYTES ABSOLUTE: 1.08 E9/L (ref 1.5–4)
LYMPHOCYTES RELATIVE PERCENT: 32.1 % (ref 20–42)
MCH RBC QN AUTO: 32.8 PG (ref 26–35)
MCHC RBC AUTO-ENTMCNC: 32.2 % (ref 32–34.5)
MCV RBC AUTO: 101.9 FL (ref 80–99.9)
MONOCYTES ABSOLUTE: 0.41 E9/L (ref 0.1–0.95)
MONOCYTES RELATIVE PERCENT: 12.2 % (ref 2–12)
N. GONORRHOEAE DNA, URINE: NEGATIVE
NEUTROPHILS ABSOLUTE: 1.84 E9/L (ref 1.8–7.3)
NEUTROPHILS RELATIVE PERCENT: 54.8 % (ref 43–80)
ORGANISM: ABNORMAL
PDW BLD-RTO: 13.6 FL (ref 11.5–15)
PLATELET # BLD: 281 E9/L (ref 130–450)
PMV BLD AUTO: 11.3 FL (ref 7–12)
POTASSIUM SERPL-SCNC: 4.8 MMOL/L (ref 3.5–5)
RBC # BLD: 3.63 E12/L (ref 3.5–5.5)
SODIUM BLD-SCNC: 137 MMOL/L (ref 132–146)
SOURCE: NORMAL
TOTAL PROTEIN: 6.2 G/DL (ref 6.4–8.3)
WBC # BLD: 3.4 E9/L (ref 4.5–11.5)

## 2021-11-08 PROCEDURE — 6360000002 HC RX W HCPCS: Performed by: INTERNAL MEDICINE

## 2021-11-08 PROCEDURE — 99232 SBSQ HOSP IP/OBS MODERATE 35: CPT | Performed by: INTERNAL MEDICINE

## 2021-11-08 PROCEDURE — 36415 COLL VENOUS BLD VENIPUNCTURE: CPT

## 2021-11-08 PROCEDURE — 97530 THERAPEUTIC ACTIVITIES: CPT

## 2021-11-08 PROCEDURE — 6370000000 HC RX 637 (ALT 250 FOR IP): Performed by: INTERNAL MEDICINE

## 2021-11-08 PROCEDURE — 2580000003 HC RX 258: Performed by: INTERNAL MEDICINE

## 2021-11-08 PROCEDURE — 6370000000 HC RX 637 (ALT 250 FOR IP): Performed by: HOSPITALIST

## 2021-11-08 PROCEDURE — 2060000000 HC ICU INTERMEDIATE R&B

## 2021-11-08 PROCEDURE — 85378 FIBRIN DEGRADE SEMIQUANT: CPT

## 2021-11-08 PROCEDURE — 86140 C-REACTIVE PROTEIN: CPT

## 2021-11-08 PROCEDURE — 80053 COMPREHEN METABOLIC PANEL: CPT

## 2021-11-08 PROCEDURE — 82728 ASSAY OF FERRITIN: CPT

## 2021-11-08 PROCEDURE — 85025 COMPLETE CBC W/AUTO DIFF WBC: CPT

## 2021-11-08 PROCEDURE — 94640 AIRWAY INHALATION TREATMENT: CPT

## 2021-11-08 RX ADMIN — LURASIDONE HYDROCHLORIDE 40 MG: 40 TABLET, FILM COATED ORAL at 08:37

## 2021-11-08 RX ADMIN — DEXAMETHASONE SODIUM PHOSPHATE 6 MG: 10 INJECTION INTRAMUSCULAR; INTRAVENOUS at 08:39

## 2021-11-08 RX ADMIN — BENZTROPINE MESYLATE 1 MG: 1 TABLET ORAL at 08:37

## 2021-11-08 RX ADMIN — Medication 10 ML: at 21:07

## 2021-11-08 RX ADMIN — GABAPENTIN 600 MG: 300 CAPSULE ORAL at 21:06

## 2021-11-08 RX ADMIN — BENZTROPINE MESYLATE 1 MG: 1 TABLET ORAL at 21:34

## 2021-11-08 RX ADMIN — GABAPENTIN 600 MG: 300 CAPSULE ORAL at 08:37

## 2021-11-08 RX ADMIN — IPRATROPIUM BROMIDE AND ALBUTEROL 1 PUFF: 20; 100 SPRAY, METERED RESPIRATORY (INHALATION) at 19:08

## 2021-11-08 RX ADMIN — FLUTICASONE PROPIONATE 1 PUFF: 110 AEROSOL, METERED RESPIRATORY (INHALATION) at 07:03

## 2021-11-08 RX ADMIN — BUSPIRONE HYDROCHLORIDE 10 MG: 10 TABLET ORAL at 08:37

## 2021-11-08 RX ADMIN — ENOXAPARIN SODIUM 30 MG: 100 INJECTION SUBCUTANEOUS at 21:06

## 2021-11-08 RX ADMIN — IPRATROPIUM BROMIDE AND ALBUTEROL 1 PUFF: 20; 100 SPRAY, METERED RESPIRATORY (INHALATION) at 07:03

## 2021-11-08 RX ADMIN — BUSPIRONE HYDROCHLORIDE 10 MG: 10 TABLET ORAL at 21:06

## 2021-11-08 RX ADMIN — FLUOXETINE 60 MG: 20 CAPSULE ORAL at 08:37

## 2021-11-08 RX ADMIN — IPRATROPIUM BROMIDE AND ALBUTEROL 1 PUFF: 20; 100 SPRAY, METERED RESPIRATORY (INHALATION) at 14:00

## 2021-11-08 RX ADMIN — TRAZODONE HYDROCHLORIDE 50 MG: 50 TABLET ORAL at 21:06

## 2021-11-08 RX ADMIN — LEVOFLOXACIN 500 MG: 5 INJECTION, SOLUTION INTRAVENOUS at 17:28

## 2021-11-08 RX ADMIN — IPRATROPIUM BROMIDE AND ALBUTEROL 1 PUFF: 20; 100 SPRAY, METERED RESPIRATORY (INHALATION) at 23:48

## 2021-11-08 RX ADMIN — BARICITINIB 4 MG: 2 TABLET, FILM COATED ORAL at 08:36

## 2021-11-08 RX ADMIN — Medication 10 ML: at 08:33

## 2021-11-08 RX ADMIN — ENOXAPARIN SODIUM 30 MG: 100 INJECTION SUBCUTANEOUS at 08:33

## 2021-11-08 RX ADMIN — Medication 2000 UNITS: at 08:37

## 2021-11-08 RX ADMIN — FLUTICASONE PROPIONATE 1 PUFF: 110 AEROSOL, METERED RESPIRATORY (INHALATION) at 19:08

## 2021-11-08 ASSESSMENT — PAIN SCALES - GENERAL: PAINLEVEL_OUTOF10: 4

## 2021-11-08 ASSESSMENT — ENCOUNTER SYMPTOMS
COUGH: 1
SHORTNESS OF BREATH: 1

## 2021-11-08 NOTE — CARE COORDINATION
SOCIAL WORK / DISCHARGE PLANNING:  COVID positive 11/01. Pt has been accepted at [facility] and [address] [place] [address], [facility] fax # [phone]. PRECERT to be started. Sw spoke with pt via phone to confirm she remains in agreement with dc plan. Sw did ask if would like this Sw to discuss with any family member and pt declined. She states \"if they cared, they would be up here beside me \". Sw counseled pt on no visitation due to COVID. She states then they could call me.  Facility is questioning if pt could be on alternative to [place] due to cost, will discuss with                      Electronically signed by BUCK Otero on 11/8/2021 at 2:42 PM

## 2021-11-08 NOTE — PROGRESS NOTES
Department of Internal Medicine  General Internal Medicine  Attending Progress Note  Chief Complaint   Patient presents with    Shortness of Breath     hx COPD and Asthma, states always SOB but worse today.  Cough     SUBJECTIVE:     No  headache. No nausea or vomiting. No diarrhea.      OBJECTIVE      Medications    Current Facility-Administered Medications: [START ON 11/9/2021] baricitinib (OLUMIANT) tablet 2 mg, 2 mg, Oral, Daily  Benzocaine-Menthol (CEPACOL) 1 lozenge, 1 lozenge, Oral, Q2H PRN  levoFLOXacin (LEVAQUIN) 500 MG/100ML infusion 500 mg, 500 mg, IntraVENous, Q24H  nicotine (NICODERM CQ) 21 MG/24HR 1 patch, 1 patch, TransDERmal, Daily  benztropine (COGENTIN) tablet 1 mg, 1 mg, Oral, BID  busPIRone (BUSPAR) tablet 10 mg, 10 mg, Oral, BID  traZODone (DESYREL) tablet 50 mg, 50 mg, Oral, Nightly PRN  sodium chloride flush 0.9 % injection 5-40 mL, 5-40 mL, IntraVENous, 2 times per day  sodium chloride flush 0.9 % injection 5-40 mL, 5-40 mL, IntraVENous, PRN  0.9 % sodium chloride infusion, 25 mL, IntraVENous, PRN  enoxaparin (LOVENOX) injection 30 mg, 30 mg, SubCUTAneous, BID  ondansetron (ZOFRAN-ODT) disintegrating tablet 4 mg, 4 mg, Oral, Q8H PRN **OR** ondansetron (ZOFRAN) injection 4 mg, 4 mg, IntraVENous, Q6H PRN  polyethylene glycol (GLYCOLAX) packet 17 g, 17 g, Oral, Daily PRN  acetaminophen (TYLENOL) tablet 650 mg, 650 mg, Oral, Q6H PRN **OR** acetaminophen (TYLENOL) suppository 650 mg, 650 mg, Rectal, Q6H PRN  dexamethasone (DECADRON) injection 6 mg, 6 mg, IntraVENous, Q24H  Vitamin D (CHOLECALCIFEROL) tablet 2,000 Units, 2,000 Units, Oral, Daily  guaiFENesin-dextromethorphan (ROBITUSSIN DM) 100-10 MG/5ML syrup 5 mL, 5 mL, Oral, Q4H PRN  albuterol (PROVENTIL) nebulizer solution 2.5 mg, 2.5 mg, Nebulization, Q6H PRN  lurasidone (LATUDA) tablet 40 mg, 40 mg, Oral, Daily  clonazePAM (KLONOPIN) tablet 1 mg, 1 mg, Oral, BID PRN  gabapentin (NEURONTIN) capsule 600 mg, 600 mg, Oral, BID  FLUoxetine (PROZAC) capsule 60 mg, 60 mg, Oral, Daily  albuterol-ipratropium (COMBIVENT RESPIMAT)  MCG/ACT inhaler 1 puff, 1 puff, Inhalation, Q6H  fluticasone (FLOVENT HFA) 110 MCG/ACT inhaler 1 puff, 1 puff, Inhalation, BID  Physical    VITALS:  /60   Pulse 62   Temp 96.5 °F (35.8 °C) (Oral)   Resp 18   Ht 5' 11\" (1.803 m)   Wt 145 lb (65.8 kg)   LMP  (LMP Unknown)   SpO2 94%   BMI 20.22 kg/m²   CONSTITUTIONAL:  awake, alert, cooperative, no apparent distress, and appears stated age  EYES:  extra-ocular muscles intact and vision intact  ENT:  normocepalic, without obvious abnormality  NECK:  supple, symmetrical, trachea midline  LUNGS:  No increased work of breathing, good air exchange, clear to auscultation bilaterally, no crackles or wheezing  CARDIOVASCULAR:  Normal apical impulse, regular rate and rhythm, normal S1 and S2, no S3 or S4, and no murmur noted  ABDOMEN:  No scars, normal bowel sounds, soft, non-distended, non-tender, no masses palpated, no hepatosplenomegally  MUSCULOSKELETAL:  there is no redness, warmth, or swelling of the joints  NEUROLOGIC:  Mental Status Exam:  Level of Alertness:   awake  Orientation:   person, place, time  SKIN:  no bruising or bleeding  Data    CBC:   Lab Results   Component Value Date    WBC 3.4 11/08/2021    RBC 3.63 11/08/2021    HGB 11.9 11/08/2021    HCT 37.0 11/08/2021    .9 11/08/2021    MCH 32.8 11/08/2021    MCHC 32.2 11/08/2021    RDW 13.6 11/08/2021     11/08/2021    MPV 11.3 11/08/2021     BMP:    Lab Results   Component Value Date     11/08/2021    K 4.8 11/08/2021    K 4.0 11/02/2021    CL 99 11/08/2021    CO2 31 11/08/2021    BUN 26 11/08/2021    LABALBU 3.5 11/08/2021    CREATININE 1.0 11/08/2021    CALCIUM 9.2 11/08/2021    GFRAA >60 11/08/2021    LABGLOM 57 11/08/2021    GLUCOSE 66 11/08/2021       ASSESSMENT AND PLAN    Brief summary of stay:  Patient was admitted with respiratory distress. She was noted to be covid positive. She was also in COPD Exacerbation. She was treated with decadrone and improved, but her headache continued to persist.   Blood culture was positive for staph and this was suspected to be contaminant. ID following, currently on flagyl and levaquin for trichomatis and ? Pna.   CT of the head and neck is negative for for fracture or any explanation of the non resolving headache. 1.  Acute respiratory failure with hypoxia: Continue breathing treatment & antibiotics  2. Bacteremia: suspected to be contaminant  3. Headaches ( suspected to be due to covid). 4.  COPD: no longer in exacerbation. 5.  Malnurition: continue to montor  6. Schizophrenia/depression:  7 chest pain reproducible on 11/7: CxR: similar chronic changes  8. Full code   9. dvt prophy lovenox  10.  Disposition: ecf when approved

## 2021-11-08 NOTE — PROGRESS NOTES
Physical Therapy    Physical Therapy Treatment Note/Plan of Care    Room #:  9213/8831-23  Patient Name: Enrike Krishna  YOB: 1960  MRN: [id number]    Date of Service: 11/8/2021     Tentative placement recommendation: Subacute vs Home Health Physical Therapy if patient meets goals  Equipment recommendation: To be determined      Evaluating Physical Therapist: Josette Sanchez  #[id number]      Specific Provider Orders/Date/Referring Provider :  11/01/21 1815   PT evaluation and treat Start: 11/01/21 1815, End: 11/01/21 1815, ONE TIME, Standing Count: 1 Occurrences, Tiara Tucker MD      Admitting Diagnosis:   WILLIE (acute kidney injury) ([facility] [N17.9]  Acute respiratory failure with hypoxia ([address] [J96.01]  COVID [U07.1]       Surgery: none  Visit Diagnoses       Codes    COVID     U07.1          Patient Active Problem List   Diagnosis    Chronic pain of right knee    Osteoarthritis of spine with radiculopathy, cervical region    Cervical disc disorder    Cervical radiculopathy    Chronic pain syndrome    Acute pain of right knee    Acute respiratory failure with hypoxia and hypercapnia (HCC)    CAP (community acquired pneumonia)    COPD exacerbation ([address]    Depression    Acute exacerbation of chronic obstructive airways disease ([address]    Respiratory distress    WILLIE (acute kidney injury) ([address]    Schizophrenia ([address]    Normal anion gap metabolic acidosis    Acute respiratory failure with hypoxia (HCC)        ASSESSMENT of Current Deficits Patient exhibits decreased strength, balance, endurance and coordination impairing functional mobility, transfers, gait , gait distance and tolerance to activity are barriers to d/c and require skilled intervention during hospital stay to attain pre hospital level of function. Shortness of breath with short gait distance, pursed lip breathing given. Forward flexed with standing cues to maintain upright position.  Patient's distance limited by her c/o pain with both legs, shortness of breath, and impaired endurance. PHYSICAL THERAPY  PLAN OF CARE       Physical therapy plan of care is established based on physician order,  patient diagnosis and clinical assessment    Current Treatment Recommendations:    -Standing Balance: Perform strengthening exercises in standing to promote motor control with or without upper extremity support   -Transfers: Provide instruction on proper hand and foot position for adequate transfer of weight onto lower extremities and use of gait device if needed and Cues for hand placement, technique and safety. Provide stabilization to prevent fall   -Gait: Gait training and Standing activities to improve: base of support, weight shift, weight bearing    -Endurance: Utilize Supervised activities to increase level of endurance to allow for safe functional mobility including transfers and gait   -Stairs: Stair training with instruction on proper technique and hand placement on rail    PT long term treatment goals are located in below grid    Patient and or family understand(s) diagnosis, prognosis, and plan of care. Frequency of treatments: Patient will be seen  daily. Prior Level of Function: Patient ambulated independently    Rehab Potential: good    for baseline    Past medical history:   Past Medical History:   Diagnosis Date    Alcoholism ([facility] [address]     H/O    COPD (chronic obstructive pulmonary disease) ([facility] [address] [facility]     Dental caries     Hypertension     no meds    Lung nodules      [place]     Schizophrenia ([facility]     stable    Ventricular hypokinesis     H/O     Past Surgical History:   Procedure Laterality Date    APPENDECTOMY       SECTION      2 C SECTION    ECHO COMPLETE  1/10/2014         FL DENTAL SURGERY PROCEDURE N/A 2018    MULTIPLE DENTAL EXTRACTIONS AND ALVELOPLASTY performed by Miles Singh DDS at [address] [place] [address] Right     WRIST SURGERY Right SUBJECTIVE:    Precautions: Up as tolerated, falls, alarm and Droplet plus/COVID-19 ,    Social history: Patient lives with son in a apartment    with 3 steps  to enter      Tub shower grab bars    Equipment owned: None,       [address]   How much difficulty turning over in bed?: None  How much difficulty sitting down on / standing up from a chair with arms?: A Little  How much difficulty moving from lying on back to sitting on side of bed?: None  How much help from another person moving to and from a bed to a chair?: A Little  How much help from another person needed to walk in hospital room?: A Little  How much help from another person for climbing 3-5 steps with a railing?: A Lot  AM-PAC Inpatient Mobility Raw Score : 19  AM-PAC Inpatient T-Scale Score : 45.44  Mobility Inpatient CMS 0-100% Score: 41.77  Mobility Inpatient CMS G-Code Modifier : CK    Nursing cleared patient for PT treatment. .   OBJECTIVE;   Initial Evaluation  Date: 11/3/2021 Treatment Date:  11/8/2021       Short Term/ Long Term   Goals   Was pt agreeable to Eval/treatment? Yes yes To be met in 4 days   Pain level   0/10    7/10   Pain with both legs      Bed Mobility    Rolling: Independent    Supine to sit: Independent    Sit to supine: Independent    Scooting: Independent    Rolling: Independent   Supine to sit:  Independent   Sit to supine: Independent   Scooting: Independent       Transfers Sit to stand: Minimal assist of 1 x 3 reps  Sit to stand: Minimal assist of 1 Cues for hand placement and safety        Sit to stand: Independent     Ambulation    40 feet using  IV pole with Minimal assist of 1   for balance, upright, multiplane instability, safety and dizziness, Patient with shuffling steps and flexed posture and cues for upright posture and safety 3 feet bed<>BSC; 2 x 20 feet using  wheeled walker with Minimal assist of 1   cues for upright posture, walker approximation, safety, pacing and pursed lip breathing       100 feet using  wheeled walker with Supervision     Stair negotiation: ascended and descended   Not assessed  Not assessed     3 steps with rail supervision    ROM Within functional limits        Strength BUE:  refer to OT eval  RLE:  4/5  LLE:  4/5  Increase strength in affected mm groups by 1/3 grade   Balance Sitting EOB:  fair  impulsive  Dynamic Standing:  poor requires bilateral hand support, dizzy, hypotensive Sitting EOB: good   Dynamic Standing: fair wheeled walker    Sitting EOB:  good    Dynamic Standing: good with wheeled walker      Patient is Alert & Oriented x person, place, time and situation and follows directions  Anxious, impulsive  Sensation:  Patient  denies numbness/tingling   Edema:  no   Endurance: poor  Tolerance to upright with dizziness and elevated hr    Vitals: room air   Blood Pressure at rest  Blood Pressure during session    Heart Rate at rest  Heart Rate during session    SPO2 at rest 94% SPO2 during session 94-95%     Patient education  Patient educated on role of Physical Therapy, risks of immobility, safety and plan of care, importance of positional changes for oxygen exchange,  importance of mobility while in hospital  and safety      Patient response to education:   Pt verbalized understanding Pt demonstrated skill Pt requires further education in this area   Yes Partial Yes      Treatment:  Patient practiced and was instructed/facilitated in the following treatment: Patient assisted to edge of bed, assisted on/off BSC. Pt  Sat edge of bed 15 minutes with Supervision  to increase dynamic sitting balance and activity tolerance. Educated and performed pursed lip breathing Pt stood, ambulated in room and back to the bed. Pt returned to supine. Therapeutic Exercises:  not performed    At end of session, patient in bed with alarm call light and phone within reach,  all lines and tubes intact, nursing notified.       Patient would benefit from continued skilled Physical Therapy to improve functional independence and quality of life. Patient's/ family goals   home    Time in 9:07  Time out  9:30    Total Treatment Time  23 minutes        CPT codes:    Therapeutic activities (99069)   23 minutes  2 unit(s)    Kristine Edmondson  [facility]  LIC # [id number]

## 2021-11-08 NOTE — PLAN OF CARE
Problem: Airway Clearance - Ineffective  Goal: Achieve or maintain patent airway  11/8/2021 1853 by Amanda Gonzalez RN  Outcome: Met This Shift     Problem: Gas Exchange - Impaired  Goal: Absence of hypoxia  Outcome: Met This Shift     Problem: Breathing Pattern - Ineffective  Goal: Ability to achieve and maintain a regular respiratory rate  11/8/2021 1853 by Amanda Gonzalez RN  Outcome: Met This Shift     Problem:  Body Temperature -  Risk of, Imbalanced  Goal: Ability to maintain a body temperature within defined limits  Outcome: Met This Shift

## 2021-11-08 NOTE — PLAN OF CARE
Problem: Airway Clearance - Ineffective  Goal: Achieve or maintain patent airway  Outcome: Met This Shift     Problem: Breathing Pattern - Ineffective  Goal: Ability to achieve and maintain a regular respiratory rate  Outcome: Met This Shift     Problem:  Body Temperature -  Risk of, Imbalanced  Goal: Ability to maintain a body temperature within defined limits  Outcome: Met This Shift     Problem: Isolation Precautions - Risk of Spread of Infection  Goal: Prevent transmission of infection  Outcome: Met This Shift     Problem: Risk for Fluid Volume Deficit  Goal: Maintain normal heart rhythm  Outcome: Met This Shift     Problem: Loneliness or Risk for Loneliness  Goal: Demonstrate positive use of time alone when socialization is not possible  Outcome: Met This Shift

## 2021-11-09 LAB
ALBUMIN SERPL-MCNC: 3.8 G/DL (ref 3.5–5.2)
ALP BLD-CCNC: 93 U/L (ref 35–104)
ALT SERPL-CCNC: 56 U/L (ref 0–32)
ANION GAP SERPL CALCULATED.3IONS-SCNC: 15 MMOL/L (ref 7–16)
AST SERPL-CCNC: 58 U/L (ref 0–31)
BILIRUB SERPL-MCNC: 0.2 MG/DL (ref 0–1.2)
BLOOD CULTURE, ROUTINE: NORMAL
BUN BLDV-MCNC: 38 MG/DL (ref 6–20)
CALCIUM SERPL-MCNC: 9.4 MG/DL (ref 8.6–10.2)
CHLORIDE BLD-SCNC: 98 MMOL/L (ref 98–107)
CO2: 23 MMOL/L (ref 22–29)
CREAT SERPL-MCNC: 1 MG/DL (ref 0.5–1)
CULTURE, BLOOD 2: NORMAL
GFR AFRICAN AMERICAN: >60
GFR NON-AFRICAN AMERICAN: 57 ML/MIN/1.73
GLUCOSE BLD-MCNC: 104 MG/DL (ref 74–99)
POTASSIUM SERPL-SCNC: 5.2 MMOL/L (ref 3.5–5)
SODIUM BLD-SCNC: 136 MMOL/L (ref 132–146)
TOTAL PROTEIN: 7 G/DL (ref 6.4–8.3)

## 2021-11-09 PROCEDURE — 6370000000 HC RX 637 (ALT 250 FOR IP): Performed by: INTERNAL MEDICINE

## 2021-11-09 PROCEDURE — 97110 THERAPEUTIC EXERCISES: CPT

## 2021-11-09 PROCEDURE — 6370000000 HC RX 637 (ALT 250 FOR IP): Performed by: SPECIALIST

## 2021-11-09 PROCEDURE — 2580000003 HC RX 258: Performed by: INTERNAL MEDICINE

## 2021-11-09 PROCEDURE — 99232 SBSQ HOSP IP/OBS MODERATE 35: CPT | Performed by: INTERNAL MEDICINE

## 2021-11-09 PROCEDURE — 6360000002 HC RX W HCPCS: Performed by: INTERNAL MEDICINE

## 2021-11-09 PROCEDURE — 1200000000 HC SEMI PRIVATE

## 2021-11-09 PROCEDURE — 97530 THERAPEUTIC ACTIVITIES: CPT

## 2021-11-09 PROCEDURE — 97535 SELF CARE MNGMENT TRAINING: CPT

## 2021-11-09 PROCEDURE — 36415 COLL VENOUS BLD VENIPUNCTURE: CPT

## 2021-11-09 PROCEDURE — 6370000000 HC RX 637 (ALT 250 FOR IP): Performed by: HOSPITALIST

## 2021-11-09 PROCEDURE — 80053 COMPREHEN METABOLIC PANEL: CPT

## 2021-11-09 PROCEDURE — 94640 AIRWAY INHALATION TREATMENT: CPT

## 2021-11-09 RX ORDER — GUAIFENESIN/DEXTROMETHORPHAN 100-10MG/5
5 SYRUP ORAL EVERY 4 HOURS PRN
Qty: 120 ML | Refills: 0 | Status: CANCELLED | OUTPATIENT
Start: 2021-11-09 | End: 2021-11-19

## 2021-11-09 RX ORDER — FLUOXETINE HYDROCHLORIDE 20 MG/1
60 CAPSULE ORAL DAILY
Qty: 30 CAPSULE | Refills: 3 | Status: CANCELLED | OUTPATIENT
Start: 2021-11-10

## 2021-11-09 RX ORDER — GABAPENTIN 300 MG/1
600 CAPSULE ORAL 2 TIMES DAILY
Qty: 90 CAPSULE | Refills: 0 | Status: CANCELLED | OUTPATIENT
Start: 2021-11-09 | End: 2021-12-09

## 2021-11-09 RX ORDER — TRAZODONE HYDROCHLORIDE 50 MG/1
50 TABLET ORAL NIGHTLY PRN
Qty: 30 TABLET | Refills: 0 | Status: CANCELLED | OUTPATIENT
Start: 2021-11-09 | End: 2021-12-09

## 2021-11-09 RX ADMIN — BUSPIRONE HYDROCHLORIDE 10 MG: 10 TABLET ORAL at 08:28

## 2021-11-09 RX ADMIN — ONDANSETRON 4 MG: 4 TABLET, ORALLY DISINTEGRATING ORAL at 08:42

## 2021-11-09 RX ADMIN — BENZTROPINE MESYLATE 1 MG: 1 TABLET ORAL at 08:28

## 2021-11-09 RX ADMIN — IPRATROPIUM BROMIDE AND ALBUTEROL 1 PUFF: 20; 100 SPRAY, METERED RESPIRATORY (INHALATION) at 18:59

## 2021-11-09 RX ADMIN — ENOXAPARIN SODIUM 30 MG: 100 INJECTION SUBCUTANEOUS at 08:29

## 2021-11-09 RX ADMIN — GABAPENTIN 600 MG: 300 CAPSULE ORAL at 08:28

## 2021-11-09 RX ADMIN — BENZTROPINE MESYLATE 1 MG: 1 TABLET ORAL at 20:33

## 2021-11-09 RX ADMIN — IPRATROPIUM BROMIDE AND ALBUTEROL 1 PUFF: 20; 100 SPRAY, METERED RESPIRATORY (INHALATION) at 22:38

## 2021-11-09 RX ADMIN — IPRATROPIUM BROMIDE AND ALBUTEROL 1 PUFF: 20; 100 SPRAY, METERED RESPIRATORY (INHALATION) at 06:58

## 2021-11-09 RX ADMIN — ENOXAPARIN SODIUM 30 MG: 100 INJECTION SUBCUTANEOUS at 19:40

## 2021-11-09 RX ADMIN — LEVOFLOXACIN 500 MG: 5 INJECTION, SOLUTION INTRAVENOUS at 17:12

## 2021-11-09 RX ADMIN — BARICITINIB 2 MG: 2 TABLET, FILM COATED ORAL at 08:28

## 2021-11-09 RX ADMIN — LURASIDONE HYDROCHLORIDE 40 MG: 40 TABLET, FILM COATED ORAL at 08:29

## 2021-11-09 RX ADMIN — FLUTICASONE PROPIONATE 1 PUFF: 110 AEROSOL, METERED RESPIRATORY (INHALATION) at 06:59

## 2021-11-09 RX ADMIN — FLUTICASONE PROPIONATE 1 PUFF: 110 AEROSOL, METERED RESPIRATORY (INHALATION) at 18:59

## 2021-11-09 RX ADMIN — BUSPIRONE HYDROCHLORIDE 10 MG: 10 TABLET ORAL at 19:39

## 2021-11-09 RX ADMIN — Medication 10 ML: at 08:32

## 2021-11-09 RX ADMIN — NYSTATIN 500000 UNITS: 100000 SUSPENSION ORAL at 17:11

## 2021-11-09 RX ADMIN — NYSTATIN 500000 UNITS: 100000 SUSPENSION ORAL at 19:42

## 2021-11-09 RX ADMIN — GABAPENTIN 600 MG: 300 CAPSULE ORAL at 19:40

## 2021-11-09 RX ADMIN — Medication 10 ML: at 19:43

## 2021-11-09 RX ADMIN — DEXAMETHASONE SODIUM PHOSPHATE 6 MG: 10 INJECTION INTRAMUSCULAR; INTRAVENOUS at 08:29

## 2021-11-09 RX ADMIN — FLUOXETINE 60 MG: 20 CAPSULE ORAL at 08:28

## 2021-11-09 RX ADMIN — Medication 2000 UNITS: at 08:28

## 2021-11-09 ASSESSMENT — ENCOUNTER SYMPTOMS
COUGH: 1
SHORTNESS OF BREATH: 1

## 2021-11-09 ASSESSMENT — PAIN SCALES - GENERAL: PAINLEVEL_OUTOF10: 0

## 2021-11-09 NOTE — CARE COORDINATION
SOCIAL WORK / DISCHARGE PLANNING:  COVID positive 11/01. [address] remains pending for [facility] [phone] fax # [phone] skilled, started 11/08. Room air. CECILIA will need signed by physician. HENS form initiated, will need completed when dc order is obtained.                    Electronically signed by BUCK Siu on 11/9/2021 at 10:58 AM

## 2021-11-09 NOTE — PROGRESS NOTES
[facility]     [address] [place] [address] 80  [place], [address] [place] [address] Street  Phone [phone]   [phone]      Admit Date: 2021  8:52 AM  Pt Name: Tc Francis  MRN: [id number]  : 1960  Reason for Consult:    Chief Complaint   Patient presents with    Shortness of Breath     hx COPD and Asthma, states always SOB but worse today.  Cough     Requesting Physician:  Owen Martinez MD  PCP: CHICHO Flores - CNP  History Obtained From:  patient, chart   ID consulted for WILLIE (acute kidney injury) ([facility] [address] [N17.9]  Acute respiratory failure with hypoxia ([address] [J96.01]  COVID [U07.1]  on hospital day [address]       Chief Complaint   Patient presents with    Shortness of Breath     hx COPD and Asthma, states always SOB but worse today.  Cough     HISTORYOF PRESENT ILLNESS   Tc Francis is a 63 y.o. female who presents with   has a past medical history of Alcoholism ([address] COPD (chronic obstructive pulmonary disease) ([address] Dental caries, Hypertension, Lung nodules, Schizophrenia ([address] and Ventricular hypokinesis. Shortness of Breath    Cough  Associated symptoms include shortness of breath.      PT CAME IN WITH COUGH FEVERS, HA CHEST PAIN AND ABD PAIN   PT WA SON 6L  PT IS CURRENTLY ON ROOM AIR  SHE DENIES EXPOSURE  WBC 9.5 CR1.2 COVID +  ID WAS ASKED TO SEE FOR BACTEREMIA CONS  CURRENTYL AFEBRILE  PT HAS ORTHOPEDIC  HARDWARE   PT IS SEXUALLY ACTIVE  PT HAS NO APPETITE DOES NOT WANT TO EAT   LIVES ALONE    CURRENT VISIT  21   DEC APPETITE  STILL WITH SOME ABD PAIN  ON RA  Has no c/o f/c/n/v/d     REVIEW OF SYSTEMS     CONSTITUTIONAL:   No fever, chills, weight loss  ALLERGIES:    No urticaria, hay fever,    EYES:     No blurry vision, loss of vision, eye pain  ENT:      No hearing loss, sore throat  CARDIOVASCULAR:  No chest pain or palpitations  RESPIRATORY:   No cough, sob  ENDOCRINE:    No increase thirst, urination   HEME-LYMPH:   No easy bruising or bleeding  GI:     No nausea, vomiting or diarrhea  :     No urinary complaints  NEURO:    No seizures, stroke, HA  MUSCULOSKELETAL:    muscle aches or pain, no joint pain  SKIN:     No rash or itch  PSYCH:    No depression or anxiety    Medications Prior to Admission: clonazePAM (KLONOPIN) 2 MG tablet, Take 1 mg by mouth 2 times daily as needed for Anxiety. FLUoxetine (PROZAC) 40 MG capsule, Take 40 mg by mouth daily Take with Prozac 20 mg for a total dose of 60 mg daily. gabapentin (NEURONTIN) 600 MG tablet, Take 600 mg by mouth 2 times daily. traZODone (DESYREL) 50 MG tablet, Take  mg by mouth nightly as needed for Sleep   benztropine (COGENTIN) 1 MG tablet, Take 1 mg by mouth 2 times daily  lurasidone (LATUDA) 40 MG TABS tablet, Take 40 mg by mouth daily  FLUoxetine (PROZAC) 20 MG capsule, Take 20 mg by mouth daily Take with Prozac 40 mg for a total dose of 60 mg daily.   busPIRone (BUSPAR) 10 MG tablet, Take 10 mg by mouth 2 times daily   CURRENT MEDICATIONS     Current Facility-Administered Medications:     [START ON 11/9/2021] baricitinib (OLUMIANT) tablet 2 mg, 2 mg, Oral, Daily, Mile Keita MD    Benzocaine-Menthol (CEPACOL) 1 lozenge, 1 lozenge, Oral, Q2H PRN, Mile Keita MD, 1 lozenge at 11/07/21 2118    levoFLOXacin (LEVAQUIN) 500 MG/100ML infusion 500 mg, 500 mg, IntraVENous, Q24H, Mile Keita MD, Last Rate: 100 mL/hr at 11/08/21 1728, 500 mg at 11/08/21 1728    nicotine (NICODERM CQ) 21 MG/24HR 1 patch, 1 patch, TransDERmal, Daily, Dutch Gonsalves MD, 1 patch at 11/08/21 0838    benztropine (COGENTIN) tablet 1 mg, 1 mg, Oral, BID, Mile Keita MD, 1 mg at 11/08/21 2134    busPIRone (BUSPAR) tablet 10 mg, 10 mg, Oral, BID, Mile Keita MD, 10 mg at 11/08/21 2106    traZODone (DESYREL) tablet 50 mg, 50 mg, Oral, Nightly PRN, Mile Keita MD, 50 mg at 11/08/21 2106    sodium chloride flush 0.9 % injection 5-40 mL, 5-40 mL, IntraVENous, 2 times per day, Mckayla Gayle MD, 10 mL at 11/08/21 2107    sodium chloride flush 0.9 % injection 5-40 mL, 5-40 mL, IntraVENous, PRN, Mckayla Gayle MD    0.9 % sodium chloride infusion, 25 mL, IntraVENous, PRN, Mckayla Gayle MD    enoxaparin (LOVENOX) injection 30 mg, 30 mg, SubCUTAneous, BID, Mckayla Gayle MD, 30 mg at 11/08/21 2106    ondansetron (ZOFRAN-ODT) disintegrating tablet 4 mg, 4 mg, Oral, Q8H PRN, 4 mg at 11/07/21 2117 **OR** ondansetron (ZOFRAN) injection 4 mg, 4 mg, IntraVENous, Q6H PRN, Mckayla Gayle MD, 4 mg at 11/03/21 1302    polyethylene glycol (GLYCOLAX) packet 17 g, 17 g, Oral, Daily PRN, Mckayla Gayle MD    acetaminophen (TYLENOL) tablet 650 mg, 650 mg, Oral, Q6H PRN, 650 mg at 11/07/21 2117 **OR** acetaminophen (TYLENOL) suppository 650 mg, 650 mg, Rectal, Q6H PRN, Mckayla Gayle MD    dexamethasone (DECADRON) injection 6 mg, 6 mg, IntraVENous, Q24H, Mckayla Gayle MD, 6 mg at 11/08/21 0839    Vitamin D (CHOLECALCIFEROL) tablet 2,000 Units, 2,000 Units, Oral, Daily, Mckayla Gayle MD, 2,000 Units at 11/08/21 0837    guaiFENesin-dextromethorphan (ROBITUSSIN DM) 100-10 MG/5ML syrup 5 mL, 5 mL, Oral, Q4H PRN, Mckayla Gayle MD, 5 mL at 11/07/21 2117    albuterol (PROVENTIL) nebulizer solution 2.5 mg, 2.5 mg, Nebulization, Q6H PRN, Mckayla Gayle MD    lurasidone (LATUDA) tablet 40 mg, 40 mg, Oral, Daily, Mckayla Gayle MD, 40 mg at 11/08/21 0837    clonazePAM (KLONOPIN) tablet 1 mg, 1 mg, Oral, BID PRN, Amee Kee DO, 1 mg at 11/05/21 2057    gabapentin (NEURONTIN) capsule 600 mg, 600 mg, Oral, BID, Ke Kee DO, 600 mg at 11/08/21 2106    FLUoxetine (PROZAC) capsule 60 mg, 60 mg, Oral, Daily, Ke Kee DO, 60 mg at 11/08/21 0837    albuterol-ipratropium (COMBIVENT RESPIMAT)  MCG/ACT inhaler 1 puff, 1 puff, Inhalation, Q6H, Cristi Brewer, MD, 1 puff at 21    fluticasone (FLOVENT HFA) 110 MCG/ACT inhaler 1 puff, 1 puff, Inhalation, BID, Nidia Henderson MD, 1 puff at 21  ALLERGIES     Codeine, Dust mite extract, Aspirin, and Penicillins  There is no immunization history for the selected administration types on file for this patient. Internal Administration   First Dose      Second Dose           Last COVID Lab SARS-CoV-2, NAAT (no units)   Date Value   2021 DETECTED (A)            PAST MEDICAL HISTORY     Past Medical History:   Diagnosis Date    Alcoholism ([facility] [address]     H/O    COPD (chronic obstructive pulmonary disease) ([facility] [facility]     Dental caries     Hypertension     no meds    Lung nodules      [place].  Schizophrenia ([facility] [address]     stable    Ventricular hypokinesis     H/O     SURGICAL HISTORY       Past Surgical History:   Procedure Laterality Date    APPENDECTOMY       SECTION      2 C SECTION    ECHO COMPLETE  1/10/2014         GA DENTAL SURGERY PROCEDURE N/A 2018    MULTIPLE DENTAL EXTRACTIONS AND ALVELOPLASTY performed by Anna Whittaker DDS at [address] [place] [address] Right     WRIST SURGERY Right    ·      PHYSICAL EXAM          Vitals:    21 0703 21 0800 21 2107   BP:  109/60 105/67 (!) 105/1   Pulse:  62 82 95   Resp:  18 18 18   Temp:  96.5 °F (35.8 °C) 97.8 °F (36.6 °C) 98.7 °F (37.1 °C)   TempSrc:  Oral Infrared Infrared   SpO2: 96% 94% 98% 98%   Weight:       Height:         Physical Exam   Constitutional/General: Alert   NAD   Head: NC/AT  Eyes: ANICTERIC   Pulmonary: Lungs DEC  to auscultation bilaterally  Cardiovascular:  Regular rate and rhythm,    Abdomen: Soft, + BS. No distension. Nontender. Extremities: Moves all extremities x 4.    Warm and well perfused  Skin: Warm and dry   Neurologic:    No focal deficits  Psych: FLAT  Affect     DIAGNOSTIC RESULTS   RADIOLOGY:   XR CHEST PORTABLE    Result Date: 11/1/2021  EXAMINATION: ONE XRAY VIEW OF THE CHEST 11/1/2021 8:22 am COMPARISON: 09/14/2021 HISTORY: ORDERING SYSTEM PROVIDED HISTORY: cough TECHNOLOGIST PROVIDED HISTORY: Reason for exam:->cough FINDINGS: Cardiomediastinal silhouette is unremarkable. No airspace infiltrate, effusion, or pneumothorax is seen. No acute osseous abnormality is identified. No radiographic evidence of acute cardiopulmonary disease process     LABS  Recent Labs     11/06/21 0619 11/08/21 0614   WBC 3.3* 3.4*   HGB 12.2 11.9   HCT 37.8 37.0   .3* 101.9*    281     Recent Labs     11/06/21 0619 11/06/21 0619 11/08/21 0614     --  137   K 4.4   < > 4.8   CL 97*   < > 99   CO2 33*   < > 31*   BUN 20  --  26*   CREATININE 0.8  --  1.0   GFRAA >60  --  >60   LABGLOM >60  --  57   GLUCOSE 74  --  66*   PROT 6.3*  --  6.2*   LABALBU 3.2*  --  3.5   CALCIUM 8.8  --  9.2   BILITOT 0.2  --  0.2   ALKPHOS 71  --  71   AST 38*  --  44*   ALT 24  --  39*    < > = values in this interval not displayed. No results for input(s): PROCAL in the last 72 hours. Lab Results   Component Value Date    CRP 0.3 11/08/2021    CRP 2.4 (H) 11/03/2021    CRP 6.2 (H) 11/02/2021     Lab Results   Component Value Date    SEDRATE 45 (H) 11/01/2021     No results found for: [id number]  Lab Results   Component Value Date    COVID19 DETECTED 11/01/2021     COVID-19/REBEKAH-COV2 LABS  Recent Labs     11/06/21 0619 11/08/21 0614 11/08/21 0615   CRP  --  0.3  --    FERRITIN  --  138  --    DDIMER  --   --  <200   AST 38* 44*  --    ALT 24 39*  --        MICROBIOLOGY:     Cultures :   Lab Results   Component Value Date    BC 24 Hours no growth 11/04/2021    BC 5 Days no growth 11/01/2021    BC 5 Days no growth 09/14/2021    ORG Staphylococcus coagulase-negative 11/01/2021     Lab Results   Component Value Date    BLOODCULT2 24 Hours no growth 11/04/2021    BLOODCULT2  11/01/2021     This organism was isolated in one set.   Susceptibility testing is not routinely done as this  organism frequently represents skin contamination. Additional testing can be ordered by calling the  Microbiology Department. BLOODCULT2 5 Days no growth 09/14/2021    ORG Staphylococcus coagulase-negative 11/01/2021       Smear, Respiratory   Date Value Ref Range Status   11/04/2021   Final    Group 6: <25 PMN's/LPF and <25 Epithelial cells/LPF  Polymorphonuclear leukocytes not seen  Epithelial cells not seen  Rare Gram positive cocci       No results found for: MPNEUMO, CLAMYDCU, LABLEGI, AFBCX, FUNGSM, LABFUNG  CULTURE, RESPIRATORY   Date Value Ref Range Status   11/04/2021 Oral Pharyngeal Kathleen present  Final        Urine Culture, Routine   Date Value Ref Range Status   11/01/2021 <10,000 CFU/mL  Mixed gram positive organisms    Final        FINAL IMPRESSION    Patient is a 63 y.o. female who presented with   Chief Complaint   Patient presents with    Shortness of Breath     hx COPD and Asthma, states always SOB but worse today.  Cough    and admitted for WILLIE (acute kidney injury) ([facility] [address] [N17.9]  Acute respiratory failure with hypoxia (HCC) [J96.01]  COVID [U07.1] PT WAS ON 6L WEANED TO RA HAS HYPOXEMIA   Fevers better  Gram positive cocci in clusters looks LIKE CONS contaminant   Urine GPO WITH TRICHOMONIASIS s/p flagyl  LEUKOPENIA      REPEAT BLOOD CX ngtd  CHECK URINE GC/CHLAMYDIA  NEG  HIV/HEP C NR    [START ON 11/9/2021] baricitinib (OLUMIANT) tablet 2 mg, Daily  levoFLOXacin (LEVAQUIN) 500 MG/100ML infusion 500 mg, Q24H  enoxaparin (LOVENOX) injection 30 mg, BID      Imaging and labs were reviewed per medical records and any ID pertinent labs were addressed with the patient. An opportunity to ask questions was given to the patient/FAMILY and questions were answered. Thank you for involving me in the care of [facility]. Please do not hesitate to call for any questions or concerns.          Electronically signed by Nicole Lewis MD on 11/8/2021 at

## 2021-11-09 NOTE — PROGRESS NOTES
Physical Therapy    Physical Therapy Treatment Note/Plan of Care    Room #:  3041/1361-26  Patient Name: Lizbeth Ornelas  YOB: 1960  MRN: [id number]    Date of Service: 11/9/2021     Tentative placement recommendation: Subacute vs Home Health Physical Therapy if patient meets goals  Equipment recommendation: To be determined      Evaluating Physical Therapist: Josette Wolf  #[id number]      Specific Provider Orders/Date/Referring Provider :  11/01/21 1815   PT evaluation and treat Start: 11/01/21 1815, End: 11/01/21 1815, ONE TIME, Standing Count: 1 Occurrences, Dimas Weber MD      Admitting Diagnosis:   WILLIE (acute kidney injury) ([facility] [address] [N17.9]  Acute respiratory failure with hypoxia ([address] [J96.01]  COVID [U07.1]       Surgery: none  Visit Diagnoses       Codes    COVID     U07.1          Patient Active Problem List   Diagnosis    Chronic pain of right knee    Osteoarthritis of spine with radiculopathy, cervical region    Cervical disc disorder    Cervical radiculopathy    Chronic pain syndrome    Acute pain of right knee    Acute respiratory failure with hypoxia and hypercapnia (HCC)    CAP (community acquired pneumonia)    COPD exacerbation ([address]    Depression    Acute exacerbation of chronic obstructive airways disease ([address]    Respiratory distress    WILLIE (acute kidney injury) ([address]    Schizophrenia ([address]    Normal anion gap metabolic acidosis    Acute respiratory failure with hypoxia (HCC)        ASSESSMENT of Current Deficits Patient exhibits decreased strength, balance, endurance and coordination impairing functional mobility, transfers, gait , gait distance and tolerance to activity are barriers to d/c and require skilled intervention during hospital stay to attain pre hospital level of function. Patient feeling better than yesterday and willing to walk in the hallway. Pt c/o dizziness once sitting up, on the [facility], and with turning during ambulation.  Pt at risk for falls due to distance her c/o pain with both legs, shortness of breath, dizziness, and impaired endurance. PHYSICAL THERAPY  PLAN OF CARE       Physical therapy plan of care is established based on physician order,  patient diagnosis and clinical assessment    Current Treatment Recommendations:    -Standing Balance: Perform strengthening exercises in standing to promote motor control with or without upper extremity support   -Transfers: Provide instruction on proper hand and foot position for adequate transfer of weight onto lower extremities and use of gait device if needed and Cues for hand placement, technique and safety. Provide stabilization to prevent fall   -Gait: Gait training and Standing activities to improve: base of support, weight shift, weight bearing    -Endurance: Utilize Supervised activities to increase level of endurance to allow for safe functional mobility including transfers and gait   -Stairs: Stair training with instruction on proper technique and hand placement on rail    PT long term treatment goals are located in below grid    Patient and or family understand(s) diagnosis, prognosis, and plan of care. Frequency of treatments: Patient will be seen  daily. Prior Level of Function: Patient ambulated independently    Rehab Potential: good    for baseline    Past medical history:   Past Medical History:   Diagnosis Date    Alcoholism ([facility] [address]     H/O    COPD (chronic obstructive pulmonary disease) ([facility] [address]     Dental caries     Hypertension     no meds    Lung nodules      [place].     Schizophrenia ([facility] [address]     stable    Ventricular hypokinesis     H/O     Past Surgical History:   Procedure Laterality Date    APPENDECTOMY       SECTION      2 C SECTION    ECHO COMPLETE  1/10/2014         OR DENTAL SURGERY PROCEDURE N/A 2018    MULTIPLE DENTAL EXTRACTIONS AND ALVELOPLASTY performed by Dashawn Hall DDS at [address] [place] [address]  WRIST SURGERY Right        SUBJECTIVE:    Precautions: Up as tolerated, falls, alarm and Droplet plus/COVID-19 ,    Social history: Patient lives with son in a apartment    with 3 steps  to enter      Tub shower grab bars    Equipment owned: None,       [id number] [facility]  Mobility Inpatient   How much difficulty turning over in bed?: None  How much difficulty sitting down on / standing up from a chair with arms?: A Little  How much difficulty moving from lying on back to sitting on side of bed?: None  How much help from another person moving to and from a bed to a chair?: A Little  How much help from another person needed to walk in hospital room?: A Little  How much help from another person for climbing 3-5 steps with a railing?: A Lot  AM-PAC Inpatient Mobility Raw Score : 19  AM-PAC Inpatient T-Scale Score : 45.44  Mobility Inpatient CMS 0-100% Score: 41.77  Mobility Inpatient CMS G-Code Modifier : CK    Nursing cleared patient for PT treatment. .   OBJECTIVE;   Initial Evaluation  Date: 11/3/2021 Treatment Date:  11/9/2021       Short Term/ Long Term   Goals   Was pt agreeable to Eval/treatment? Yes yes To be met in 4 days   Pain level   0/10    7/10   Pain with both legs      Bed Mobility    Rolling: Independent    Supine to sit: Independent    Sit to supine: Independent    Scooting: Independent    Rolling: Independent   Supine to sit:  Independent   Sit to supine: Independent   Scooting: Independent       Transfers Sit to stand: Minimal assist of 1 x 3 reps  Sit to stand: Minimal assist of 1 Cues for hand placement and safety        Sit to stand: Independent     Ambulation    40 feet using  IV pole with Minimal assist of 1   for balance, upright, multiplane instability, safety and dizziness, Patient with shuffling steps and flexed posture and cues for upright posture and safety 3 feet to BSC; 2 x 50 feet using  wheeled walker with Minimal assist of 1   cues for upright posture, walker approximation, safety, pacing and pursed lip breathing       100 feet using  wheeled walker with Supervision     Stair negotiation: ascended and descended   Not assessed  Not assessed     3 steps with rail supervision    ROM Within functional limits        Strength BUE:  refer to OT eval  RLE:  4/5  LLE:  4/5  Increase strength in affected mm groups by 1/3 grade   Balance Sitting EOB:  fair  impulsive  Dynamic Standing:  poor requires bilateral hand support, dizzy, hypotensive Sitting EOB: good   Dynamic Standing: fair wheeled walker    Sitting EOB:  good    Dynamic Standing: good with wheeled walker      Patient is Alert & Oriented x person, place, time and situation and follows directions  Anxious, impulsive  Sensation:  Patient  denies numbness/tingling   Edema:  no   Endurance: poor  Tolerance to upright with dizziness and elevated hr    Vitals: room air   Blood Pressure at rest  Blood Pressure during session    Heart Rate at rest  Heart Rate during session    SPO2 at rest 95% SPO2 during session 92-95%     Patient education  Patient educated on role of Physical Therapy, risks of immobility, safety and plan of care, importance of positional changes for oxygen exchange,  importance of mobility while in hospital  and safety      Patient response to education:   Pt verbalized understanding Pt demonstrated skill Pt requires further education in this area   Yes Partial Yes      Treatment:  Patient practiced and was instructed/facilitated in the following treatment: Patient assisted to edge of bed, assisted on/off BSC. Pt  Sat edge of bed 5 minutes with Supervision  to increase dynamic sitting balance and activity tolerance. Educated and performed pursed lip breathing Pt stood, ambulated in the hallway, and back to the bed. Pt performed seated exercises. Therapeutic Exercises:  ankle pumps, hip abduction/adduction, long arc quad and seated marching, x 20 -25 reps.     At end of session, patient in bed with alarm call light and phone within reach,  all lines and tubes intact, nursing notified. Patient would benefit from continued skilled Physical Therapy to improve functional independence and quality of life. Patient's/ family goals   home    Time in 10:45  Time out  11:09    Total Treatment Time  24 minutes        CPT codes:    Therapeutic activities (35996)   13 minutes  1 unit(s)  Therapeutic exercises (62561)   11 minutes  1 unit(s)    Casper Edmondson  [facility]  LIC # [id number]

## 2021-11-09 NOTE — PROGRESS NOTES
Department of Internal Medicine  General Internal Medicine  Attending Progress Note  Chief Complaint   Patient presents with    Shortness of Breath     hx COPD and Asthma, states always SOB but worse today.  Cough     SUBJECTIVE:     No complaints.  Doing better    OBJECTIVE      Medications    Current Facility-Administered Medications: baricitinib (OLUMIANT) tablet 2 mg, 2 mg, Oral, Daily  Benzocaine-Menthol (CEPACOL) 1 lozenge, 1 lozenge, Oral, Q2H PRN  levoFLOXacin (LEVAQUIN) 500 MG/100ML infusion 500 mg, 500 mg, IntraVENous, Q24H  nicotine (NICODERM CQ) 21 MG/24HR 1 patch, 1 patch, TransDERmal, Daily  benztropine (COGENTIN) tablet 1 mg, 1 mg, Oral, BID  busPIRone (BUSPAR) tablet 10 mg, 10 mg, Oral, BID  traZODone (DESYREL) tablet 50 mg, 50 mg, Oral, Nightly PRN  sodium chloride flush 0.9 % injection 5-40 mL, 5-40 mL, IntraVENous, 2 times per day  sodium chloride flush 0.9 % injection 5-40 mL, 5-40 mL, IntraVENous, PRN  0.9 % sodium chloride infusion, 25 mL, IntraVENous, PRN  enoxaparin (LOVENOX) injection 30 mg, 30 mg, SubCUTAneous, BID  ondansetron (ZOFRAN-ODT) disintegrating tablet 4 mg, 4 mg, Oral, Q8H PRN **OR** ondansetron (ZOFRAN) injection 4 mg, 4 mg, IntraVENous, Q6H PRN  polyethylene glycol (GLYCOLAX) packet 17 g, 17 g, Oral, Daily PRN  acetaminophen (TYLENOL) tablet 650 mg, 650 mg, Oral, Q6H PRN **OR** acetaminophen (TYLENOL) suppository 650 mg, 650 mg, Rectal, Q6H PRN  dexamethasone (DECADRON) injection 6 mg, 6 mg, IntraVENous, Q24H  Vitamin D (CHOLECALCIFEROL) tablet 2,000 Units, 2,000 Units, Oral, Daily  guaiFENesin-dextromethorphan (ROBITUSSIN DM) 100-10 MG/5ML syrup 5 mL, 5 mL, Oral, Q4H PRN  albuterol (PROVENTIL) nebulizer solution 2.5 mg, 2.5 mg, Nebulization, Q6H PRN  lurasidone (LATUDA) tablet 40 mg, 40 mg, Oral, Daily  clonazePAM (KLONOPIN) tablet 1 mg, 1 mg, Oral, BID PRN  gabapentin (NEURONTIN) capsule 600 mg, 600 mg, Oral, BID  FLUoxetine (PROZAC) capsule 60 mg, 60 mg, Oral, Daily  albuterol-ipratropium (COMBIVENT RESPIMAT)  MCG/ACT inhaler 1 puff, 1 puff, Inhalation, Q6H  fluticasone (FLOVENT HFA) 110 MCG/ACT inhaler 1 puff, 1 puff, Inhalation, BID  Physical    VITALS:  /64   Pulse 67   Temp 97.4 °F (36.3 °C)   Resp 16   Ht 5' 11\" (1.803 m)   Wt 145 lb (65.8 kg)   LMP  (LMP Unknown)   SpO2 93%   BMI 20.22 kg/m²   CONSTITUTIONAL:  awake, alert, cooperative, no apparent distress, and appears stated age  EYES:  extra-ocular muscles intact and vision intact  ENT:  normocepalic, without obvious abnormality  NECK:  supple, symmetrical, trachea midline  LUNGS:  No increased work of breathing, good air exchange, clear to auscultation bilaterally, no crackles or wheezing  CARDIOVASCULAR:  Normal apical impulse, regular rate and rhythm, normal S1 and S2, no S3 or S4, and no murmur noted  ABDOMEN:  No scars, normal bowel sounds, soft, non-distended, non-tender, no masses palpated, no hepatosplenomegally  MUSCULOSKELETAL:  there is no redness, warmth, or swelling of the joints  NEUROLOGIC:  Mental Status Exam:  Level of Alertness:   awake  Orientation:   person, place, time  SKIN:  no bruising or bleeding  Data    CBC:   Lab Results   Component Value Date    WBC 3.4 11/08/2021    RBC 3.63 11/08/2021    HGB 11.9 11/08/2021    HCT 37.0 11/08/2021    .9 11/08/2021    MCH 32.8 11/08/2021    MCHC 32.2 11/08/2021    RDW 13.6 11/08/2021     11/08/2021    MPV 11.3 11/08/2021     BMP:    Lab Results   Component Value Date     11/08/2021    K 4.8 11/08/2021    K 4.0 11/02/2021    CL 99 11/08/2021    CO2 31 11/08/2021    BUN 26 11/08/2021    LABALBU 3.5 11/08/2021    CREATININE 1.0 11/08/2021    CALCIUM 9.2 11/08/2021    GFRAA >60 11/08/2021    LABGLOM 57 11/08/2021    GLUCOSE 66 11/08/2021       ASSESSMENT AND PLAN    Brief summary of stay:  Patient was admitted with respiratory distress. She was noted to be covid positive. She was also in COPD Exacerbation.  She was treated with decadrone and improved, but her headache continued to persist.   Blood culture was positive for staph and this was suspected to be contaminant. ID following, currently on flagyl and levaquin for trichomatis and ? Pna.   CT of the head and neck is negative for for fracture or any explanation of the non resolving headache. 1.  Acute respiratory failure with hypoxia: Continue breathing treatment & antibiotics  2. Bacteremia: suspected to be contaminant but id on board giving iv levaquin  3. Headaches ( suspected to be due to covid). 4.  COPD: no longer in exacerbation. 5.  Malnurition: continue to montor  6. Schizophrenia/depression:  7 chest pain reproducible on 11/7: CxR: similar chronic changes  8. Full code   9. dvt prophy lovenox  10.  Disposition: ecf when approved

## 2021-11-09 NOTE — DISCHARGE INSTR - COC
Continuity of Care Form    Patient Name: Telma Peguero   :  1960  MRN:  [id number]    Admit date:  2021  Discharge date:  2021    Code Status Order: Full Code   Advance Directives:      Admitting Physician:  Sam Gibson MD  PCP: CHICHO Reyes CNP    Discharging Nurse: [facility] Unit/Room#: 0354/2018-40  Discharging Unit Phone Number: [phone]    Emergency Contact:   Extended Emergency Contact Information  Primary Emergency Contact: [address], [place] [address] Phone: [phone]  Relation: Child   needed? No  Secondary Emergency Contact: bobby johns  Mobile Phone: [phone]  Relation: Other    Past Surgical History:  Past Surgical History:   Procedure Laterality Date    APPENDECTOMY       SECTION      2 C SECTION    ECHO COMPLETE  1/10/2014         SC DENTAL SURGERY PROCEDURE N/A 2018    MULTIPLE DENTAL EXTRACTIONS AND ALVELOPLASTY performed by Mary Yoder DDS at [facility] Right     WRIST SURGERY Right        Immunization History: There is no immunization history for the selected administration types on file for this patient. Active Problems:  Patient Active Problem List   Diagnosis Code    Chronic pain of right knee M25.561, G89.29    Osteoarthritis of spine with radiculopathy, cervical region M47.22    Cervical disc disorder M50.90    Cervical radiculopathy M54.12    Chronic pain syndrome G89.4    Acute pain of right knee M25.561    Acute respiratory failure with hypoxia and hypercapnia (HCC) J96.01, J96.02    CAP (community acquired pneumonia) J18.9    COPD exacerbation ([facility] [address] J44.1    Depression F32. A    Acute exacerbation of chronic obstructive airways disease (HCC) J44.1    Respiratory distress R06.03    WILLIE (acute kidney injury) ([facility] [address] N17.9    Schizophrenia (HCC) F20.9    Normal anion gap metabolic acidosis X64.7    Acute respiratory failure with hypoxia (HCC) J96.01 Isolation/Infection:   Isolation            Droplet Plus  Droplet Plus          Patient Infection Status       Infection Onset Added Last Indicated Last Indicated By Review Planned Expiration Resolved Resolved By    COVID-19 11/01/21 11/01/21 11/01/21 SARS-CoV-2 NAAT (Rapid) 11/08/21 11/15/21      Resolved    COVID-19 Rule Out 11/01/21 11/01/21 11/01/21 SARS-CoV-2 NAAT (Rapid) (Ordered)   11/01/21 Rule-Out Test Resulted    COVID-19 Rule Out 09/14/21 09/14/21 09/14/21 COVID-19, Rapid (Ordered)   09/14/21 Rule-Out Test Resulted    COVID-19 Rule Out 07/02/21 07/02/21 07/02/21 COVID-19, Rapid (Ordered)   07/02/21 Rule-Out Test Resulted            Nurse Assessment:  Last Vital Signs: /64   Pulse 67   Temp 97.4 °F (36.3 °C)   Resp 16   Ht 5' 11\" (1.803 m)   Wt 145 lb (65.8 kg)   LMP  (LMP Unknown)   SpO2 93%   BMI 20.22 kg/m²     Last documented pain score (0-10 scale): Pain Level: 0  Last Weight:   Wt Readings from Last 1 Encounters:   11/02/21 145 lb (65.8 kg)     Mental Status:  oriented and alert    IV Access:  - None    Nursing Mobility/ADLs:  Walking   Assisted  Transfer  Assisted  Bathing  Assisted  Dressing  Assisted  Toileting  Assisted  Feeding  Independent  Med Admin  Dependent  Med Delivery   whole    Wound Care Documentation and Therapy:  Incision Mouth (Active)   Number of days:         Elimination:  Continence: Bowel: No  Bladder: Yes  Urinary Catheter: None   Colostomy/Ileostomy/Ileal Conduit: No       Date of Last BM: 11/10/2021    Intake/Output Summary (Last 24 hours) at 11/9/2021 1112  Last data filed at 11/9/2021 0030  Gross per 24 hour   Intake 780 ml   Output 1100 ml   Net -320 ml     I/O last 3 completed shifts: In: 1260 [P.O.:1260]  Out: 1100 [Urine:1100]    Safety Concerns: At Risk for Falls    Impairments/Disabilities:      None    Nutrition Therapy:  Current Nutrition Therapy:   - Oral Diet:  General    Routes of Feeding: Oral  Liquids:  Thin Liquids  Daily Fluid Restriction: no  Last Modified Barium Swallow with Video (Video Swallowing Test): not done    Treatments at the Time of Hospital Discharge:   Respiratory Treatments: ***  Oxygen Therapy:  is not on home oxygen therapy. Ventilator:    - No ventilator support    Rehab Therapies: Physical Therapy and Occupational Therapy  Weight Bearing Status/Restrictions: No weight bearing restirctions  Other Medical Equipment (for information only, NOT a DME order):  walker and hospital bed  Other Treatments: ***    Patient's personal belongings (please select all that are sent with patient):  None    RN SIGNATURE:  {Esignature:[id number]}    CASE MANAGEMENT/SOCIAL WORK SECTION    Inpatient Status Date: 11/01/21    Readmission Risk Assessment Score:  Readmission Risk              Risk of Unplanned Readmission:  28           Discharging to Facility/ Agency   Name: [facility] [phone] fax # [phone]  Address:  Phone:  Fax:    Dialysis Facility (if applicable)   Name:  Address:  Dialysis Schedule:  Phone:  Fax:    / signature: Electronically signed by BUCK Raman on 11/9/21 at 11:13 AM EST    PHYSICIAN SECTION    Prognosis: Good    Condition at Discharge: Stable     Rehab Potential (if transferring to Rehab): Good    Recommended Labs or Other Treatments After Discharge: bmp in 1 week    Physician Certification: I certify the above information and transfer of Carmen He is necessary for the continuing treatment of the diagnosis listed and that he requires Acute Rehab or skilled facility for less 30 days.      Update Admission H&P: No change in H&P    PHYSICIAN SIGNATURE:  Electronically signed by Millie Duarte MD on 11/9/21 at 3:44 PM EST

## 2021-11-09 NOTE — PLAN OF CARE
Problem: Airway Clearance - Ineffective  Goal: Achieve or maintain patent airway  11/9/2021 0029 by Littie Dakins, RN  Outcome: Met This Shift     Problem: Gas Exchange - Impaired  Goal: Absence of hypoxia  11/9/2021 0029 by Littie Dakins, RN  Outcome: Met This Shift     Problem: Gas Exchange - Impaired  Goal: Promote optimal lung function  Outcome: Met This Shift     Problem: Breathing Pattern - Ineffective  Goal: Ability to achieve and maintain a regular respiratory rate  11/9/2021 0029 by Littie Dakins, RN  Outcome: Met This Shift

## 2021-11-09 NOTE — PROGRESS NOTES
HEME-LYMPH:   No easy bruising or bleeding  GI:     No nausea, vomiting or diarrhea  :     No urinary complaints  NEURO:    No seizures, stroke, HA  MUSCULOSKELETAL:    muscle aches or pain, no joint pain  SKIN:     No rash or itch  PSYCH:    No depression or anxiety    Medications Prior to Admission: clonazePAM (KLONOPIN) 2 MG tablet, Take 1 mg by mouth 2 times daily as needed for Anxiety. FLUoxetine (PROZAC) 40 MG capsule, Take 40 mg by mouth daily Take with Prozac 20 mg for a total dose of 60 mg daily. gabapentin (NEURONTIN) 600 MG tablet, Take 600 mg by mouth 2 times daily. traZODone (DESYREL) 50 MG tablet, Take  mg by mouth nightly as needed for Sleep   benztropine (COGENTIN) 1 MG tablet, Take 1 mg by mouth 2 times daily  lurasidone (LATUDA) 40 MG TABS tablet, Take 40 mg by mouth daily  FLUoxetine (PROZAC) 20 MG capsule, Take 20 mg by mouth daily Take with Prozac 40 mg for a total dose of 60 mg daily.   busPIRone (BUSPAR) 10 MG tablet, Take 10 mg by mouth 2 times daily   CURRENT MEDICATIONS     Current Facility-Administered Medications:     Benzocaine-Menthol (CEPACOL) 1 lozenge, 1 lozenge, Oral, Q2H PRN, Elizabeth Vo MD, 1 lozenge at 11/07/21 2118    levoFLOXacin (LEVAQUIN) 500 MG/100ML infusion 500 mg, 500 mg, IntraVENous, Q24H, Elizabeth Vo MD, Last Rate: 100 mL/hr at 11/08/21 1728, 500 mg at 11/08/21 1728    nicotine (NICODERM CQ) 21 MG/24HR 1 patch, 1 patch, TransDERmal, Daily, Navneet Vega MD, 1 patch at 11/09/21 0832    benztropine (COGENTIN) tablet 1 mg, 1 mg, Oral, BID, Elizabeth Vo MD, 1 mg at 11/09/21 0828    busPIRone (BUSPAR) tablet 10 mg, 10 mg, Oral, BID, Elizabeth Vo MD, 10 mg at 11/09/21 0828    traZODone (DESYREL) tablet 50 mg, 50 mg, Oral, Nightly PRN, Elizabeth Vo MD, 50 mg at 11/08/21 2106    sodium chloride flush 0.9 % injection 5-40 mL, 5-40 mL, IntraVENous, 2 times per day, Elizabeth Vo MD, 10 mL at 11/09/21 0832    sodium chloride flush 0.9 % injection 5-40 mL, 5-40 mL, IntraVENous, PRN, Tere Lund MD    0.9 % sodium chloride infusion, 25 mL, IntraVENous, PRN, Tere Lund MD    enoxaparin (LOVENOX) injection 30 mg, 30 mg, SubCUTAneous, BID, Tere Lund MD, 30 mg at 11/09/21 0829    ondansetron (ZOFRAN-ODT) disintegrating tablet 4 mg, 4 mg, Oral, Q8H PRN, 4 mg at 11/09/21 0842 **OR** ondansetron (ZOFRAN) injection 4 mg, 4 mg, IntraVENous, Q6H PRN, Tere Lund MD, 4 mg at 11/03/21 1302    polyethylene glycol (GLYCOLAX) packet 17 g, 17 g, Oral, Daily PRN, Tere Lund MD    acetaminophen (TYLENOL) tablet 650 mg, 650 mg, Oral, Q6H PRN, 650 mg at 11/07/21 2117 **OR** acetaminophen (TYLENOL) suppository 650 mg, 650 mg, Rectal, Q6H PRN, Tere Lund MD    dexamethasone (DECADRON) injection 6 mg, 6 mg, IntraVENous, Q24H, Tere Lund MD, 6 mg at 11/09/21 0829    Vitamin D (CHOLECALCIFEROL) tablet 2,000 Units, 2,000 Units, Oral, Daily, Tere Lund MD, 2,000 Units at 11/09/21 0828    guaiFENesin-dextromethorphan (ROBITUSSIN DM) 100-10 MG/5ML syrup 5 mL, 5 mL, Oral, Q4H PRN, Tere Lund MD, 5 mL at 11/07/21 2117    albuterol (PROVENTIL) nebulizer solution 2.5 mg, 2.5 mg, Nebulization, Q6H PRN, Tere Lund MD    lurasidone (LATUDA) tablet 40 mg, 40 mg, Oral, Daily, Tere Lund MD, 40 mg at 11/09/21 0829    clonazePAM (KLONOPIN) tablet 1 mg, 1 mg, Oral, BID PRN, Catheryn Barthel Raspovic, DO, 1 mg at 11/05/21 2057    gabapentin (NEURONTIN) capsule 600 mg, 600 mg, Oral, BID, Ke Kee DO, 600 mg at 11/09/21 0828    FLUoxetine (PROZAC) capsule 60 mg, 60 mg, Oral, Daily, Ke Kee DO, 60 mg at 11/09/21 0828    albuterol-ipratropium (COMBIVENT RESPIMAT)  MCG/ACT inhaler 1 puff, 1 puff, Inhalation, Q6H, Tere Lund MD, 1 puff at 11/09/21 0658    fluticasone (FLOVENT HFA) 110 MCG/ACT inhaler 1 puff, 1 puff, Inhalation, BID, Gabo Nicholson MD, 1 puff at 21 4066  ALLERGIES     Codeine, Dust mite extract, Aspirin, and Penicillins  There is no immunization history for the selected administration types on file for this patient. Internal Administration   First Dose      Second Dose           Last COVID Lab SARS-CoV-2, NAAT (no units)   Date Value   2021 DETECTED (A)            PAST MEDICAL HISTORY     Past Medical History:   Diagnosis Date    Alcoholism ([facility] [address]     H/O    COPD (chronic obstructive pulmonary disease) ([facility] [address]     Dental caries     Hypertension     no meds    Lung nodules      unaware.  Schizophrenia ([facility] [address]     stable    Ventricular hypokinesis     H/O     SURGICAL HISTORY       Past Surgical History:   Procedure Laterality Date    APPENDECTOMY       SECTION      2 C SECTION    ECHO COMPLETE  1/10/2014         ME DENTAL SURGERY PROCEDURE N/A 2018    MULTIPLE DENTAL EXTRACTIONS AND ALVELOPLASTY performed by Anastacia Araiza DDS at [address] [place] [address] Right     WRIST SURGERY Right    ·      PHYSICAL EXAM          Vitals:    21 2107 21 0601 21 0658 21 0800   BP: (!) 105/1 (!) 112/57  123/64   Pulse: 95 61  67   Resp: 18 16 18 16   Temp: 98.7 °F (37.1 °C) 98.6 °F (37 °C)  97.4 °F (36.3 °C)   TempSrc: Infrared Infrared     SpO2: 98% 96% 97% 93%   Weight:       Height:         Physical Exam   Constitutional/General: Alert   NAD thin   Head: NC/AT no thrush   Eyes: ANICTERIC   Pulmonary: Lungs DEC  to auscultation bilaterally post   Cardiovascular:  Regular rate and rhythm,    Abdomen: Soft, + BS. No distension. Nontender. Extremities: Moves all extremities x 4.    Warm and well perfused  Skin: Warm and dry   Neurologic:    No focal deficits  Psych: FLAT  Affect     DIAGNOSTIC RESULTS   RADIOLOGY:   XR CHEST PORTABLE    Result Date: 2021  EXAMINATION: ONE XRAY VIEW OF THE CHEST 2021 8:22 am COMPARISON: 2021 HISTORY: ORDERING SYSTEM PROVIDED HISTORY: cough TECHNOLOGIST PROVIDED HISTORY: Reason for exam:->cough FINDINGS: Cardiomediastinal silhouette is unremarkable. No airspace infiltrate, effusion, or pneumothorax is seen. No acute osseous abnormality is identified. No radiographic evidence of acute cardiopulmonary disease process     LABS  Recent Labs     11/08/21 0614   WBC 3.4*   HGB 11.9   HCT 37.0   .9*        Recent Labs     11/08/21 0614      K 4.8   CL 99   CO2 31*   BUN 26*   CREATININE 1.0   GFRAA >60   LABGLOM 57   GLUCOSE 66*   PROT 6.2*   LABALBU 3.5   CALCIUM 9.2   BILITOT 0.2   ALKPHOS 71   AST 44*   ALT 39*     No results for input(s): PROCAL in the last 72 hours. Lab Results   Component Value Date    CRP 0.3 11/08/2021    CRP 2.4 (H) 11/03/2021    CRP 6.2 (H) 11/02/2021     Lab Results   Component Value Date    SEDRATE 45 (H) 11/01/2021     No results found for: [id number]  Lab Results   Component Value Date    COVID19 DETECTED 11/01/2021     COVID-19/REBEKAH-COV2 LABS  Recent Labs     11/08/21 0614 11/08/21 0615   CRP 0.3  --    FERRITIN 138  --    DDIMER  --  <200   AST 44*  --    ALT 39*  --        MICROBIOLOGY:     Cultures :   Lab Results   Component Value Date    BC 5 Days no growth 11/04/2021    BC 5 Days no growth 11/01/2021    BC 5 Days no growth 09/14/2021    ORG Staphylococcus coagulase-negative 11/01/2021     Lab Results   Component Value Date    BLOODCULT2 5 Days no growth 11/04/2021    BLOODCULT2  11/01/2021     This organism was isolated in one set. Susceptibility testing is not routinely done as this  organism frequently represents skin contamination. Additional testing can be ordered by calling the  Microbiology Department.       BLOODCULT2 5 Days no growth 09/14/2021    ORG Staphylococcus coagulase-negative 11/01/2021       Smear, Respiratory   Date Value Ref Range Status   11/04/2021   Final    Group 6: <25 PMN's/LPF and <25 Epithelial cells/LPF  Polymorphonuclear leukocytes not seen  Epithelial cells not seen  Rare Gram positive cocci       No results found for: MPNEUMO, CLAMYDCU, LABLEGI, AFBCX, FUNGSM, LABFUNG  CULTURE, RESPIRATORY   Date Value Ref Range Status   11/04/2021 Oral Pharyngeal Kathleen present  Final        Urine Culture, Routine   Date Value Ref Range Status   11/01/2021 <10,000 CFU/mL  Mixed gram positive organisms    Final        FINAL IMPRESSION    Patient is a 63 y.o. female who presented with   Chief Complaint   Patient presents with    Shortness of Breath     hx COPD and Asthma, states always SOB but worse today.  Cough    and admitted for WILLIE (acute kidney injury) ([facility] [address] [N17.9]  Acute respiratory failure with hypoxia (HCC) [J96.01]  COVID [U07.1] PT WAS ON 6L WEANED TO RA HAS HYPOXEMIA   Fevers better  Gram positive cocci in clusters looks LIKE CONS contaminant   Urine GPO WITH TRICHOMONIASIS s/p flagyl  LEUKOPENIA      REPEAT BLOOD CX ngtd  CHECK URINE GC/CHLAMYDIA  NEG  HIV/HEP C NR    [START ON 11/9/2021] baricitinib (OLUMIANT) tablet 2 mg, Daily can stop   levoFLOXacin (LEVAQUIN) 500 MG/100ML infusion 500 mg, Q24H can stop   enoxaparin (LOVENOX) injection 30 mg, BID  Bladder scan       Imaging and labs were reviewed per medical records and any ID pertinent labs were addressed with the patient. An opportunity to ask questions was given to the patient/FAMILY and questions were answered. Thank you for involving me in the care of [facility]. Please do not hesitate to call for any questions or concerns.          Electronically signed by Amrita Herrera MD on 11/9/2021 at 4:33 PM
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
## 2021-11-09 NOTE — PROGRESS NOTES
Pharmacy Covid-19 Management Note    Patient has consistently tolerated room air without any documented oxygen desaturations over the past several days. Patient meets criteria for the early termination of therapy due to potential risks of continued therapy no longer outweighing benefits. Pharmacy will sign off on the consult. Please re-consult if patient clinical status changes.       Yamila Velasquez, PharmD 11/9/2021 4:02 PM   [phone]

## 2021-11-10 PROCEDURE — 1200000000 HC SEMI PRIVATE

## 2021-11-10 PROCEDURE — 6370000000 HC RX 637 (ALT 250 FOR IP): Performed by: INTERNAL MEDICINE

## 2021-11-10 PROCEDURE — 94640 AIRWAY INHALATION TREATMENT: CPT

## 2021-11-10 PROCEDURE — 6370000000 HC RX 637 (ALT 250 FOR IP): Performed by: SPECIALIST

## 2021-11-10 PROCEDURE — 6360000002 HC RX W HCPCS: Performed by: INTERNAL MEDICINE

## 2021-11-10 PROCEDURE — 99239 HOSP IP/OBS DSCHRG MGMT >30: CPT | Performed by: INTERNAL MEDICINE

## 2021-11-10 PROCEDURE — 6370000000 HC RX 637 (ALT 250 FOR IP): Performed by: HOSPITALIST

## 2021-11-10 PROCEDURE — 2580000003 HC RX 258: Performed by: INTERNAL MEDICINE

## 2021-11-10 PROCEDURE — 97116 GAIT TRAINING THERAPY: CPT

## 2021-11-10 RX ORDER — NICOTINE 21 MG/24HR
1 PATCH, TRANSDERMAL 24 HOURS TRANSDERMAL DAILY
Qty: 30 PATCH | Refills: 3 | Status: SHIPPED | OUTPATIENT
Start: 2021-11-11

## 2021-11-10 RX ORDER — CHOLECALCIFEROL (VITAMIN D3) 50 MCG
2000 TABLET ORAL DAILY
Qty: 60 TABLET | Refills: 0 | Status: SHIPPED | OUTPATIENT
Start: 2021-11-11 | End: 2021-12-11

## 2021-11-10 RX ORDER — GABAPENTIN 300 MG/1
600 CAPSULE ORAL 2 TIMES DAILY
Qty: 90 CAPSULE | Refills: 0 | Status: SHIPPED | OUTPATIENT
Start: 2021-11-10 | End: 2021-12-10

## 2021-11-10 RX ORDER — TRAZODONE HYDROCHLORIDE 50 MG/1
50 TABLET ORAL NIGHTLY PRN
Qty: 30 TABLET | Refills: 0 | Status: SHIPPED | OUTPATIENT
Start: 2021-11-10 | End: 2021-12-10

## 2021-11-10 RX ORDER — FLUOXETINE HYDROCHLORIDE 20 MG/1
60 CAPSULE ORAL DAILY
Qty: 30 CAPSULE | Refills: 3 | Status: SHIPPED | OUTPATIENT
Start: 2021-11-11

## 2021-11-10 RX ORDER — CLONAZEPAM 0.5 MG/1
1 TABLET ORAL 2 TIMES DAILY
Qty: 60 TABLET | Refills: 0 | Status: SHIPPED | OUTPATIENT
Start: 2021-11-10 | End: 2021-12-10

## 2021-11-10 RX ORDER — ALBUTEROL SULFATE 2.5 MG/3ML
2.5 SOLUTION RESPIRATORY (INHALATION) EVERY 6 HOURS PRN
Qty: 120 EACH | Refills: 3 | Status: SHIPPED | OUTPATIENT
Start: 2021-11-10

## 2021-11-10 RX ORDER — DEXAMETHASONE 6 MG/1
6 TABLET ORAL
Qty: 3 TABLET | Refills: 0 | Status: SHIPPED | OUTPATIENT
Start: 2021-11-10 | End: 2021-11-13

## 2021-11-10 RX ORDER — LEVOFLOXACIN 5 MG/ML
500 INJECTION, SOLUTION INTRAVENOUS EVERY 24 HOURS
Qty: 1000 ML | Refills: 0 | Status: SHIPPED | OUTPATIENT
Start: 2021-11-10 | End: 2021-11-10 | Stop reason: HOSPADM

## 2021-11-10 RX ORDER — GUAIFENESIN/DEXTROMETHORPHAN 100-10MG/5
5 SYRUP ORAL EVERY 4 HOURS PRN
Qty: 120 ML | Refills: 0 | Status: SHIPPED | OUTPATIENT
Start: 2021-11-10 | End: 2021-11-20

## 2021-11-10 RX ORDER — FLUTICASONE PROPIONATE 110 UG/1
1 AEROSOL, METERED RESPIRATORY (INHALATION) 2 TIMES DAILY
Qty: 12 G | Refills: 3 | Status: SHIPPED | OUTPATIENT
Start: 2021-11-10

## 2021-11-10 RX ADMIN — NYSTATIN 500000 UNITS: 100000 SUSPENSION ORAL at 14:31

## 2021-11-10 RX ADMIN — BUSPIRONE HYDROCHLORIDE 10 MG: 10 TABLET ORAL at 11:02

## 2021-11-10 RX ADMIN — FLUTICASONE PROPIONATE 1 PUFF: 110 AEROSOL, METERED RESPIRATORY (INHALATION) at 17:16

## 2021-11-10 RX ADMIN — BUSPIRONE HYDROCHLORIDE 10 MG: 10 TABLET ORAL at 20:51

## 2021-11-10 RX ADMIN — Medication 2000 UNITS: at 11:03

## 2021-11-10 RX ADMIN — BENZTROPINE MESYLATE 1 MG: 1 TABLET ORAL at 21:54

## 2021-11-10 RX ADMIN — ENOXAPARIN SODIUM 30 MG: 100 INJECTION SUBCUTANEOUS at 20:51

## 2021-11-10 RX ADMIN — Medication 10 ML: at 11:00

## 2021-11-10 RX ADMIN — FLUTICASONE PROPIONATE 1 PUFF: 110 AEROSOL, METERED RESPIRATORY (INHALATION) at 06:26

## 2021-11-10 RX ADMIN — FLUOXETINE 60 MG: 20 CAPSULE ORAL at 11:02

## 2021-11-10 RX ADMIN — DEXAMETHASONE SODIUM PHOSPHATE 6 MG: 10 INJECTION INTRAMUSCULAR; INTRAVENOUS at 10:54

## 2021-11-10 RX ADMIN — ENOXAPARIN SODIUM 30 MG: 100 INJECTION SUBCUTANEOUS at 10:59

## 2021-11-10 RX ADMIN — BENZTROPINE MESYLATE 1 MG: 1 TABLET ORAL at 12:54

## 2021-11-10 RX ADMIN — IPRATROPIUM BROMIDE AND ALBUTEROL 1 PUFF: 20; 100 SPRAY, METERED RESPIRATORY (INHALATION) at 10:53

## 2021-11-10 RX ADMIN — Medication 5 ML: at 20:54

## 2021-11-10 RX ADMIN — IPRATROPIUM BROMIDE AND ALBUTEROL 1 PUFF: 20; 100 SPRAY, METERED RESPIRATORY (INHALATION) at 07:08

## 2021-11-10 RX ADMIN — NYSTATIN 500000 UNITS: 100000 SUSPENSION ORAL at 19:03

## 2021-11-10 RX ADMIN — GABAPENTIN 600 MG: 300 CAPSULE ORAL at 20:51

## 2021-11-10 RX ADMIN — GABAPENTIN 600 MG: 300 CAPSULE ORAL at 11:02

## 2021-11-10 RX ADMIN — LURASIDONE HYDROCHLORIDE 40 MG: 40 TABLET, FILM COATED ORAL at 12:54

## 2021-11-10 RX ADMIN — IPRATROPIUM BROMIDE AND ALBUTEROL 1 PUFF: 20; 100 SPRAY, METERED RESPIRATORY (INHALATION) at 17:16

## 2021-11-10 RX ADMIN — IPRATROPIUM BROMIDE AND ALBUTEROL 1 PUFF: 20; 100 SPRAY, METERED RESPIRATORY (INHALATION) at 23:00

## 2021-11-10 RX ADMIN — NYSTATIN 500000 UNITS: 100000 SUSPENSION ORAL at 11:00

## 2021-11-10 RX ADMIN — ACETAMINOPHEN 650 MG: 325 TABLET ORAL at 22:31

## 2021-11-10 ASSESSMENT — PAIN SCALES - GENERAL
PAINLEVEL_OUTOF10: 9
PAINLEVEL_OUTOF10: 0
PAINLEVEL_OUTOF10: 9

## 2021-11-10 ASSESSMENT — ENCOUNTER SYMPTOMS
COUGH: 1
SHORTNESS OF BREATH: 1

## 2021-11-10 ASSESSMENT — PAIN DESCRIPTION - DESCRIPTORS: DESCRIPTORS: ACHING

## 2021-11-10 ASSESSMENT — PAIN DESCRIPTION - FREQUENCY: FREQUENCY: INTERMITTENT

## 2021-11-10 ASSESSMENT — PAIN DESCRIPTION - LOCATION: LOCATION: HEAD;ABDOMEN;CHEST

## 2021-11-10 NOTE — PROGRESS NOTES
[facility]     [address] [place] [address] 80  [place], [address]  Phone [phone]   [phone]      Admit Date: 2021  8:52 AM  Pt Name: Gillian Quispe  MRN: [id number]  : 1960  Reason for Consult:    Chief Complaint   Patient presents with    Shortness of Breath     hx COPD and Asthma, states always SOB but worse today.  Cough     Requesting Physician:  Shabana Hernandez MD  PCP: CHICHO Oakley - CNP  History Obtained From:  patient, chart   ID consulted for WILLIE (acute kidney injury) ([facility] [address] [N17.9]  Acute respiratory failure with hypoxia ([facility] [address] [J96.01]  COVID [U07.1]  on hospital day 9   Face to face encounter   [facility]       Chief Complaint   Patient presents with    Shortness of Breath     hx COPD and Asthma, states always SOB but worse today.  Cough     HISTORYOF PRESENT ILLNESS   Gillian Quispe is a 63 y.o. female who presents with   has a past medical history of Alcoholism ([facility] [address] COPD (chronic obstructive pulmonary disease) ([facility] [address] Dental caries, Hypertension, Lung nodules, Schizophrenia ([address] and Ventricular hypokinesis. Shortness of Breath    Cough  Associated symptoms include shortness of breath. PT CAME IN WITH COUGH FEVERS, HA CHEST PAIN AND ABD PAIN   PT WA SON 6L  PT IS CURRENTLY ON ROOM AIR  SHE DENIES EXPOSURE  WBC 9.5 CR1.2 COVID +  ID WAS ASKED TO SEE FOR BACTEREMIA CONS  CURRENTYL AFEBRILE  PT HAS ORTHOPEDIC  HARDWARE   PT IS SEXUALLY ACTIVE  PT HAS NO APPETITE DOES NOT WANT TO EAT   LIVES ALONE    CURRENT VISIT  11/10/21   Has nausea  Feeling ok   In bed.    ON RA  Has no c/o f/c/n/v/d     REVIEW OF SYSTEMS     CONSTITUTIONAL:   No fever, chills, weight loss  ALLERGIES:    No urticaria, hay fever,    EYES:     No blurry vision, loss of vision, eye pain  ENT:      No hearing loss, sore throat  CARDIOVASCULAR:   No chest pain or palpitations  RESPIRATORY:   No cough, sob  ENDOCRINE:    No increase thirst, urination   HEME-LYMPH:   No easy bruising or bleeding  GI:     No nausea, vomiting or diarrhea  :     No urinary complaints  NEURO:    No seizures, stroke, HA  MUSCULOSKELETAL:    muscle aches or pain, no joint pain  SKIN:     No rash or itch  PSYCH:    No depression or anxiety    Medications Prior to Admission: benztropine (COGENTIN) 1 MG tablet, Take 1 mg by mouth 2 times daily  lurasidone (LATUDA) 40 MG TABS tablet, Take 40 mg by mouth daily  busPIRone (BUSPAR) 10 MG tablet, Take 10 mg by mouth 2 times daily   [DISCONTINUED] clonazePAM (KLONOPIN) 2 MG tablet, Take 1 mg by mouth 2 times daily as needed for Anxiety. [DISCONTINUED] FLUoxetine (PROZAC) 40 MG capsule, Take 40 mg by mouth daily Take with Prozac 20 mg for a total dose of 60 mg daily. [DISCONTINUED] gabapentin (NEURONTIN) 600 MG tablet, Take 600 mg by mouth 2 times daily. [DISCONTINUED] traZODone (DESYREL) 50 MG tablet, Take  mg by mouth nightly as needed for Sleep   [DISCONTINUED] FLUoxetine (PROZAC) 20 MG capsule, Take 20 mg by mouth daily Take with Prozac 40 mg for a total dose of 60 mg daily.   CURRENT MEDICATIONS     Current Facility-Administered Medications:     nystatin (MYCOSTATIN) 687174 UNIT/ML suspension 500,000 Units, 5 mL, Oral, 4x Daily, Angela Scott MD, 500,000 Units at 11/10/21 1100    Benzocaine-Menthol (CEPACOL) 1 lozenge, 1 lozenge, Oral, Q2H PRN, Antionette Alarcon MD, 1 lozenge at 11/07/21 2118    levoFLOXacin (LEVAQUIN) 500 MG/100ML infusion 500 mg, 500 mg, IntraVENous, Q24H, Antionette Alarcon MD, Stopped at 11/09/21 1834    nicotine (NICODERM CQ) 21 MG/24HR 1 patch, 1 patch, TransDERmal, Daily, Medina Rowland MD, 1 patch at 11/10/21 1057    benztropine (COGENTIN) tablet 1 mg, 1 mg, Oral, BID, Antionette Alarcon MD, 1 mg at 11/09/21 2033    busPIRone (BUSPAR) tablet 10 mg, 10 mg, Oral, BID, Antionette Alarcon MD, 10 mg at 11/10/21 1102    traZODone (DESYREL) tablet 50 mg, 50 mg, Oral, Nightly PRN, David Palacio MD, 50 mg at 11/08/21 2106    sodium chloride flush 0.9 % injection 5-40 mL, 5-40 mL, IntraVENous, 2 times per day, David Palacio MD, 10 mL at 11/10/21 1100    sodium chloride flush 0.9 % injection 5-40 mL, 5-40 mL, IntraVENous, PRN, David Palacio MD    0.9 % sodium chloride infusion, 25 mL, IntraVENous, PRN, David Palacio MD    enoxaparin (LOVENOX) injection 30 mg, 30 mg, SubCUTAneous, BID, David Palacio MD, 30 mg at 11/10/21 1059    ondansetron (ZOFRAN-ODT) disintegrating tablet 4 mg, 4 mg, Oral, Q8H PRN, 4 mg at 11/09/21 0842 **OR** ondansetron (ZOFRAN) injection 4 mg, 4 mg, IntraVENous, Q6H PRN, David Palacio MD, 4 mg at 11/03/21 1302    polyethylene glycol (GLYCOLAX) packet 17 g, 17 g, Oral, Daily PRN, David Palacio MD    acetaminophen (TYLENOL) tablet 650 mg, 650 mg, Oral, Q6H PRN, 650 mg at 11/07/21 2117 **OR** acetaminophen (TYLENOL) suppository 650 mg, 650 mg, Rectal, Q6H PRN, David Palacio MD    dexamethasone (DECADRON) injection 6 mg, 6 mg, IntraVENous, Q24H, David Palacio MD, 6 mg at 11/10/21 1054    Vitamin D (CHOLECALCIFEROL) tablet 2,000 Units, 2,000 Units, Oral, Daily, David Palacio MD, 2,000 Units at 11/10/21 1103    guaiFENesin-dextromethorphan (ROBITUSSIN DM) 100-10 MG/5ML syrup 5 mL, 5 mL, Oral, Q4H PRN, David Palacio MD, 5 mL at 11/07/21 2117    albuterol (PROVENTIL) nebulizer solution 2.5 mg, 2.5 mg, Nebulization, Q6H PRN, David Palacio MD    lurasidone (LATUDA) tablet 40 mg, 40 mg, Oral, Daily, David Palacio MD, 40 mg at 11/09/21 0829    clonazePAM (KLONOPIN) tablet 1 mg, 1 mg, Oral, BID PRN, [employer], DO, 1 mg at 11/05/21 2057    gabapentin (NEURONTIN) capsule 600 mg, 600 mg, Oral, BID, Ke Kee DO, 600 mg at 11/10/21 1102    FLUoxetine (PROZAC) capsule 60 mg, 60 mg, Oral, Daily, Ke Kee DO, 60 mg at 11/10/21 1102    albuterol-ipratropium (COMBIVENT RESPIMAT)  MCG/ACT inhaler 1 puff, 1 puff, Inhalation, Q6H, Susan Lantigua MD, 1 puff at 11/10/21 1053    fluticasone (FLOVENT HFA) 110 MCG/ACT inhaler 1 puff, 1 puff, Inhalation, BID, Susan Lantigua MD, 1 puff at 11/10/21 7893  ALLERGIES     Codeine, Dust mite extract, Aspirin, and Penicillins  There is no immunization history for the selected administration types on file for this patient. Internal Administration   First Dose      Second Dose           Last COVID Lab SARS-CoV-2, NAAT (no units)   Date Value   2021 DETECTED (A)        PAST MEDICAL HISTORY     Past Medical History:   Diagnosis Date    Alcoholism ([facility] [address]     H/O    COPD (chronic obstructive pulmonary disease) ([facility] [address]     Dental caries     Hypertension     no meds    Lung nodules      unaware.  Schizophrenia ([facility] [address]     stable    Ventricular hypokinesis     H/O     SURGICAL HISTORY       Past Surgical History:   Procedure Laterality Date    APPENDECTOMY       SECTION      2 C SECTION    ECHO COMPLETE  1/10/2014         NC DENTAL SURGERY PROCEDURE N/A 2018    MULTIPLE DENTAL EXTRACTIONS AND ALVELOPLASTY performed by Anita Ambrocio DDS at [address] [place] [address] Right     WRIST SURGERY Right    ·   PHYSICAL EXAM       Vitals:    21 0658 21 0800 21 2000 11/10/21 1000   BP:  123/64 (!) 109/57 112/64   Pulse:  67 76 76   Resp: 18 16 18 18   Temp:  97.4 °F (36.3 °C) 98.7 °F (37.1 °C) 98.4 °F (36.9 °C)   TempSrc:   Oral Oral   SpO2: 97% 93% 94%    Weight:       Height:         Physical Exam   Constitutional/General: Alert   NAD thin - in bed   Head: NC/AT no thrush   Eyes: ANICTERIC   Pulmonary: Lungs DEC  to auscultation bilaterally post   Cardiovascular:  Regular rate and rhythm,    Abdomen: Soft, + BS. No distension. Nontender. Extremities: Moves all extremities x 4.    Warm and well perfused  Skin: Warm and dry   Neurologic:    No focal deficits  Psych: FLAT  Affect     DIAGNOSTIC RESULTS   RADIOLOGY:   XR CHEST PORTABLE    Result Date: 11/1/2021  EXAMINATION: ONE XRAY VIEW OF THE CHEST 11/1/2021 8:22 am COMPARISON: 09/14/2021 HISTORY: ORDERING SYSTEM PROVIDED HISTORY: cough TECHNOLOGIST PROVIDED HISTORY: Reason for exam:->cough FINDINGS: Cardiomediastinal silhouette is unremarkable. No airspace infiltrate, effusion, or pneumothorax is seen. No acute osseous abnormality is identified. No radiographic evidence of acute cardiopulmonary disease process     LABS  Recent Labs     11/08/21 0614   WBC 3.4*   HGB 11.9   HCT 37.0   .9*        Recent Labs     11/08/21 0614 11/08/21 0614 11/09/21  1606     --  136   K 4.8   < > 5.2*   CL 99   < > 98   CO2 31*   < > 23   BUN 26*  --  38*   CREATININE 1.0  --  1.0   GFRAA >60  --  >60   LABGLOM 57  --  57   GLUCOSE 66*  --  104*   PROT 6.2*  --  7.0   LABALBU 3.5  --  3.8   CALCIUM 9.2  --  9.4   BILITOT 0.2  --  0.2   ALKPHOS 71  --  93   AST 44*  --  58*   ALT 39*  --  56*    < > = values in this interval not displayed. No results for input(s): PROCAL in the last 72 hours.   Lab Results   Component Value Date    CRP 0.3 11/08/2021    CRP 2.4 (H) 11/03/2021    CRP 6.2 (H) 11/02/2021     Lab Results   Component Value Date    SEDRATE 45 (H) 11/01/2021     No results found for: [id number]  Lab Results   Component Value Date    COVID19 DETECTED 11/01/2021     COVID-19/REBEKAH-COV2 LABS  Recent Labs     11/08/21 0614 11/08/21 0615 11/09/21  1606   CRP 0.3  --   --    FERRITIN 138  --   --    DDIMER  --  <200  --    AST 44*  --  58*   ALT 39*  --  56*       MICROBIOLOGY:     Cultures :   Lab Results   Component Value Date    BC 5 Days no growth 11/04/2021    BC 5 Days no growth 11/01/2021    BC 5 Days no growth 09/14/2021    ORG Staphylococcus coagulase-negative 11/01/2021     Lab Results   Component Value Date    BLOODCULT2 5 Days no growth 11/04/2021    BLOODCULT2  11/01/2021     This organism was isolated in one set. Susceptibility testing is not routinely done as this  organism frequently represents skin contamination. Additional testing can be ordered by calling the  Microbiology Department. BLOODCULT2 5 Days no growth 09/14/2021    ORG Staphylococcus coagulase-negative 11/01/2021       Smear, Respiratory   Date Value Ref Range Status   11/04/2021   Final    Group 6: <25 PMN's/LPF and <25 Epithelial cells/LPF  Polymorphonuclear leukocytes not seen  Epithelial cells not seen  Rare Gram positive cocci       No results found for: MPNEUMO, CLAMYDCU, LABLEGI, AFBCX, FUNGSM, LABFUNG  CULTURE, RESPIRATORY   Date Value Ref Range Status   11/04/2021 Oral Pharyngeal Kathleen present  Final        Urine Culture, Routine   Date Value Ref Range Status   11/01/2021 <10,000 CFU/mL  Mixed gram positive organisms    Final        FINAL IMPRESSION    Patient is a 63 y.o. female who presented with   Chief Complaint   Patient presents with    Shortness of Breath     hx COPD and Asthma, states always SOB but worse today.  Cough    and admitted for WILLIE (acute kidney injury) ([facility] [address] [N17.9]  Acute respiratory failure with hypoxia ([facility] [address] [J96.01]  COVID [U07.1] PT WAS ON 6L WEANED TO RA HAS HYPOXEMIA   Fevers better  Gram positive cocci in clusters -CONS- Contaminant   Urine GPO WITH TRICHOMONIASIS s/p flagyl  LEUKOPENIA   transamintis   HIV negative / Hep C negative      Plan:   · Stop levaquin  · Stop baricitinib   · On decadron  · Repeat blood culture- NGTD   · On room air  · No new issues overnight   · For discharge later today to [facility] facility     Imaging and labs were reviewed per medical records and any ID pertinent labs were addressed with the patient. An opportunity to ask questions was given to the patient/FAMILY and questions were answered. Thank you for involving me in the care of [facility]. Please do not hesitate to call for any questions or concerns.      Electronically signed by CHICHO Charles on 11/10/2021 at 12:17 PM    The patient has COVID-19 infection. In order to prevent self exposure and cross contamination care will be coordinated with patient's care team. All relevant records and diagnostic tests were reviewed in EMR, including laboratory results and imaging. As above   I have performed and participated medical decision making, and  POC with the NURSE PRACTITIONER, CHICHO Charles. On RA  Leukopenia  trichomoniasis  covid weaned to RA  Tree-in-bud nodules in the right greater than left lower lungs follow up cxry or Ct chest          Can d/c  F/u prn      Imaging and labs were reviewed. Jorge Ulrich was informed of their diagnosis, indications, risks and benefits of treatment. Jorge Ulrich had the opportunity to ask questions. All questions were answered. Thank you for involving me in the care of Jorge Ulrich. Please do not hesitate to call for any questions or concerns.     Electronically signed by Mckenzie Main MD on 11/10/2021 at 5:28 PM    Phone [phone]  Fax [phone]

## 2021-11-10 NOTE — DISCHARGE SUMMARY
similar chronic changes. Chest pain continues but she does not always mention it since she is a poor historian. On 11/10 she confirms that it is not worse, it is worse with breathing and coughing. She notes that she will have to continue to cough and breathe. She is correct. She relates this sternal pain to her epigastric pain as well. 8. Full code   9. dvt prophy lovenox  10. Disposition: ecf when a    Discharge Exam:  Vitals:    11/09/21 0658 11/09/21 0800 11/09/21 2000 11/10/21 1000   BP:  123/64 (!) 109/57 112/64   Pulse:  67 76 76   Resp: 18 16 18 18   Temp:  97.4 °F (36.3 °C) 98.7 °F (37.1 °C) 98.4 °F (36.9 °C)   TempSrc:   Oral Oral   SpO2: 97% 93% 94%    Weight:       Height:           No acute distress moist membranes   Normocephalic, without obvious abnormality, atraumatic, external ears without lesions,   Neck supple no cervical lymphadenopathy  Heart has a regular rate and rhythm no murmur  Lungs are clear to auscultation bilaterally with equal movements. Abdomen is soft nontender nondistended no rebound or guarding. No significant peripheral edema good peripheral perfusion. No significant rashes or new skin lesions. No new focality on neuro exam which is overall grossly intact. Affect and mood seem to be appropriate     I/O last 3 completed shifts: In: 480 [P.O.:480]  Out: 200 [Urine:200]  No intake/output data recorded. LABS:  Recent Labs     11/08/21 0614 11/09/21  1606    136   K 4.8 5.2*   CL 99 98   CO2 31* 23   BUN 26* 38*   CREATININE 1.0 1.0   GLUCOSE 66* 104*   CALCIUM 9.2 9.4       Recent Labs     11/08/21 0614   WBC 3.4*   RBC 3.63   HGB 11.9   HCT 37.0   .9*   MCH 32.8   MCHC 32.2   RDW 13.6      MPV 11.3       No results for input(s): POCGLU in the last 72 hours.       Imaging:  XR CHEST PORTABLE    Result Date: 11/1/2021  EXAMINATION: ONE XRAY VIEW OF THE CHEST 11/1/2021 8:22 am COMPARISON: 09/14/2021 HISTORY: ORDERING SYSTEM PROVIDED HISTORY: cough TECHNOLOGIST PROVIDED HISTORY: Reason for exam:->cough FINDINGS: Cardiomediastinal silhouette is unremarkable. No airspace infiltrate, effusion, or pneumothorax is seen. No acute osseous abnormality is identified. No radiographic evidence of acute cardiopulmonary disease process         Patient Instructions:   Current Discharge Medication List      START taking these medications    Details   dexamethasone (DECADRON) 6 MG tablet Take 1 tablet by mouth daily (with breakfast) for 3 days  Qty: 3 tablet, Refills: 0      albuterol-ipratropium (COMBIVENT RESPIMAT)  MCG/ACT AERS inhaler Inhale 1 puff into the lungs every 6 hours  Qty: 1 each, Refills: 0      albuterol (PROVENTIL) (2.5 MG/3ML) 0.083% nebulizer solution Take 3 mLs by nebulization every 6 hours as needed for Wheezing  Qty: 120 each, Refills: 3      fluticasone (FLOVENT HFA) 110 MCG/ACT inhaler Inhale 1 puff into the lungs 2 times daily  Qty: 12 g, Refills: 3      guaiFENesin-dextromethorphan (ROBITUSSIN DM) 100-10 MG/5ML syrup Take 5 mLs by mouth every 4 hours as needed for Cough  Qty: 120 mL, Refills: 0      levoFLOXacin (LEVAQUIN) 500 MG/100ML SOLN Infuse 100 mLs intravenously every 24 hours for 2 doses  Qty: 1000 mL, Refills: 0      nicotine (NICODERM CQ) 21 MG/24HR Place 1 patch onto the skin daily  Qty: 30 patch, Refills: 3      Benzocaine-Menthol (CEPACOL) 6-10 MG LOZG lozenge Take 1 lozenge by mouth every 2 hours as needed for Sore Throat  Qty: 30 lozenge, Refills: 0      nystatin (MYCOSTATIN) 127308 UNIT/ML suspension Take 5 mLs by mouth 4 times daily  Qty: 600 mL, Refills: 0      Vitamin D (CHOLECALCIFEROL) 50 MCG (2000 UT) TABS tablet Take 1 tablet by mouth daily  Qty: 60 tablet, Refills: 0    Comments: Labeling may look different. 25 mcg=1000 Units. Please double check dosages. Respiratory Therapy Supplies (FULL KIT NEBULIZER SET) MISC Use as directed with nebulized medication.   Qty: 1 each, Refills: 0    Comments: Supply

## 2021-11-10 NOTE — PROGRESS NOTES
Physical Therapy    Physical Therapy Treatment Note/Plan of Care    Room #:  2823/3104-68  Patient Name: Kareem Thomas  YOB: 1960  MRN: [id number]    Date of Service: 11/10/2021     Tentative placement recommendation: Subacute vs Home Health Physical Therapy if patient meets goals  Equipment recommendation: To be determined      Evaluating Physical Therapist: Josette Vasquez  #[id number]      Specific Provider Orders/Date/Referring Provider :  11/01/21 1815   PT evaluation and treat Start: 11/01/21 1815, End: 11/01/21 1815, ONE TIME, Standing Count: 1 Occurrences, Hussain Dolan MD      Admitting Diagnosis:   WILLIE (acute kidney injury) ([facility] [address] [N17.9]  Acute respiratory failure with hypoxia ([address] [J96.01]  COVID [U07.1]       Surgery: none  Visit Diagnoses       Codes    COVID     U07.1          Patient Active Problem List   Diagnosis    Chronic pain of right knee    Osteoarthritis of spine with radiculopathy, cervical region    Cervical disc disorder    Cervical radiculopathy    Chronic pain syndrome    Acute pain of right knee    Acute respiratory failure with hypoxia and hypercapnia (HCC)    CAP (community acquired pneumonia)    COPD exacerbation ([address]    Depression    Acute exacerbation of chronic obstructive airways disease ([address]    Respiratory distress    WILLIE (acute kidney injury) ([address]    Schizophrenia ([address]    Normal anion gap metabolic acidosis    Acute respiratory failure with hypoxia (HCC)        ASSESSMENT of Current Deficits Patient exhibits decreased strength, balance, endurance and coordination impairing functional mobility, transfers, gait , gait distance and tolerance to activity are barriers to d/c and require skilled intervention during hospital stay to attain pre hospital level of function. Patient  willing to walk in the hallway. Pt c/o dizziness once sitting up but resolved. Distance limited due to fatigue, slow tomer.  Pt at risk for falls due to distance her c/o pain with both legs, shortness of breath, dizziness, and impaired endurance. PHYSICAL THERAPY  PLAN OF CARE       Physical therapy plan of care is established based on physician order,  patient diagnosis and clinical assessment    Current Treatment Recommendations:    -Standing Balance: Perform strengthening exercises in standing to promote motor control with or without upper extremity support   -Transfers: Provide instruction on proper hand and foot position for adequate transfer of weight onto lower extremities and use of gait device if needed and Cues for hand placement, technique and safety. Provide stabilization to prevent fall   -Gait: Gait training and Standing activities to improve: base of support, weight shift, weight bearing    -Endurance: Utilize Supervised activities to increase level of endurance to allow for safe functional mobility including transfers and gait   -Stairs: Stair training with instruction on proper technique and hand placement on rail    PT long term treatment goals are located in below grid    Patient and or family understand(s) diagnosis, prognosis, and plan of care. Frequency of treatments: Patient will be seen  daily. Prior Level of Function: Patient ambulated independently    Rehab Potential: good    for baseline    Past medical history:   Past Medical History:   Diagnosis Date    Alcoholism ([facility] [address]     H/O    COPD (chronic obstructive pulmonary disease) ([facility] [address]     Dental caries     Hypertension     no meds    Lung nodules      [place]     Schizophrenia ([facility] [address]     stable    Ventricular hypokinesis     H/O     Past Surgical History:   Procedure Laterality Date    APPENDECTOMY       SECTION      2 C SECTION    ECHO COMPLETE  1/10/2014         [place] DENTAL SURGERY PROCEDURE N/A 2018    MULTIPLE DENTAL EXTRACTIONS AND ALVELOPLASTY performed by Adam Sanabria DDS at [address] [place] [address] Right     WRIST SURGERY Right        SUBJECTIVE:    Precautions: Up as tolerated, falls, alarm and Droplet plus/COVID-19 ,    Social history: Patient lives with son in a apartment    with 3 steps  to enter      Tub shower grab bars    Equipment owned: None,       [id number] [facility]  Mobility Inpatient   How much difficulty turning over in bed?: None  How much difficulty sitting down on / standing up from a chair with arms?: A Little  How much difficulty moving from lying on back to sitting on side of bed?: None  How much help from another person moving to and from a bed to a chair?: A Little  How much help from another person needed to walk in hospital room?: A Little  How much help from another person for climbing 3-5 steps with a railing?: A Lot  AM-PAC Inpatient Mobility Raw Score : 19  AM-PAC Inpatient T-Scale Score : 45.44  Mobility Inpatient CMS 0-100% Score: 41.77  Mobility Inpatient CMS G-Code Modifier : CK    Nursing cleared patient for PT treatment. .   OBJECTIVE;   Initial Evaluation  Date: 11/3/2021 Treatment Date:  11/10/2021       Short Term/ Long Term   Goals   Was pt agreeable to Eval/treatment? Yes yes To be met in 4 days   Pain level   0/10    8/10   Pain with both legs, stomach and neck      Bed Mobility    Rolling: Independent    Supine to sit: Independent    Sit to supine: Independent    Scooting: Independent    Rolling: Independent   Supine to sit:  Independent   Sit to supine: Independent   Scooting: Independent       Transfers Sit to stand: Minimal assist of 1 x 3 reps  Sit to stand: Minimal assist of 1 Cues for hand placement and safety        Sit to stand: Independent     Ambulation    40 feet using  IV pole with Minimal assist of 1   for balance, upright, multiplane instability, safety and dizziness, Patient with shuffling steps and flexed posture and cues for upright posture and safety 2x35 feet using  wheeled walker with Supervision    cues for upright posture, walker approximation, safety, pacing and pursed lip breathing       100 feet using  wheeled walker with Supervision     Stair negotiation: ascended and descended   Not assessed  Not assessed     3 steps with rail supervision    ROM Within functional limits        Strength BUE:  refer to OT eval  RLE:  4/5  LLE:  4/5  Increase strength in affected mm groups by 1/3 grade   Balance Sitting EOB:  fair  impulsive  Dynamic Standing:  poor requires bilateral hand support, dizzy, hypotensive Sitting EOB: good   Dynamic Standing: fair wheeled walker    Sitting EOB:  good    Dynamic Standing: good with wheeled walker      Patient is Alert & Oriented x person, place, time and situation and follows directions  Anxious, impulsive  Sensation:  Patient  denies numbness/tingling   Edema:  no   Endurance: poor  Tolerance to upright with dizziness and elevated hr    Vitals: room air   Blood Pressure at rest  Blood Pressure during session    Heart Rate at rest  Heart Rate during session    SPO2 at rest 95% SPO2 during session 92-95%     Patient education  Patient educated on role of Physical Therapy, risks of immobility, safety and plan of care, importance of positional changes for oxygen exchange,  importance of mobility while in hospital  and safety      Patient response to education:   Pt verbalized understanding Pt demonstrated skill Pt requires further education in this area   Yes Partial Yes      Treatment:  Patient practiced and was instructed/facilitated in the following treatment: Patient assisted to edge of bedSat edge of bed 5 minutes with Supervision  to increase dynamic sitting balance and activity tolerance. Educated and performed pursed lip breathing Pt stood, ambulated in the hallway, and back to the bed. Pt performed seated exercises. Therapeutic Exercises:  ankle pumps, long arc quad and seated marching, x 10 reps.     At end of session, patient in bed with alarm call light and phone within reach,  all lines and tubes intact, nursing notified. Patient would benefit from continued skilled Physical Therapy to improve functional independence and quality of life.          Patient's/ family goals   home    Time in 106  Time out  120    Total Treatment Time  14 minutes        CPT codes:  Therapeutic exercises (90524)   4 minutes  0 unit(s)  Gait Training (13935) 10 minutes 1 unit(s)    Sarah Mayes, [address] [facility] #[id number]

## 2021-11-10 NOTE — CARE COORDINATION
SOCIAL WORK / DISCHARGE PLANNING:  COVID positive 11/01. [address] remains pending for [facility] [phone] fax # [phone]. Hopeful that it will be resulted today. Room air. CECILIA will need signed by physician. HENS form initiated, will need completed when dc order is obtained.                    Electronically signed by BUCK Moore on 11/10/2021 at 10:02 AM

## 2021-11-11 VITALS
HEIGHT: 71 IN | RESPIRATION RATE: 18 BRPM | DIASTOLIC BLOOD PRESSURE: 68 MMHG | WEIGHT: 145 LBS | SYSTOLIC BLOOD PRESSURE: 111 MMHG | TEMPERATURE: 97.8 F | HEART RATE: 90 BPM | BODY MASS INDEX: 20.3 KG/M2 | OXYGEN SATURATION: 93 %

## 2021-11-11 PROCEDURE — 6370000000 HC RX 637 (ALT 250 FOR IP): Performed by: INTERNAL MEDICINE

## 2021-11-11 PROCEDURE — 6360000002 HC RX W HCPCS: Performed by: INTERNAL MEDICINE

## 2021-11-11 PROCEDURE — 6370000000 HC RX 637 (ALT 250 FOR IP): Performed by: SPECIALIST

## 2021-11-11 PROCEDURE — 6360000002 HC RX W HCPCS

## 2021-11-11 PROCEDURE — 97110 THERAPEUTIC EXERCISES: CPT

## 2021-11-11 PROCEDURE — 2580000003 HC RX 258: Performed by: INTERNAL MEDICINE

## 2021-11-11 PROCEDURE — 6370000000 HC RX 637 (ALT 250 FOR IP): Performed by: HOSPITALIST

## 2021-11-11 PROCEDURE — 99232 SBSQ HOSP IP/OBS MODERATE 35: CPT | Performed by: INTERNAL MEDICINE

## 2021-11-11 PROCEDURE — 94640 AIRWAY INHALATION TREATMENT: CPT

## 2021-11-11 PROCEDURE — 97530 THERAPEUTIC ACTIVITIES: CPT

## 2021-11-11 RX ORDER — DEXAMETHASONE SODIUM PHOSPHATE 10 MG/ML
INJECTION INTRAMUSCULAR; INTRAVENOUS
Status: COMPLETED
Start: 2021-11-11 | End: 2021-11-11

## 2021-11-11 RX ADMIN — BENZTROPINE MESYLATE 1 MG: 1 TABLET ORAL at 10:07

## 2021-11-11 RX ADMIN — BUSPIRONE HYDROCHLORIDE 10 MG: 10 TABLET ORAL at 10:06

## 2021-11-11 RX ADMIN — ENOXAPARIN SODIUM 30 MG: 100 INJECTION SUBCUTANEOUS at 12:54

## 2021-11-11 RX ADMIN — GABAPENTIN 600 MG: 300 CAPSULE ORAL at 10:06

## 2021-11-11 RX ADMIN — Medication 10 ML: at 10:08

## 2021-11-11 RX ADMIN — DEXAMETHASONE SODIUM PHOSPHATE 6 MG: 10 INJECTION INTRAMUSCULAR; INTRAVENOUS at 10:07

## 2021-11-11 RX ADMIN — Medication 2000 UNITS: at 10:06

## 2021-11-11 RX ADMIN — LURASIDONE HYDROCHLORIDE 40 MG: 40 TABLET, FILM COATED ORAL at 10:06

## 2021-11-11 RX ADMIN — FLUOXETINE 60 MG: 20 CAPSULE ORAL at 10:06

## 2021-11-11 RX ADMIN — NYSTATIN 500000 UNITS: 100000 SUSPENSION ORAL at 10:05

## 2021-11-11 RX ADMIN — NYSTATIN 500000 UNITS: 100000 SUSPENSION ORAL at 12:54

## 2021-11-11 RX ADMIN — NYSTATIN 500000 UNITS: 100000 SUSPENSION ORAL at 16:17

## 2021-11-11 RX ADMIN — FLUTICASONE PROPIONATE 1 PUFF: 110 AEROSOL, METERED RESPIRATORY (INHALATION) at 17:34

## 2021-11-11 RX ADMIN — IPRATROPIUM BROMIDE AND ALBUTEROL 1 PUFF: 20; 100 SPRAY, METERED RESPIRATORY (INHALATION) at 17:33

## 2021-11-11 ASSESSMENT — ENCOUNTER SYMPTOMS
SHORTNESS OF BREATH: 1
COUGH: 1

## 2021-11-11 ASSESSMENT — PAIN SCALES - GENERAL: PAINLEVEL_OUTOF10: 0

## 2021-11-11 NOTE — PLAN OF CARE
Problem: Airway Clearance - Ineffective  Goal: Achieve or maintain patent airway  Outcome: Met This Shift     Problem: Gas Exchange - Impaired  Goal: Promote optimal lung function  Outcome: Met This Shift     Problem: Breathing Pattern - Ineffective  Goal: Ability to achieve and maintain a regular respiratory rate  Outcome: Met This Shift

## 2021-11-11 NOTE — PLAN OF CARE
Problem: Inadequate oral food/beverage intake (NI-2.1)  Goal: Food and/or Nutrient Delivery  Description: Individualized approach for food/nutrient provision. 11/11/2021 0827 by Ronn Keller RD, [place]  Outcome: Met This Shift     Problem: Inadequate oral food/beverage intake (NI-2.1)  Goal: Food and/or Nutrient Delivery  Description: Individualized approach for food/nutrient provision. 11/11/2021 0827 by Ronn Keller RD, [place]  Outcome: Met This Shift     Problem: Airway Clearance - Ineffective  Goal: Achieve or maintain patent airway  11/11/2021 1137 by Moe Vásquez RN  Outcome: Ongoing  11/10/2021 2224 by Inessa Merrill RN  Outcome: Met This Shift     Problem: Gas Exchange - Impaired  Goal: Absence of hypoxia  Outcome: Ongoing  Goal: Promote optimal lung function  11/11/2021 1137 by Moe Vásquez RN  Outcome: Ongoing  11/10/2021 2224 by Inessa Merrill RN  Outcome: Met This Shift     Problem: Breathing Pattern - Ineffective  Goal: Ability to achieve and maintain a regular respiratory rate  11/11/2021 1137 by Moe Vásquez RN  Outcome: Ongoing  11/10/2021 2224 by Inessa Merrill RN  Outcome: Met This Shift     Problem:  Body Temperature -  Risk of, Imbalanced  Goal: Ability to maintain a body temperature within defined limits  Outcome: Ongoing  Goal: Will regain or maintain usual level of consciousness  Outcome: Ongoing  Goal: Complications related to the disease process, condition or treatment will be avoided or minimized  Outcome: Ongoing     Problem: Isolation Precautions - Risk of Spread of Infection  Goal: Prevent transmission of infection  Outcome: Ongoing     Problem: Nutrition Deficits  Goal: Optimize nutritional status  Outcome: Ongoing     Problem: Risk for Fluid Volume Deficit  Goal: Maintain normal heart rhythm  Outcome: Ongoing  Goal: Maintain absence of muscle cramping  Outcome: Ongoing  Goal: Maintain normal serum potassium, sodium, calcium, phosphorus, and pH  Outcome: Ongoing Problem: Loneliness or Risk for Loneliness  Goal: Demonstrate positive use of time alone when socialization is not possible  Outcome: Ongoing     Problem: Fatigue  Goal: Verbalize increase energy and improved vitality  Outcome: Ongoing     Problem: Patient Education: Go to Patient Education Activity  Goal: Patient/Family Education  Outcome: Ongoing     Problem: Pain:  Goal: Pain level will decrease  Description: Pain level will decrease  Outcome: Ongoing  Goal: Control of acute pain  Description: Control of acute pain  Outcome: Ongoing  Goal: Control of chronic pain  Description: Control of chronic pain  Outcome: Ongoing     Problem: Falls - Risk of:  Goal: Will remain free from falls  Description: Will remain free from falls  Outcome: Ongoing  Goal: Absence of physical injury  Description: Absence of physical injury  Outcome: Ongoing     Problem: Skin Integrity:  Goal: Will show no infection signs and symptoms  Description: Will show no infection signs and symptoms  Outcome: Ongoing  Goal: Absence of new skin breakdown  Description: Absence of new skin breakdown  Outcome: Ongoing     Problem: Airway Clearance - Ineffective  Goal: Achieve or maintain patent airway  11/11/2021 1137 by Octavio Maldonado RN  Outcome: Ongoing  11/10/2021 2224 by Inessa Esquivel RN  Outcome: Met This Shift     Problem: Gas Exchange - Impaired  Goal: Absence of hypoxia  Outcome: Ongoing  Goal: Promote optimal lung function  11/11/2021 1137 by Octavio Maldonado RN  Outcome: Ongoing  11/10/2021 2224 by Inessa Esquivel RN  Outcome: Met This Shift     Problem: Breathing Pattern - Ineffective  Goal: Ability to achieve and maintain a regular respiratory rate  11/11/2021 1137 by Octavio Maldonado RN  Outcome: Ongoing  11/10/2021 2224 by Inessa Esquivel RN  Outcome: Met This Shift     Problem:  Body Temperature -  Risk of, Imbalanced  Goal: Ability to maintain a body temperature within defined limits  Outcome: Ongoing  Goal: Will regain or maintain usual level of consciousness  Outcome: Ongoing  Goal: Complications related to the disease process, condition or treatment will be avoided or minimized  Outcome: Ongoing     Problem: Isolation Precautions - Risk of Spread of Infection  Goal: Prevent transmission of infection  Outcome: Ongoing     Problem: Nutrition Deficits  Goal: Optimize nutritional status  Outcome: Ongoing     Problem: Risk for Fluid Volume Deficit  Goal: Maintain normal heart rhythm  Outcome: Ongoing  Goal: Maintain absence of muscle cramping  Outcome: Ongoing  Goal: Maintain normal serum potassium, sodium, calcium, phosphorus, and pH  Outcome: Ongoing     Problem: Loneliness or Risk for Loneliness  Goal: Demonstrate positive use of time alone when socialization is not possible  Outcome: Ongoing     Problem: Fatigue  Goal: Verbalize increase energy and improved vitality  Outcome: Ongoing     Problem: Patient Education: Go to Patient Education Activity  Goal: Patient/Family Education  Outcome: Ongoing     Problem: Pain:  Goal: Pain level will decrease  Description: Pain level will decrease  Outcome: Ongoing  Goal: Control of acute pain  Description: Control of acute pain  Outcome: Ongoing  Goal: Control of chronic pain  Description: Control of chronic pain  Outcome: Ongoing     Problem: Falls - Risk of:  Goal: Will remain free from falls  Description: Will remain free from falls  Outcome: Ongoing  Goal: Absence of physical injury  Description: Absence of physical injury  Outcome: Ongoing     Problem: Skin Integrity:  Goal: Will show no infection signs and symptoms  Description: Will show no infection signs and symptoms  Outcome: Ongoing  Goal: Absence of new skin breakdown  Description: Absence of new skin breakdown  Outcome: Ongoing

## 2021-11-11 NOTE — PROGRESS NOTES
Physical Therapy    Physical Therapy Treatment Note/Plan of Care    Room #:  9091/1189-44  Patient Name: Valeria Rizvi  YOB: 1960  MRN: [id number]    Date of Service: 11/11/2021     Tentative placement recommendation: Subacute vs Home Health Physical Therapy if patient meets goals  Equipment recommendation: To be determined      Evaluating Physical Therapist: Josette Simons  #[id number]      Specific Provider Orders/Date/Referring Provider :  11/01/21 1815   PT evaluation and treat Start: 11/01/21 1815, End: 11/01/21 1815, ONE TIME, Standing Count: 1 Occurrences, José Maravilla MD      Admitting Diagnosis:   WILLIE (acute kidney injury) ([facility] [address] [N17.9]  Acute respiratory failure with hypoxia ([facility] [address] [J96.01]  COVID [U07.1]       Surgery: none  Visit Diagnoses       Codes    COVID     U07.1          Patient Active Problem List   Diagnosis    Chronic pain of right knee    Osteoarthritis of spine with radiculopathy, cervical region    Cervical disc disorder    Cervical radiculopathy    Chronic pain syndrome    Acute pain of right knee    Acute respiratory failure with hypoxia and hypercapnia (HCC)    CAP (community acquired pneumonia)    COPD exacerbation ([address]    Depression    Acute exacerbation of chronic obstructive airways disease ([address]    Respiratory distress    WILLIE (acute kidney injury) ([address]    Schizophrenia ([address]    Normal anion gap metabolic acidosis    Acute respiratory failure with hypoxia (HCC)        ASSESSMENT of Current Deficits Patient exhibits decreased strength, balance, endurance and coordination impairing functional mobility, transfers, gait , gait distance and tolerance to activity are barriers to d/c and require skilled intervention during hospital stay to attain pre hospital level of function. Patient limited gait distance this treatment session due to nausea; however motivated to work with therapy.  Complaints of lightheadedness throughout session, increased rest breaks due to being light headed and nauseated. slow tomer and shortness of breath noted, cues for upright posture and pursed lip breathing to maintain spo2 above 90% during ambulation. Pt at risk for falls due to distance, shortness of breath, dizziness, nauseation, lightheadedness and impaired endurance. PHYSICAL THERAPY  PLAN OF CARE       Physical therapy plan of care is established based on physician order,  patient diagnosis and clinical assessment    Current Treatment Recommendations:    -Standing Balance: Perform strengthening exercises in standing to promote motor control with or without upper extremity support   -Transfers: Provide instruction on proper hand and foot position for adequate transfer of weight onto lower extremities and use of gait device if needed and Cues for hand placement, technique and safety. Provide stabilization to prevent fall   -Gait: Gait training and Standing activities to improve: base of support, weight shift, weight bearing    -Endurance: Utilize Supervised activities to increase level of endurance to allow for safe functional mobility including transfers and gait   -Stairs: Stair training with instruction on proper technique and hand placement on rail    PT long term treatment goals are located in below grid    Patient and or family understand(s) diagnosis, prognosis, and plan of care. Frequency of treatments: Patient will be seen  daily. Prior Level of Function: Patient ambulated independently    Rehab Potential: good    for baseline    Past medical history:   Past Medical History:   Diagnosis Date    Alcoholism ([facility] [address]     H/O    COPD (chronic obstructive pulmonary disease) ([facility] [address]     Dental caries     Hypertension     no meds    Lung nodules      [place].     Schizophrenia ([facility] [address]     stable    Ventricular hypokinesis     H/O     Past Surgical History:   Procedure Laterality Date    APPENDECTOMY       SECTION      2 C SECTION    ECHO COMPLETE  1/10/2014         [place] DENTAL SURGERY PROCEDURE N/A 6/28/2018    MULTIPLE DENTAL EXTRACTIONS AND ALVELOPLASTY performed by Taye Lazaro DDS at [address] [place] [address] Right     WRIST SURGERY Right        SUBJECTIVE:    Precautions: Up as tolerated, falls, alarm and Droplet plus/COVID-19 ,    Social history: Patient lives with son in a apartment    with 3 steps  to enter      Tub shower grab bars    Equipment owned: None,       [id number] [facility]  Mobility Inpatient   How much difficulty turning over in bed?: None  How much difficulty sitting down on / standing up from a chair with arms?: A Little  How much difficulty moving from lying on back to sitting on side of bed?: None  How much help from another person moving to and from a bed to a chair?: A Little  How much help from another person needed to walk in hospital room?: A Little  How much help from another person for climbing 3-5 steps with a railing?: A Lot  AM-PAC Inpatient Mobility Raw Score : 19  AM-PAC Inpatient T-Scale Score : 45.44  Mobility Inpatient CMS 0-100% Score: 41.77  Mobility Inpatient CMS G-Code Modifier : CK    Nursing cleared patient for PT treatment. .   OBJECTIVE;   Initial Evaluation  Date: 11/3/2021 Treatment Date:  11/11/2021       Short Term/ Long Term   Goals   Was pt agreeable to Eval/treatment? Yes yes To be met in 4 days   Pain level   0/10    Not stated       Bed Mobility    Rolling: Independent    Supine to sit: Independent    Sit to supine: Independent    Scooting: Independent    Rolling: Independent   Supine to sit: Independent   Sit to supine: Independent   Scooting: Independent       Transfers Sit to stand: Minimal assist of 1 x 3 reps  Sit to stand: Minimal assist of 1 for initial stand supervision throughout rest of session. Cues for hand placement and safety   and use of grab bar in restroom.      Sit to stand: Independent     Ambulation    40 feet using  IV pole with Minimal assist of 1   for balance, upright, multiplane instability, safety and dizziness, Patient with shuffling steps and flexed posture and cues for upright posture and safety 2x25 feet and 2x18 feet using  wheeled walker with Supervision    cues for upright posture, walker approximation, safety, pacing and pursed lip breathing       100 feet using  wheeled walker with Supervision     Stair negotiation: ascended and descended   Not assessed  Not assessed     3 steps with rail supervision    ROM Within functional limits        Strength BUE:  refer to OT eval  RLE:  4/5  LLE:  4/5  Increase strength in affected mm groups by 1/3 grade   Balance Sitting EOB:  fair  impulsive  Dynamic Standing:  poor requires bilateral hand support, dizzy, hypotensive Sitting EOB: good   Dynamic Standing: fair wheeled walker    Sitting EOB:  good    Dynamic Standing: good with wheeled walker      Patient is Alert & Oriented x person, place, time and situation and follows directions  Anxious, impulsive  Sensation:  Patient  denies numbness/tingling   Edema:  no   Endurance: poor  Tolerance to upright with dizziness/ light headedness and elevated hr    Vitals: room air   Blood Pressure at rest  Blood Pressure during session    Heart Rate at rest  Heart Rate during session    SPO2 at rest 93% SPO2 during session 92-93%     Patient education  Patient educated on role of Physical Therapy, risks of immobility, safety and plan of care, importance of positional changes for oxygen exchange,  importance of mobility while in hospital  and safety      Patient response to education:   Pt verbalized understanding Pt demonstrated skill Pt requires further education in this area   Yes Partial Yes      Treatment:  Patient practiced and was instructed/facilitated in the following treatment: Patient transferred to edge of bed stood and ambulated into restroom, washed hands at sink and back to edge of bed. Performed seated exercise.  Sat edge of bed 8  minutes with Supervision  to increase dynamic sitting balance and activity tolerance. stood and ambulated in room again and back to edge of bed. Stood and took steps to head of bed. Assisted back to supine, positioned for comfort. Educated and performed pursed lip breathing. Therapeutic Exercises:  ankle pumps, heel raises, hip abduction/adduction, long arc quad and seated marching, x 12 reps. At end of session, patient in bed with alarm call light and phone within reach,  all lines and tubes intact, nursing notified. Patient would benefit from continued skilled Physical Therapy to improve functional independence and quality of life.          Patient's/ family goals   home    Time in 36  Time out 1029  Time in 1034  Time out 1057    Total Treatment Time  25 minutes        CPT codes:  Therapeutic activities (58584)   16 minutes  1 unit(s)  Therapeutic exercises (73616)   9 minutes  1 unit(s)    Reynaldo Gibson [facility]#[id number]

## 2021-11-11 NOTE — PROGRESS NOTES
[facility]     [address] [place] [address]  [place], [address]  Phone [phone]   [phone]      Admit Date: 2021  8:52 AM  Pt Name: Janee Weir  MRN: [id number]  : 1960  Reason for Consult:    Chief Complaint   Patient presents with    Shortness of Breath     hx COPD and Asthma, states always SOB but worse today.  Cough     Requesting Physician:  Romana Emmer, MD  PCP: CHICHO Wagoner CNP  History Obtained From:  patient, chart   ID consulted for WILLIE (acute kidney injury) ([facility] [address] [N17.9]  Acute respiratory failure with hypoxia ([facility] [address] [J96.01]  COVID [U07.1]  on hospital day        Chief Complaint   Patient presents with    Shortness of Breath     hx COPD and Asthma, states always SOB but worse today.  Cough     HISTORYOF PRESENT ILLNESS   Janee Weir is a 63 y.o. female who presents with   has a past medical history of Alcoholism ([facility] [address] COPD (chronic obstructive pulmonary disease) ([facility] [address] Dental caries, Hypertension, Lung nodules, Schizophrenia ([address] and Ventricular hypokinesis. Shortness of Breath    Cough  Associated symptoms include shortness of breath.      PT CAME IN WITH COUGH FEVERS, HA CHEST PAIN AND ABD PAIN   PT WA SON 6L  PT IS CURRENTLY ON ROOM AIR  SHE DENIES EXPOSURE  WBC 9.5 CR1.2 COVID +  ID WAS ASKED TO SEE FOR BACTEREMIA CONS  CURRENTYL AFEBRILE  PT HAS ORTHOPEDIC  HARDWARE   PT IS SEXUALLY ACTIVE  PT HAS NO APPETITE DOES NOT WANT TO EAT   LIVES ALONE    CURRENT VISIT  21   ON RA  Has no c/o f/c/n/v/d  FOR D/C   REVIEW OF SYSTEMS     CONSTITUTIONAL:   No fever, chills, weight loss  ALLERGIES:    No urticaria, hay fever,    EYES:     No blurry vision, loss of vision, eye pain  ENT:      No hearing loss, sore throat  CARDIOVASCULAR:  No chest pain or palpitations  RESPIRATORY:   No cough, sob  ENDOCRINE:    No increase thirst, urination   HEME-LYMPH:   No easy bruising or bleeding  GI:     No nausea, vomiting or diarrhea  :     No urinary complaints  NEURO:    No seizures, stroke, HA  MUSCULOSKELETAL:    muscle aches or pain, no joint pain  SKIN:     No rash or itch  PSYCH:    No depression or anxiety    Medications Prior to Admission: benztropine (COGENTIN) 1 MG tablet, Take 1 mg by mouth 2 times daily  lurasidone (LATUDA) 40 MG TABS tablet, Take 40 mg by mouth daily  busPIRone (BUSPAR) 10 MG tablet, Take 10 mg by mouth 2 times daily   [DISCONTINUED] clonazePAM (KLONOPIN) 2 MG tablet, Take 1 mg by mouth 2 times daily as needed for Anxiety. [DISCONTINUED] FLUoxetine (PROZAC) 40 MG capsule, Take 40 mg by mouth daily Take with Prozac 20 mg for a total dose of 60 mg daily. [DISCONTINUED] gabapentin (NEURONTIN) 600 MG tablet, Take 600 mg by mouth 2 times daily. [DISCONTINUED] traZODone (DESYREL) 50 MG tablet, Take  mg by mouth nightly as needed for Sleep   [DISCONTINUED] FLUoxetine (PROZAC) 20 MG capsule, Take 20 mg by mouth daily Take with Prozac 40 mg for a total dose of 60 mg daily.   CURRENT MEDICATIONS     Current Facility-Administered Medications:     nystatin (MYCOSTATIN) 539756 UNIT/ML suspension 500,000 Units, 5 mL, Oral, 4x Daily, Mckay Banerjee MD, 500,000 Units at 11/11/21 1005    Benzocaine-Menthol (CEPACOL) 1 lozenge, 1 lozenge, Oral, Q2H PRN, Mckayla Gayle MD, 1 lozenge at 11/07/21 2118    nicotine (NICODERM CQ) 21 MG/24HR 1 patch, 1 patch, TransDERmal, Daily, Naz Rodriguez MD, 1 patch at 11/11/21 1008    benztropine (COGENTIN) tablet 1 mg, 1 mg, Oral, BID, Mckayla Gayle MD, 1 mg at 11/11/21 1007    busPIRone (BUSPAR) tablet 10 mg, 10 mg, Oral, BID, Mckayla Gayle MD, 10 mg at 11/11/21 1006    traZODone (DESYREL) tablet 50 mg, 50 mg, Oral, Nightly PRN, Mckayla Gayle MD, 50 mg at 11/08/21 2106    sodium chloride flush 0.9 % injection 5-40 mL, 5-40 mL, IntraVENous, 2 times per day, Mckayla Gayle MD, 10 mL at 11/11/21 1008    sodium chloride flush 0.9 % injection 5-40 mL, 5-40 mL, IntraVENous, PRN, Vadim Lockwood MD    0.9 % sodium chloride infusion, 25 mL, IntraVENous, PRN, Vadim Lockwood MD    enoxaparin (LOVENOX) injection 30 mg, 30 mg, SubCUTAneous, BID, Vadim Lockwood MD, 30 mg at 11/10/21 2051    ondansetron (ZOFRAN-ODT) disintegrating tablet 4 mg, 4 mg, Oral, Q8H PRN, 4 mg at 11/09/21 0842 **OR** ondansetron (ZOFRAN) injection 4 mg, 4 mg, IntraVENous, Q6H PRN, Vadim Lockwood MD, 4 mg at 11/03/21 1302    polyethylene glycol (GLYCOLAX) packet 17 g, 17 g, Oral, Daily PRN, Vadim Lockwood MD    acetaminophen (TYLENOL) tablet 650 mg, 650 mg, Oral, Q6H PRN, 650 mg at 11/10/21 2231 **OR** acetaminophen (TYLENOL) suppository 650 mg, 650 mg, Rectal, Q6H PRN, Vadim Lockwood MD    Vitamin D (CHOLECALCIFEROL) tablet 2,000 Units, 2,000 Units, Oral, Daily, Vadim Lockwood MD, 2,000 Units at 11/11/21 1006    guaiFENesin-dextromethorphan (ROBITUSSIN DM) 100-10 MG/5ML syrup 5 mL, 5 mL, Oral, Q4H PRN, Vadim Lockwood MD, 5 mL at 11/07/21 2117    albuterol (PROVENTIL) nebulizer solution 2.5 mg, 2.5 mg, Nebulization, Q6H PRN, Vadim Lockwood MD    lurasidone (LATUDA) tablet 40 mg, 40 mg, Oral, Daily, Vadim Lockwood MD, 40 mg at 11/11/21 1006    clonazePAM (KLONOPIN) tablet 1 mg, 1 mg, Oral, BID PRN, Ricki Kee DO, 1 mg at 11/05/21 2057    gabapentin (NEURONTIN) capsule 600 mg, 600 mg, Oral, BID, Ke Kee DO, 600 mg at 11/11/21 1006    FLUoxetine (PROZAC) capsule 60 mg, 60 mg, Oral, Daily, Ke Kee, , 60 mg at 11/11/21 1006    albuterol-ipratropium (COMBIVENT RESPIMAT)  MCG/ACT inhaler 1 puff, 1 puff, Inhalation, Q6H, Cristi Brewer MD, 1 puff at 11/10/21 2300    fluticasone (FLOVENT HFA) 110 MCG/ACT inhaler 1 puff, 1 puff, Inhalation, BID, Vadim Lockwood MD, 1 puff at 11/10/21 1716  ALLERGIES     Codeine, Dust mite extract, Aspirin, and Penicillins  There is no immunization history for the selected administration types on file for this patient. Internal Administration   First Dose      Second Dose           Last COVID Lab SARS-CoV-2, NAAT (no units)   Date Value   2021 DETECTED (A)            PAST MEDICAL HISTORY     Past Medical History:   Diagnosis Date    Alcoholism ([facility] [address]     H/O    COPD (chronic obstructive pulmonary disease) ([facility] [address] [facility]     Dental caries     Hypertension     no meds    Lung nodules      [place].  Schizophrenia ([facility] [address]     stable    Ventricular hypokinesis     H/O     SURGICAL HISTORY       Past Surgical History:   Procedure Laterality Date    APPENDECTOMY       SECTION      2 C SECTION    ECHO COMPLETE  1/10/2014         MO DENTAL SURGERY PROCEDURE N/A 2018    MULTIPLE DENTAL EXTRACTIONS AND ALVELOPLASTY performed by Adam Sanabria DDS at [address] [place] [address] Right     WRIST SURGERY Right    ·      PHYSICAL EXAM          Vitals:    11/10/21 1630 21 0630 21 0820 21 0853   BP: 100/64 117/65  126/60   Pulse: 86 78  77   Resp: 18 20  18   Temp: 98.9 °F (37.2 °C) 97.5 °F (36.4 °C)  98.6 °F (37 °C)   TempSrc: Axillary Oral  Oral   SpO2: 97% 92%  91%   Weight:       Height:   5' 11\" (1.803 m)      Physical Exam   Constitutional/General: Alert   NAD thin   Head: NC/AT no thrush   Eyes: ANICTERIC   Pulmonary: Lungs DEC  to auscultation bilaterally post   Cardiovascular:  Regular rate and rhythm,    Abdomen: Soft, + BS. No distension. Nontender. Extremities: Moves all extremities x 4.    Warm and well perfused  Skin: Warm and dry   Neurologic:    No focal deficits  Psych: FLAT  Affect     DIAGNOSTIC RESULTS   RADIOLOGY:   XR CHEST PORTABLE    Result Date: 2021  EXAMINATION: ONE XRAY VIEW OF THE CHEST 2021 8:22 am COMPARISON: 2021 HISTORY: ORDERING SYSTEM PROVIDED HISTORY: cough TECHNOLOGIST PROVIDED HISTORY: Reason for exam:->cough FINDINGS: Cardiomediastinal silhouette is unremarkable. No airspace infiltrate, effusion, or pneumothorax is seen. No acute osseous abnormality is identified. No radiographic evidence of acute cardiopulmonary disease process     LABS  No results for input(s): WBC, HGB, HCT, MCV, PLT in the last 72 hours. Recent Labs     11/09/21  1606      K 5.2*   CL 98   CO2 23   BUN 38*   CREATININE 1.0   GFRAA >60   LABGLOM 57   GLUCOSE 104*   PROT 7.0   LABALBU 3.8   CALCIUM 9.4   BILITOT 0.2   ALKPHOS 93   AST 58*   ALT 56*     No results for input(s): PROCAL in the last 72 hours. Lab Results   Component Value Date    CRP 0.3 11/08/2021    CRP 2.4 (H) 11/03/2021    CRP 6.2 (H) 11/02/2021     Lab Results   Component Value Date    SEDRATE 45 (H) 11/01/2021     No results found for: [id number]  Lab Results   Component Value Date    COVID19 DETECTED 11/01/2021     COVID-19/REBEKAH-COV2 LABS  Recent Labs     11/09/21  1606   AST 58*   ALT 56*       MICROBIOLOGY:     Cultures :   Lab Results   Component Value Date    BC 5 Days no growth 11/04/2021    BC 5 Days no growth 11/01/2021    BC 5 Days no growth 09/14/2021    ORG Staphylococcus coagulase-negative 11/01/2021     Lab Results   Component Value Date    BLOODCULT2 5 Days no growth 11/04/2021    BLOODCULT2  11/01/2021     This organism was isolated in one set. Susceptibility testing is not routinely done as this  organism frequently represents skin contamination. Additional testing can be ordered by calling the  Microbiology Department.       BLOODCULT2 5 Days no growth 09/14/2021    ORG Staphylococcus coagulase-negative 11/01/2021       Smear, Respiratory   Date Value Ref Range Status   11/04/2021   Final    Group 6: <25 PMN's/LPF and <25 Epithelial cells/LPF  Polymorphonuclear leukocytes not seen  Epithelial cells not seen  Rare Gram positive cocci       No results found for: MPNEUMO, CLAMYDCU, LABLEGI, AFBCX, FUNGSM, LABFUNG  CULTURE, RESPIRATORY   Date Value Ref Range Status   11/04/2021 Oral Pharyngeal Kathleen present  Final        Urine Culture, Routine   Date Value Ref Range Status   11/01/2021 <10,000 CFU/mL  Mixed gram positive organisms    Final        FINAL IMPRESSION    Patient is a 63 y.o. female who presented with   Chief Complaint   Patient presents with    Shortness of Breath     hx COPD and Asthma, states always SOB but worse today.  Cough    and admitted for WILLIE (acute kidney injury) ([facility] [address] [N17.9]  Acute respiratory failure with hypoxia (HCC) [J96.01]  COVID [U07.1] PT WAS ON 6L WEANED TO RA HAS HYPOXEMIA   Fevers better  Gram positive cocci in clusters looks CONS contaminant   Urine GPO WITH TRICHOMONIASIS s/p flagyl  LEUKOPENIA      REPEAT BLOOD CX ngtd  CHECK URINE GC/CHLAMYDIA  NEG  HIV/HEP C NR    [START ON 11/9/2021] baricitinib (OLUMIANT) tablet 2 mg, Daily can stop   levoFLOXacin (LEVAQUIN) 500 MG/100ML infusion 500 mg, Q24H can stop   enoxaparin (LOVENOX) injection 30 mg, BID    CAN D/C  CAN F/U PRN     Imaging and labs were reviewed per medical records and any ID pertinent labs were addressed with the patient. An opportunity to ask questions was given to the patient/FAMILY and questions were answered. Thank you for involving me in the care of [facility]. Please do not hesitate to call for any questions or concerns.          Electronically signed by Judith Mabry MD on 11/11/2021 at 10:39 AM

## 2021-11-11 NOTE — PROGRESS NOTES
Comprehensive Nutrition Assessment    Type and Reason for Visit:  Reassess    Nutrition Recommendations/Plan: Continue current diet/ONS and monitor    Nutrition Assessment:  Pt remains nutritionally stable at this time consuming ~75% of meals and % of ONS. Admit w/ acute respiratory failure 2/2 COVID/COPD exacerbation. Hx ETOH abuse. Will continue to monitor. Malnutrition Assessment:  Malnutrition Status:  Insufficient data    Context:  Acute Illness     Findings of the 6 clinical characteristics of malnutrition:  Energy Intake:  Mild decrease in energy intake (Comment)  Weight Loss:  Unable to assess (np wt mehods per emr hx)     Body Fat Loss:  Unable to assess     Muscle Mass Loss:  Unable to assess    Fluid Accumulation:  No significant fluid accumulation     Strength:  Not Performed    Estimated Daily Nutrient Needs:  Energy (kcal):   (MSJ 1329 X 1.2 SF); Weight Used for Energy Requirements:  Current     Protein (g):   (1.3-1.5); Weight Used for Protein Requirements:  Current        Fluid (ml/day):  ; Method Used for Fluid Requirements:  1 ml/kcal      Nutrition Related Findings:  A&O X4, I&Os WDL, no edema, abd soft/flat, BS active, K+ 5.2, Hx ETOH abuse      Wounds:  None       Current Nutrition Therapies:    ADULT DIET; Regular  ADULT ORAL NUTRITION SUPPLEMENT; Breakfast, Lunch, Dinner; Standard High Calorie/High Protein Oral Supplement    Anthropometric Measures:  · Height: 5' 11\" (180.3 cm)  · Current Body Weight: 145 lb (65.8 kg) (11/1 actual)    · Usual Body Weight:  (per emr 131-154# no method)     · Ideal Body Weight: 155 lbs; % Ideal Body Weight 93.5 %   · BMI: 20.2  · BMI Categories: Normal Weight (BMI 18.5-24. 9)       Nutrition Diagnosis:   · Inadequate oral intake related to impaired respiratory function as evidenced by poor intake prior to admission    Nutrition Interventions:   Food and/or Nutrient Delivery:  Continue Current Diet, Continue Oral Nutrition Supplement  Nutrition Education/Counseling:  Education not indicated   Coordination of Nutrition Care:  Continue to monitor while inpatient    Goals:  Pt to consume >75% of meals/ONS       Nutrition Monitoring and Evaluation:   Food/Nutrient Intake Outcomes:  Food and Nutrient Intake, Supplement Intake  Physical Signs/Symptoms Outcomes:  Biochemical Data, GI Status, Fluid Status or Edema, Nutrition Focused Physical Findings, Skin, Weight     Discharge Planning:     Too soon to determine     Electronically signed by Mary Hopson RD, LD on 11/11/21 at 8:28 AM EST  Contact: [phone]

## 2021-11-11 NOTE — DISCHARGE SUMMARY
[facility] Physician Discharge Summary       CM [address] [facility] Lara Wagner. Jania [place] [id number]  [address] APRN - CNP  [address] [place] [address]  [address], [address]  [address]  [place] [phone]      As needed      Activity level: Slowly increase as tolerated    Diet: ADULT DIET; Regular  ADULT ORAL NUTRITION SUPPLEMENT; Breakfast, Lunch, Dinner; Standard High Calorie/High Protein Oral Supplement    Labs: None are pending at the discharge    Condition at discharge: Stable    Dispo: Return to facility    Patient ID:  Jose Anguiano  [id number]  63 y.o.  1960    Admit date: 11/1/2021    Discharge date and time:  11/11/2021  3:51 PM    Admission Diagnoses: Active Problems:    Acute respiratory failure with hypoxia (HCC)  Resolved Problems:    * No resolved hospital problems. *      Discharge Diagnoses: Active Problems:    Acute respiratory failure with hypoxia (HCC)  Resolved Problems:    * No resolved hospital problems. *      Consults:  IP CONSULT TO INFECTIOUS DISEASES    Procedures: None significant except if described in hospital course. Hospital Course:   Patient was admitted with respiratory distress. She was noted to be covid positive. She was also in COPD Exacerbation. She was treated with decadrone and improved, but her headache continued to persist.   Blood culture was positive for staph and this was suspected to be contaminant. ID following, currently on flagyl and levaquin for trichomatis and ? Pna.   CT of the head and neck is negative for for fracture or any explanation of the non resolving headache.      1. Acute respiratory failure with hypoxia: Continue breathing treatment & antibiotics  2. Bacteremia: suspected to be contaminant but id on board giving iv levaquin  3.   Persistent mild Headaches (suspected to be due to covid but not sure her baseline). She is able to tolerate it  4. COPD: no longer in exacerbation. 5.  Malnurition: continue to montor  6. Schizophrenia/depression: no change  7 chest pain reproducible on 11/7: CxR: similar chronic changes. Chest pain continues but she does not always mention it since she is a poor historian. On 11/10 she confirms that it is not worse, it is worse with breathing and coughing. She notes that she will have to continue to cough and breathe. She is correct. She relates this sternal pain to her epigastric pain as well.    11/11 mild improvement in pleuritic chest pain      Discharge Exam:  Vitals:    11/10/21 1630 11/11/21 0630 11/11/21 0820 11/11/21 0853   BP: 100/64 117/65  126/60   Pulse: 86 78  77   Resp: 18 20  18   Temp: 98.9 °F (37.2 °C) 97.5 °F (36.4 °C)  98.6 °F (37 °C)   TempSrc: Axillary Oral  Oral   SpO2: 97% 92%  91%   Weight:       Height:   5' 11\" (1.803 m)        No acute distress moist membranes   Normocephalic, without obvious abnormality, atraumatic, external ears without lesions,   Neck supple no cervical lymphadenopathy  Heart has a regular rate and rhythm no murmur  Lungs are clear to auscultation bilaterally with equal movements. Abdomen is soft nontender nondistended no rebound or guarding. No significant peripheral edema good peripheral perfusion. No significant rashes or new skin lesions. No new focality on neuro exam which is overall grossly intact. Affect and mood seem to be appropriate     I/O last 3 completed shifts: In: 360 [P.O.:360]  Out: -   No intake/output data recorded. LABS:  Recent Labs     11/09/21  1606      K 5.2*   CL 98   CO2 23   BUN 38*   CREATININE 1.0   GLUCOSE 104*   CALCIUM 9.4       No results for input(s): WBC, RBC, HGB, HCT, MCV, MCH, MCHC, RDW, PLT, MPV in the last 72 hours. No results for input(s): POCGLU in the last 72 hours.       Imaging:  XR CHEST PORTABLE    Result Date: 11/1/2021  EXAMINATION: ONE XRAY VIEW OF THE CHEST 11/1/2021 8:22 am COMPARISON: 09/14/2021 HISTORY: ORDERING SYSTEM PROVIDED HISTORY: cough TECHNOLOGIST PROVIDED HISTORY: Reason for exam:->cough FINDINGS: Cardiomediastinal silhouette is unremarkable. No airspace infiltrate, effusion, or pneumothorax is seen. No acute osseous abnormality is identified. No radiographic evidence of acute cardiopulmonary disease process         Patient Instructions:   Current Discharge Medication List      START taking these medications    Details   dexamethasone (DECADRON) 6 MG tablet Take 1 tablet by mouth daily (with breakfast) for 3 days  Qty: 3 tablet, Refills: 0      albuterol-ipratropium (COMBIVENT RESPIMAT)  MCG/ACT AERS inhaler Inhale 1 puff into the lungs every 6 hours  Qty: 1 each, Refills: 0      albuterol (PROVENTIL) (2.5 MG/3ML) 0.083% nebulizer solution Take 3 mLs by nebulization every 6 hours as needed for Wheezing  Qty: 120 each, Refills: 3      fluticasone (FLOVENT HFA) 110 MCG/ACT inhaler Inhale 1 puff into the lungs 2 times daily  Qty: 12 g, Refills: 3      guaiFENesin-dextromethorphan (ROBITUSSIN DM) 100-10 MG/5ML syrup Take 5 mLs by mouth every 4 hours as needed for Cough  Qty: 120 mL, Refills: 0      levoFLOXacin (LEVAQUIN) 500 MG/100ML SOLN Infuse 100 mLs intravenously every 24 hours for 2 doses  Qty: 1000 mL, Refills: 0      nicotine (NICODERM CQ) 21 MG/24HR Place 1 patch onto the skin daily  Qty: 30 patch, Refills: 3      Benzocaine-Menthol (CEPACOL) 6-10 MG LOZG lozenge Take 1 lozenge by mouth every 2 hours as needed for Sore Throat  Qty: 30 lozenge, Refills: 0      nystatin (MYCOSTATIN) 496265 UNIT/ML suspension Take 5 mLs by mouth 4 times daily  Qty: 600 mL, Refills: 0      Vitamin D (CHOLECALCIFEROL) 50 MCG (2000 UT) TABS tablet Take 1 tablet by mouth daily  Qty: 60 tablet, Refills: 0    Comments: Labeling may look different. 25 mcg=1000 Units. Please double check dosages.       Respiratory Therapy Supplies (FULL KIT NEBULIZER SET) MISC Use as directed with nebulized medication. Qty: 1 each, Refills: 0    Comments: Supply prescription to Pharmacy or [address] [place] [address] location of patient or coverage preference. Dispense full nebulizer kit - compressor, tubing, mouthpiece, mask, ancillary supplies - per requirements of ordered product, patient preference, or coverage allowances. CONTINUE these medications which have CHANGED    Details   clonazePAM (KLONOPIN) 0.5 MG tablet Take 2 tablets by mouth 2 times daily for 30 days. Instructed to take with sip water am of procedure, if needed. Qty: 60 tablet, Refills: 0    Associated Diagnoses: Undifferentiated schizophrenia ([facility] [address]      gabapentin (NEURONTIN) 300 MG capsule Take 2 capsules by mouth 2 times daily for 30 days. Qty: 90 capsule, Refills: 0      FLUoxetine (PROZAC) 20 MG capsule Take 3 capsules by mouth daily  Qty: 30 capsule, Refills: 3      traZODone (DESYREL) 50 MG tablet Take 1 tablet by mouth nightly as needed for Sleep  Qty: 30 tablet, Refills: 0         CONTINUE these medications which have NOT CHANGED    Details   benztropine (COGENTIN) 1 MG tablet Take 1 mg by mouth 2 times daily      lurasidone (LATUDA) 40 MG TABS tablet Take 40 mg by mouth daily      busPIRone (BUSPAR) 10 MG tablet Take 10 mg by mouth 2 times daily          STOP taking these medications       gabapentin (NEURONTIN) 600 MG tablet Comments:   Reason for Stopping:                 Note that more than 30 minutes was spent in preparing discharge papers, discussing discharge with patient, medication review, etc.    NOTE: This report was transcribed using voice recognition software. Every effort was made to ensure accuracy; however, inadvertent computerized transcription errors may be present.      Signed:  Electronically signed by Kriss Mccullough MD on 11/11/2021 at 3:51 PM

## 2021-11-11 NOTE — CARE COORDINATION
SOCIAL WORK / DISCHARGE PLANNING:  COVID positive 11/01. PRECERT obtained for [facility] [phone] fax # [phone] skilled. Transport arranged via [facility] ( must use ambulance due to COVID+) 630-7pm. RN charge aware.                    Electronically signed by BUCK Belcher on 11/11/2021 at 4:01 PM

## 2021-11-11 NOTE — PROGRESS NOTES
Physician Progress Note      PATIENT:               Eduard Kingston  CSN #:                  [id number]  :                       1960  ADMIT DATE:       2021 8:52 AM  DISCH DATE:  RESPONDING  PROVIDER #:        JOSE CARCAMO          QUERY TEXT:    Pt admitted with AE Copd, COVID-19 infection. Pt noted to have possible   pneumonia documented in H&P and PN. If possible, please document in the   progress notes and discharge summary if you are evaluating and/or treating any   of the following:    Note: CAP and HCAP indicate where the pneumonia was acquired, not a specific   type. The medical record reflects the following:  Risk Factors: COVID-19, COPD, Resp failure,  Clinical Indicators: CT abd: Tree-in-bud nodules in the bilateral lower lungs   are suggestive of an infectious/inflammatory process. ? Infectious   bronchiolitis or aspiration are common causes of tree-in-bud opacities. Proca.21, 0.30, 0.11. Treatment: NS 1L bolus in ER, Rocephin, Vanc, Decadron, Inhalers, N ebs,   Olumiant, Levaquin, Flagyl, ID consult. Thank you, Gertha Runner RN [phone]  Options provided:  -- COVID-19 pneumonia  -- Gram negative pneumonia  -- Gram positive pneumonia  -- Bacterial pneumonia  -- Other - I will add my own diagnosis  -- Disagree - Not applicable / Not valid  -- Disagree - Clinically unable to determine / Unknown  -- Refer to Clinical Documentation Reviewer    PROVIDER RESPONSE TEXT:    This patient has COVID-19 pneumonia.     Query created by: Helen Cooley on 2021 12:30 PM      Electronically signed by:  JOSE CARCAMO 2021 1:21 PM

## 2021-11-11 NOTE — PROGRESS NOTES
Called report to [facility] and spoke with [facility] COLLIN QUINONES RN

## 2023-04-06 ENCOUNTER — HOSPITAL ENCOUNTER (EMERGENCY)
Age: 63
Discharge: HOME | End: 2023-04-07
Attending: EMERGENCY MEDICINE
Payer: COMMERCIAL

## 2023-04-06 ENCOUNTER — HOSPITAL ENCOUNTER (EMERGENCY)
Age: 63
Discharge: HOME | End: 2023-04-06
Payer: COMMERCIAL

## 2023-04-06 ENCOUNTER — APPOINTMENT (OUTPATIENT)
Dept: GENERAL RADIOLOGY | Age: 63
End: 2023-04-06
Payer: COMMERCIAL

## 2023-04-06 VITALS
DIASTOLIC BLOOD PRESSURE: 105 MMHG | RESPIRATION RATE: 18 BRPM | SYSTOLIC BLOOD PRESSURE: 152 MMHG | OXYGEN SATURATION: 96 % | BODY MASS INDEX: 20.22 KG/M2 | WEIGHT: 145 LBS | HEART RATE: 98 BPM

## 2023-04-06 DIAGNOSIS — J44.1 COPD EXACERBATION (HCC): Primary | ICD-10-CM

## 2023-04-06 DIAGNOSIS — R00.2 PALPITATIONS: ICD-10-CM

## 2023-04-06 DIAGNOSIS — F41.0 PANIC ATTACK: Primary | ICD-10-CM

## 2023-04-06 LAB
ALBUMIN SERPL-MCNC: 4.2 G/DL (ref 3.5–5.2)
ALP SERPL-CCNC: 139 U/L (ref 35–104)
ALT SERPL-CCNC: 10 U/L (ref 0–32)
ANION GAP SERPL CALCULATED.3IONS-SCNC: 14 MMOL/L (ref 7–16)
ANION GAP SERPL CALCULATED.3IONS-SCNC: 15 MMOL/L (ref 7–16)
AST SERPL-CCNC: 18 U/L (ref 0–31)
BASOPHILS # BLD: 0.03 E9/L (ref 0–0.2)
BASOPHILS NFR BLD: 0.4 % (ref 0–2)
BILIRUB SERPL-MCNC: 0.4 MG/DL (ref 0–1.2)
BNP BLD-MCNC: 351 PG/ML (ref 0–125)
BUN SERPL-MCNC: 10 MG/DL (ref 6–23)
BUN SERPL-MCNC: 11 MG/DL (ref 6–23)
CALCIUM SERPL-MCNC: 9.4 MG/DL (ref 8.6–10.2)
CALCIUM SERPL-MCNC: 9.5 MG/DL (ref 8.6–10.2)
CHLORIDE SERPL-SCNC: 106 MMOL/L (ref 98–107)
CHLORIDE SERPL-SCNC: 107 MMOL/L (ref 98–107)
CO2 SERPL-SCNC: 23 MMOL/L (ref 22–29)
CO2 SERPL-SCNC: 24 MMOL/L (ref 22–29)
CREAT SERPL-MCNC: 0.8 MG/DL (ref 0.5–1)
CREAT SERPL-MCNC: 0.9 MG/DL (ref 0.5–1)
EKG ATRIAL RATE: 81 BPM
EKG P-R INTERVAL: 112 MS
EKG Q-T INTERVAL: 424 MS
EKG QRS DURATION: 104 MS
EKG QTC CALCULATION (BAZETT): 492 MS
EKG R AXIS: 60 DEGREES
EKG T AXIS: -99 DEGREES
EKG VENTRICULAR RATE: 81 BPM
EOSINOPHIL # BLD: 0.06 E9/L (ref 0.05–0.5)
EOSINOPHIL NFR BLD: 0.9 % (ref 0–6)
ERYTHROCYTE [DISTWIDTH] IN BLOOD BY AUTOMATED COUNT: 13.2 FL (ref 11.5–15)
ERYTHROCYTE [DISTWIDTH] IN BLOOD BY AUTOMATED COUNT: 13.2 FL (ref 11.5–15)
GLUCOSE SERPL-MCNC: 85 MG/DL (ref 74–99)
GLUCOSE SERPL-MCNC: 85 MG/DL (ref 74–99)
HCT VFR BLD AUTO: 38.5 % (ref 34–48)
HCT VFR BLD AUTO: 39.4 % (ref 34–48)
HGB BLD-MCNC: 12.5 G/DL (ref 11.5–15.5)
HGB BLD-MCNC: 12.5 G/DL (ref 11.5–15.5)
IMM GRANULOCYTES # BLD: 0.02 E9/L
IMM GRANULOCYTES NFR BLD: 0.3 % (ref 0–5)
INFLUENZA A BY PCR: NOT DETECTED
INFLUENZA B BY PCR: NOT DETECTED
LYMPHOCYTES # BLD: 1.49 E9/L (ref 1.5–4)
LYMPHOCYTES NFR BLD: 21.3 % (ref 20–42)
MAGNESIUM SERPL-MCNC: 2 MG/DL (ref 1.6–2.6)
MCH RBC QN AUTO: 31.2 PG (ref 26–35)
MCH RBC QN AUTO: 32.1 PG (ref 26–35)
MCHC RBC AUTO-ENTMCNC: 31.7 % (ref 32–34.5)
MCHC RBC AUTO-ENTMCNC: 32.5 % (ref 32–34.5)
MCV RBC AUTO: 98.3 FL (ref 80–99.9)
MCV RBC AUTO: 99 FL (ref 80–99.9)
MONOCYTES # BLD: 0.52 E9/L (ref 0.1–0.95)
MONOCYTES NFR BLD: 7.4 % (ref 2–12)
NEUTROPHILS # BLD: 4.87 E9/L (ref 1.8–7.3)
NEUTS SEG NFR BLD: 69.7 % (ref 43–80)
PLATELET # BLD AUTO: 288 E9/L (ref 130–450)
PLATELET # BLD AUTO: 306 E9/L (ref 130–450)
PMV BLD AUTO: 10.9 FL (ref 7–12)
PMV BLD AUTO: 10.9 FL (ref 7–12)
POTASSIUM SERPL-SCNC: 3.3 MMOL/L (ref 3.5–5)
POTASSIUM SERPL-SCNC: 3.5 MMOL/L (ref 3.5–5)
PROT SERPL-MCNC: 7.5 G/DL (ref 6.4–8.3)
RBC # BLD AUTO: 3.89 E12/L (ref 3.5–5.5)
RBC # BLD AUTO: 4.01 E12/L (ref 3.5–5.5)
SARS-COV-2 RDRP RESP QL NAA+PROBE: NOT DETECTED
SODIUM SERPL-SCNC: 143 MMOL/L (ref 132–146)
SODIUM SERPL-SCNC: 146 MMOL/L (ref 132–146)
TROPONIN, HIGH SENSITIVITY: 11 NG/L (ref 0–9)
TROPONIN, HIGH SENSITIVITY: 7 NG/L (ref 0–9)
TROPONIN, HIGH SENSITIVITY: <6 NG/L (ref 0–9)
WBC # BLD: 7 E9/L (ref 4.5–11.5)
WBC # BLD: 9.8 E9/L (ref 4.5–11.5)

## 2023-04-06 PROCEDURE — 85027 COMPLETE CBC AUTOMATED: CPT

## 2023-04-06 PROCEDURE — 36415 COLL VENOUS BLD VENIPUNCTURE: CPT

## 2023-04-06 PROCEDURE — 93005 ELECTROCARDIOGRAM TRACING: CPT | Performed by: EMERGENCY MEDICINE

## 2023-04-06 PROCEDURE — 84484 ASSAY OF TROPONIN QUANT: CPT

## 2023-04-06 PROCEDURE — 83880 ASSAY OF NATRIURETIC PEPTIDE: CPT

## 2023-04-06 PROCEDURE — 93010 ELECTROCARDIOGRAM REPORT: CPT | Performed by: INTERNAL MEDICINE

## 2023-04-06 PROCEDURE — 87635 SARS-COV-2 COVID-19 AMP PRB: CPT

## 2023-04-06 PROCEDURE — 71046 X-RAY EXAM CHEST 2 VIEWS: CPT

## 2023-04-06 PROCEDURE — 94664 DEMO&/EVAL PT USE INHALER: CPT

## 2023-04-06 PROCEDURE — 6370000000 HC RX 637 (ALT 250 FOR IP): Performed by: PHYSICIAN ASSISTANT

## 2023-04-06 PROCEDURE — 6360000002 HC RX W HCPCS: Performed by: EMERGENCY MEDICINE

## 2023-04-06 PROCEDURE — 93005 ELECTROCARDIOGRAM TRACING: CPT | Performed by: PHYSICIAN ASSISTANT

## 2023-04-06 PROCEDURE — 83735 ASSAY OF MAGNESIUM: CPT

## 2023-04-06 PROCEDURE — 96374 THER/PROPH/DIAG INJ IV PUSH: CPT | Performed by: PHYSICIAN ASSISTANT

## 2023-04-06 PROCEDURE — 99285 EMERGENCY DEPT VISIT HI MDM: CPT | Performed by: PHYSICIAN ASSISTANT

## 2023-04-06 PROCEDURE — 6370000000 HC RX 637 (ALT 250 FOR IP): Performed by: EMERGENCY MEDICINE

## 2023-04-06 PROCEDURE — 96375 TX/PRO/DX INJ NEW DRUG ADDON: CPT | Performed by: PHYSICIAN ASSISTANT

## 2023-04-06 PROCEDURE — 87502 INFLUENZA DNA AMP PROBE: CPT

## 2023-04-06 PROCEDURE — 85025 COMPLETE CBC W/AUTO DIFF WBC: CPT

## 2023-04-06 PROCEDURE — 80053 COMPREHEN METABOLIC PANEL: CPT

## 2023-04-06 PROCEDURE — 80048 BASIC METABOLIC PNL TOTAL CA: CPT

## 2023-04-06 RX ORDER — IPRATROPIUM BROMIDE AND ALBUTEROL SULFATE 2.5; .5 MG/3ML; MG/3ML
1 SOLUTION RESPIRATORY (INHALATION) ONCE
Status: COMPLETED | OUTPATIENT
Start: 2023-04-06 | End: 2023-04-06

## 2023-04-06 RX ORDER — LORAZEPAM 2 MG/ML
0.5 INJECTION INTRAMUSCULAR ONCE
Status: COMPLETED | OUTPATIENT
Start: 2023-04-06 | End: 2023-04-06

## 2023-04-06 RX ORDER — METHYLPREDNISOLONE SODIUM SUCCINATE 125 MG/2ML
125 INJECTION, POWDER, LYOPHILIZED, FOR SOLUTION INTRAMUSCULAR; INTRAVENOUS ONCE
Status: COMPLETED | OUTPATIENT
Start: 2023-04-06 | End: 2023-04-06

## 2023-04-06 RX ORDER — POTASSIUM CHLORIDE 20 MEQ/1
40 TABLET, EXTENDED RELEASE ORAL ONCE
Status: COMPLETED | OUTPATIENT
Start: 2023-04-06 | End: 2023-04-06

## 2023-04-06 RX ADMIN — METHYLPREDNISOLONE SODIUM SUCCINATE 125 MG: 125 INJECTION, POWDER, FOR SOLUTION INTRAMUSCULAR; INTRAVENOUS at 22:45

## 2023-04-06 RX ADMIN — LORAZEPAM 0.5 MG: 2 INJECTION INTRAMUSCULAR; INTRAVENOUS at 22:45

## 2023-04-06 RX ADMIN — IPRATROPIUM BROMIDE AND ALBUTEROL SULFATE 1 AMPULE: .5; 2.5 SOLUTION RESPIRATORY (INHALATION) at 22:19

## 2023-04-06 RX ADMIN — POTASSIUM CHLORIDE 40 MEQ: 1500 TABLET, EXTENDED RELEASE ORAL at 17:13

## 2023-04-06 ASSESSMENT — PAIN DESCRIPTION - PAIN TYPE: TYPE: ACUTE PAIN

## 2023-04-06 ASSESSMENT — PAIN DESCRIPTION - FREQUENCY: FREQUENCY: CONTINUOUS

## 2023-04-06 ASSESSMENT — ENCOUNTER SYMPTOMS
SHORTNESS OF BREATH: 1
NAUSEA: 0
EYE REDNESS: 0
ABDOMINAL DISTENTION: 0
VOMITING: 0
DIARRHEA: 0
BACK PAIN: 0
WHEEZING: 0
SINUS PRESSURE: 0
EYE PAIN: 0
EYE DISCHARGE: 0
COUGH: 1
SORE THROAT: 0

## 2023-04-06 ASSESSMENT — PAIN SCALES - GENERAL: PAINLEVEL_OUTOF10: 6

## 2023-04-06 ASSESSMENT — PAIN DESCRIPTION - LOCATION: LOCATION: CHEST

## 2023-04-06 ASSESSMENT — PAIN - FUNCTIONAL ASSESSMENT: PAIN_FUNCTIONAL_ASSESSMENT: 0-10

## 2023-04-06 NOTE — ED PROVIDER NOTES
otherwise noted. XR CHEST (2 VW)   Final Result   No acute process. COPD. Probable nipple shadow projected over the left mid lower thorax. Recommend   repeat study with nipple markers for confirmation. ED COURSE   Vitals:    Vitals:    04/06/23 1534   BP: (!) 152/105   Pulse: 98   Resp: 18   SpO2: 96%   Weight: 145 lb (65.8 kg)       Patient was given the following medications:  Medications   potassium chloride (KLOR-CON M) extended release tablet 40 mEq (40 mEq Oral Given 4/6/23 1713)            CONSULTS:  None  DIFFERENTIAL DX_MDM   MDM:   Social Determinants : None    Records Reviewed : None_ n/a per encounter visit    CC/HPI Summary, DDx, ED Course, and Reassessment: Patient presents with Panic Attack (Pt had a panic attack while at home and called EMS. Pt denies SI or HI. 25mg Benadryl and 5mg Haldol IM given with improvement. )  The patient was seen and evaluated in the department after having an episode at home where she describes palpitations and anxiety. She was feeling much better by the time I saw her after being given Benadryl and Haldol by EMS. Full work-up was initiated with no evidence of cardiac dysrhythmia. No acute ischemic changes seen on EKG. Her initial troponin was 11 and on repeat dropped to 6. No other abnormalities were identified. The chest x-ray did have a questionable shadow over the left mid thorax and the radiologist suggested repeating imaging with nipple markers. The patient did not want to stay for additional testing and left AGAINST MEDICAL ADVICE. Plan of Care/Counseling:  Wing Pavel PA-C reviewed today's visit with the patient in addition to providing specific details for the plan of care and counseling regarding the diagnosis and prognosis. Questions are answered at this time and are agreeable with the plan. ASSESSMENT     1. Panic attack    2. Palpitations        DISPOSITION   Patient signed-out Against Medical Advice (AMA).   Patient

## 2023-04-07 VITALS
TEMPERATURE: 98.1 F | RESPIRATION RATE: 18 BRPM | SYSTOLIC BLOOD PRESSURE: 124 MMHG | DIASTOLIC BLOOD PRESSURE: 79 MMHG | BODY MASS INDEX: 16.74 KG/M2 | WEIGHT: 120 LBS | OXYGEN SATURATION: 98 % | HEART RATE: 77 BPM

## 2023-04-07 LAB
TROPONIN, HIGH SENSITIVITY: 18 NG/L (ref 0–9)
TROPONIN, HIGH SENSITIVITY: 8 NG/L (ref 0–9)

## 2023-04-07 PROCEDURE — 84484 ASSAY OF TROPONIN QUANT: CPT

## 2023-04-07 RX ORDER — PREDNISONE 20 MG/1
40 TABLET ORAL DAILY
Qty: 10 TABLET | Refills: 0 | Status: SHIPPED | OUTPATIENT
Start: 2023-04-07 | End: 2023-04-12

## 2023-04-07 ASSESSMENT — PAIN - FUNCTIONAL ASSESSMENT: PAIN_FUNCTIONAL_ASSESSMENT: NONE - DENIES PAIN

## 2023-04-07 NOTE — ED NOTES
Daughter answered call at this time or transport home, states she would be on her way     Juanis Ferro RN  04/07/23 6290

## 2023-04-07 NOTE — ED PROVIDER NOTES
EKG.    ---------------------------------------------  Past Medical History:  has a past medical history of Alcoholism ([facility] Dental caries, Hypertension, Lung nodules, Schizophrenia ([facility] and Ventricular hypokinesis. Past Surgical History:  has a past surgical history that includes Echo Complete (1/10/2014); Appendectomy;  section; Wrist surgery (Right); shoulder surgery (Right); and pr unlisted procedure dentoalveolar structures (N/A, 2018). Social History:  reports that she has been smoking. She has a 42.00 pack-year smoking history. She has never used smokeless tobacco. She reports current alcohol use. She reports that she does not use drugs. Family History: family history includes Cancer in her father; Other in her mother. The patients home medications have been reviewed.     Allergies: Codeine, Dust mite extract, Aspirin, and Penicillins    -------------------------------------------------- RESULTS -------------------------------------------------  Labs:  Results for orders placed or performed during the hospital encounter of 23   Rapid influenza A/B antigens    Specimen: Nasopharyngeal   Result Value Ref Range    Influenza A by PCR Not Detected Not Detected    Influenza B by PCR Not Detected Not Detected   COVID-19, Rapid    Specimen: Nasopharyngeal Swab   Result Value Ref Range    SARS-CoV-2, NAAT Not Detected Not Detected   Basic metabolic panel   Result Value Ref Range    Sodium 143 132 - 146 mmol/L    Potassium 3.5 3.5 - 5.0 mmol/L    Chloride 106 98 - 107 mmol/L    CO2 23 22 - 29 mmol/L    Anion Gap 14 7 - 16 mmol/L    Glucose 85 74 - 99 mg/dL    BUN 11 6 - 23 mg/dL    Creatinine 0.9 0.5 - 1.0 mg/dL    Est, Glom Filt Rate >60 >=60 mL/min/1.73    Calcium 9.4 8.6 - 10.2 mg/dL   CBC   Result Value Ref Range    WBC 9.8 4.5 - 11.5 E9/L    RBC 4.01 3.50 - 5.50 E12/L    Hemoglobin 12.5 11.5 - 15.5 g/dL    Hematocrit 39.4 34.0 -

## 2023-04-07 NOTE — ED NOTES
Patient ambulated per order, Sp02 96% on RA with no c/o SOB, HR-99 bpm, tolerated well     Lo Partida, RN  04/07/23 0107

## 2023-04-07 NOTE — ED NOTES
Multiple attempts to contact family for transport with no success.  Patient placed in fermin #5       Franciscaserina You, [facility]  04/07/23 1660

## 2023-04-07 NOTE — ED NOTES
Attempt made to contact daughter with no response and no show at facility for transport of family member      Ricardo Quinones RN  04/07/23 4362

## 2023-04-08 LAB
EKG ATRIAL RATE: 186 BPM
EKG P AXIS: -84 DEGREES
EKG Q-T INTERVAL: 378 MS
EKG QRS DURATION: 82 MS
EKG QTC CALCULATION (BAZETT): 469 MS
EKG R AXIS: 102 DEGREES
EKG T AXIS: -92 DEGREES
EKG VENTRICULAR RATE: 93 BPM

## 2023-04-08 PROCEDURE — 93010 ELECTROCARDIOGRAM REPORT: CPT | Performed by: INTERNAL MEDICINE

## 2023-07-11 ENCOUNTER — APPOINTMENT (OUTPATIENT)
Dept: GENERAL RADIOLOGY | Age: 63
DRG: 817 | End: 2023-07-11
Payer: COMMERCIAL

## 2023-07-11 ENCOUNTER — APPOINTMENT (OUTPATIENT)
Dept: CT IMAGING | Age: 63
DRG: 817 | End: 2023-07-11
Payer: COMMERCIAL

## 2023-07-11 ENCOUNTER — HOSPITAL ENCOUNTER (INPATIENT)
Age: 63
LOS: 7 days | Discharge: HOME | DRG: 817 | End: 2023-07-19
Attending: STUDENT IN AN ORGANIZED HEALTH CARE EDUCATION/TRAINING PROGRAM | Admitting: INTERNAL MEDICINE
Payer: COMMERCIAL

## 2023-07-11 DIAGNOSIS — R45.851 SUICIDAL IDEATION: Primary | ICD-10-CM

## 2023-07-11 LAB
ALBUMIN SERPL-MCNC: 3.8 G/DL (ref 3.5–5.2)
ALP SERPL-CCNC: 363 U/L (ref 35–104)
ALT SERPL-CCNC: 41 U/L (ref 0–32)
AMPHET UR QL SCN: NOT DETECTED
ANION GAP SERPL CALCULATED.3IONS-SCNC: 13 MMOL/L (ref 7–16)
APAP SERPL-MCNC: <5 MCG/ML (ref 10–30)
AST SERPL-CCNC: 65 U/L (ref 0–31)
BACTERIA URNS QL MICRO: ABNORMAL /HPF
BARBITURATES UR QL SCN: NOT DETECTED
BASOPHILS # BLD: 0.04 E9/L (ref 0–0.2)
BASOPHILS NFR BLD: 0.3 % (ref 0–2)
BENZODIAZ UR QL SCN: NOT DETECTED
BILIRUB SERPL-MCNC: 0.7 MG/DL (ref 0–1.2)
BILIRUB UR QL STRIP: NEGATIVE
BUN SERPL-MCNC: 14 MG/DL (ref 6–23)
BURR CELLS: ABNORMAL
CALCIUM SERPL-MCNC: 9 MG/DL (ref 8.6–10.2)
CANNABINOIDS UR QL SCN: POSITIVE
CHLORIDE SERPL-SCNC: 102 MMOL/L (ref 98–107)
CK SERPL-CCNC: 171 U/L (ref 20–180)
CLARITY UR: CLEAR
CO2 SERPL-SCNC: 24 MMOL/L (ref 22–29)
COCAINE UR QL SCN: POSITIVE
COLOR UR: YELLOW
CREAT SERPL-MCNC: 1.2 MG/DL (ref 0.5–1)
DRUG SCREEN COMMENT UR-IMP: ABNORMAL
EKG ATRIAL RATE: 75 BPM
EKG P AXIS: 80 DEGREES
EKG P-R INTERVAL: 160 MS
EKG Q-T INTERVAL: 412 MS
EKG QRS DURATION: 84 MS
EKG QTC CALCULATION (BAZETT): 460 MS
EKG R AXIS: 73 DEGREES
EKG T AXIS: 76 DEGREES
EKG VENTRICULAR RATE: 75 BPM
EOSINOPHIL # BLD: 0.04 E9/L (ref 0.05–0.5)
EOSINOPHIL NFR BLD: 0.3 % (ref 0–6)
ERYTHROCYTE [DISTWIDTH] IN BLOOD BY AUTOMATED COUNT: 14.6 FL (ref 11.5–15)
ETHANOLAMINE SERPL-MCNC: <10 MG/DL (ref 0–0.08)
FENTANYL SCREEN, URINE: NOT DETECTED
GLUCOSE SERPL-MCNC: 169 MG/DL (ref 74–99)
GLUCOSE UR STRIP-MCNC: NEGATIVE MG/DL
HCT VFR BLD AUTO: 32.1 % (ref 34–48)
HGB BLD-MCNC: 10.5 G/DL (ref 11.5–15.5)
HGB UR QL STRIP: NEGATIVE
IMM GRANULOCYTES # BLD: 0.09 E9/L
IMM GRANULOCYTES NFR BLD: 0.6 % (ref 0–5)
KETONES UR STRIP-MCNC: NEGATIVE MG/DL
LEUKOCYTE ESTERASE UR QL STRIP: ABNORMAL
LYMPHOCYTES # BLD: 0.46 E9/L (ref 1.5–4)
LYMPHOCYTES NFR BLD: 3.2 % (ref 20–42)
MCH RBC QN AUTO: 31.7 PG (ref 26–35)
MCHC RBC AUTO-ENTMCNC: 32.7 % (ref 32–34.5)
MCV RBC AUTO: 97 FL (ref 80–99.9)
METHADONE UR QL SCN: POSITIVE
MONOCYTES # BLD: 0.61 E9/L (ref 0.1–0.95)
MONOCYTES NFR BLD: 4.3 % (ref 2–12)
NEUTROPHILS # BLD: 12.93 E9/L (ref 1.8–7.3)
NEUTS SEG NFR BLD: 91.3 % (ref 43–80)
NITRITE UR QL STRIP: NEGATIVE
OPIATES UR QL SCN: NOT DETECTED
OVALOCYTES: ABNORMAL
OXYCODONE URINE: NOT DETECTED
PCP UR QL SCN: NOT DETECTED
PH UR STRIP: 6 [PH] (ref 5–9)
PLATELET # BLD AUTO: 243 E9/L (ref 130–450)
PMV BLD AUTO: 11.7 FL (ref 7–12)
POIKILOCYTES: ABNORMAL
POTASSIUM SERPL-SCNC: 4.8 MMOL/L (ref 3.5–5)
PROT SERPL-MCNC: 6.7 G/DL (ref 6.4–8.3)
PROT UR STRIP-MCNC: ABNORMAL MG/DL
RBC # BLD AUTO: 3.31 E12/L (ref 3.5–5.5)
RBC #/AREA URNS HPF: ABNORMAL /HPF (ref 0–2)
SALICYLATES SERPL-MCNC: <0.3 MG/DL (ref 0–30)
SODIUM SERPL-SCNC: 139 MMOL/L (ref 132–146)
SP GR UR STRIP: 1.02 (ref 1–1.03)
TRICYCLIC ANTIDEPRESSANTS SCREEN SERUM: NEGATIVE NG/ML
UROBILINOGEN UR STRIP-ACNC: 0.2 E.U./DL
WBC # BLD: 14.2 E9/L (ref 4.5–11.5)
WBC #/AREA URNS HPF: ABNORMAL /HPF (ref 0–5)

## 2023-07-11 PROCEDURE — 80179 DRUG ASSAY SALICYLATE: CPT

## 2023-07-11 PROCEDURE — 99285 EMERGENCY DEPT VISIT HI MDM: CPT

## 2023-07-11 PROCEDURE — 81001 URINALYSIS AUTO W/SCOPE: CPT

## 2023-07-11 PROCEDURE — 87088 URINE BACTERIA CULTURE: CPT

## 2023-07-11 PROCEDURE — 93010 ELECTROCARDIOGRAM REPORT: CPT | Performed by: INTERNAL MEDICINE

## 2023-07-11 PROCEDURE — 80307 DRUG TEST PRSMV CHEM ANLYZR: CPT

## 2023-07-11 PROCEDURE — 85025 COMPLETE CBC W/AUTO DIFF WBC: CPT

## 2023-07-11 PROCEDURE — 93005 ELECTROCARDIOGRAM TRACING: CPT | Performed by: STUDENT IN AN ORGANIZED HEALTH CARE EDUCATION/TRAINING PROGRAM

## 2023-07-11 PROCEDURE — 2580000003 HC RX 258: Performed by: STUDENT IN AN ORGANIZED HEALTH CARE EDUCATION/TRAINING PROGRAM

## 2023-07-11 PROCEDURE — 96375 TX/PRO/DX INJ NEW DRUG ADDON: CPT

## 2023-07-11 PROCEDURE — 70450 CT HEAD/BRAIN W/O DYE: CPT

## 2023-07-11 PROCEDURE — 80053 COMPREHEN METABOLIC PANEL: CPT

## 2023-07-11 PROCEDURE — 82550 ASSAY OF CK (CPK): CPT

## 2023-07-11 PROCEDURE — 82077 ASSAY SPEC XCP UR&BREATH IA: CPT

## 2023-07-11 PROCEDURE — 71046 X-RAY EXAM CHEST 2 VIEWS: CPT

## 2023-07-11 PROCEDURE — 80143 DRUG ASSAY ACETAMINOPHEN: CPT

## 2023-07-11 PROCEDURE — 96374 THER/PROPH/DIAG INJ IV PUSH: CPT

## 2023-07-11 RX ORDER — 0.9 % SODIUM CHLORIDE 0.9 %
1000 INTRAVENOUS SOLUTION INTRAVENOUS ONCE
Status: COMPLETED | OUTPATIENT
Start: 2023-07-11 | End: 2023-07-12

## 2023-07-11 RX ADMIN — SODIUM CHLORIDE 1000 ML: 9 INJECTION, SOLUTION INTRAVENOUS at 17:01

## 2023-07-11 ASSESSMENT — LIFESTYLE VARIABLES
HOW OFTEN DO YOU HAVE A DRINK CONTAINING ALCOHOL: MONTHLY OR LESS
HOW MANY STANDARD DRINKS CONTAINING ALCOHOL DO YOU HAVE ON A TYPICAL DAY: 3 OR 4

## 2023-07-11 ASSESSMENT — PAIN - FUNCTIONAL ASSESSMENT: PAIN_FUNCTIONAL_ASSESSMENT: NONE - DENIES PAIN

## 2023-07-11 NOTE — ED PROVIDER NOTES
[address] [facility] ENCOUNTER        Pt Name: Nikhil Pak  MRN: [id number]  [address] [facility] [address] 1960  Date of evaluation: 2023  Provider: Beth Carter DO  PCP: CHICHO Munoz CNP  Note Started: 3:45 PM EDT 23    CHIEF COMPLAINT       Chief Complaint   Patient presents with    Drug Overdose     5mg of narcan given. Suicidal     SI without a plan. HISTORY OF PRESENT ILLNESS: 1 or more Elements   History From: patient    Limitations to history : Altered Mental Status and behavior    Nikhil Pak is a 66 y.o. female with a history of COPD, hypertension, schizophrenia, alcohol abuse who presents to the emergency department via EMS for a drug overdose and suicidal ideations. Patient will not state what she overdosed on. She states that she ate this morning and then went on the bus with her friend and does not remember what happened after that. She states that she does not want to be here anymore and she states that she feels suicidal.  When I asked her if she had a plan she said \"maybe\". However, she would not disclose the plan to me. She states that she has tried herself in the past by cutting. Patient denies any other acute symptoms or concerns at this time. Nursing Notes were all reviewed and agreed with or any disagreements were addressed in the HPI. REVIEW OF SYSTEMS :      Review of Systems    Positives and Pertinent negatives as per HPI.      SURGICAL HISTORY     Past Surgical History:   Procedure Laterality Date    APPENDECTOMY       SECTION      2 C SECTION    ECHO COMPLETE  1/10/2014         MS UNLISTED PROCEDURE DENTOALVEOLAR STRUCTURES N/A 2018    MULTIPLE DENTAL EXTRACTIONS AND ALVELOPLASTY performed by Mitchell Garrett DDS at [address] Right     WRIST SURGERY Right        CURRENTMEDICATIONS       Previous Medications    ALBUTEROL

## 2023-07-12 ENCOUNTER — APPOINTMENT (OUTPATIENT)
Dept: GENERAL RADIOLOGY | Age: 63
DRG: 817 | End: 2023-07-12
Payer: COMMERCIAL

## 2023-07-12 ENCOUNTER — APPOINTMENT (OUTPATIENT)
Dept: CT IMAGING | Age: 63
DRG: 817 | End: 2023-07-12
Payer: COMMERCIAL

## 2023-07-12 PROBLEM — K86.89 PANCREATIC MASS: Status: ACTIVE | Noted: 2023-07-12

## 2023-07-12 PROBLEM — R65.21 SEPTIC SHOCK (HCC): Status: ACTIVE | Noted: 2023-07-12

## 2023-07-12 PROBLEM — T40.3X2A: Status: ACTIVE | Noted: 2023-07-12

## 2023-07-12 PROBLEM — K76.9 LIVER LESION: Status: ACTIVE | Noted: 2023-07-12

## 2023-07-12 PROBLEM — J96.02 ACUTE HYPERCAPNIC RESPIRATORY FAILURE (HCC): Status: ACTIVE | Noted: 2023-07-12

## 2023-07-12 PROBLEM — R45.851 SUICIDAL IDEATION: Status: ACTIVE | Noted: 2023-07-12

## 2023-07-12 PROBLEM — D73.89 SPLENIC LESION: Status: ACTIVE | Noted: 2023-07-12

## 2023-07-12 PROBLEM — A41.9 SEPTIC SHOCK (HCC): Status: ACTIVE | Noted: 2023-07-12

## 2023-07-12 PROBLEM — K82.8 THICKENING OF WALL OF GALLBLADDER: Status: ACTIVE | Noted: 2023-07-12

## 2023-07-12 PROBLEM — I26.99 ACUTE PULMONARY EMBOLISM WITHOUT ACUTE COR PULMONALE (HCC): Status: ACTIVE | Noted: 2023-07-12

## 2023-07-12 PROBLEM — J96.00 ACUTE RESPIRATORY FAILURE (HCC): Status: ACTIVE | Noted: 2023-07-12

## 2023-07-12 PROBLEM — R91.8 PULMONARY NODULES/LESIONS, MULTIPLE: Status: ACTIVE | Noted: 2023-07-12

## 2023-07-12 LAB
ANION GAP SERPL CALCULATED.3IONS-SCNC: 14 MMOL/L (ref 7–16)
APTT BLD: 26.7 SEC (ref 24.5–35.1)
APTT BLD: 27.7 SEC (ref 24.5–35.1)
B.E.: -4.8 MMOL/L (ref -3–3)
B.E.: -6.2 MMOL/L (ref -3–3)
B.E.: -6.8 MMOL/L (ref -3–3)
B.E.: -8.7 MMOL/L (ref -3–3)
B.E.: -8.8 MMOL/L (ref -3–3)
BASOPHILS # BLD: 0.03 E9/L (ref 0–0.2)
BASOPHILS NFR BLD: 0.2 % (ref 0–2)
BUN SERPL-MCNC: 14 MG/DL (ref 6–23)
BURR CELLS: ABNORMAL
CALCIUM SERPL-MCNC: 8.8 MG/DL (ref 8.6–10.2)
CEA SERPL-MCNC: 7.6 NG/ML (ref 0–5.2)
CHLORIDE SERPL-SCNC: 100 MMOL/L (ref 98–107)
CO2 SERPL-SCNC: 22 MMOL/L (ref 22–29)
COHB: 0.2 % (ref 0–1.5)
COHB: 0.4 % (ref 0–1.5)
COHB: 0.5 % (ref 0–1.5)
COHB: 0.5 % (ref 0–1.5)
COHB: 0.6 % (ref 0–1.5)
CREAT SERPL-MCNC: 1.5 MG/DL (ref 0.5–1)
CRITICAL: ABNORMAL
DATE ANALYZED: ABNORMAL
DATE OF COLLECTION: ABNORMAL
EKG ATRIAL RATE: 94 BPM
EKG Q-T INTERVAL: 352 MS
EKG QRS DURATION: 74 MS
EKG QTC CALCULATION (BAZETT): 456 MS
EKG R AXIS: 79 DEGREES
EKG T AXIS: 74 DEGREES
EKG VENTRICULAR RATE: 101 BPM
EOSINOPHIL # BLD: 0.02 E9/L (ref 0.05–0.5)
EOSINOPHIL NFR BLD: 0.1 % (ref 0–6)
ERYTHROCYTE [DISTWIDTH] IN BLOOD BY AUTOMATED COUNT: 14.6 FL (ref 11.5–15)
ERYTHROCYTE [DISTWIDTH] IN BLOOD BY AUTOMATED COUNT: 14.7 FL (ref 11.5–15)
FIO2: 40 %
GLUCOSE SERPL-MCNC: 73 MG/DL (ref 74–99)
HCO3: 19.4 MMOL/L (ref 22–26)
HCO3: 20.8 MMOL/L (ref 22–26)
HCO3: 21.3 MMOL/L (ref 22–26)
HCO3: 22.5 MMOL/L (ref 22–26)
HCO3: 23.8 MMOL/L (ref 22–26)
HCT VFR BLD AUTO: 29.5 % (ref 34–48)
HCT VFR BLD AUTO: 33.2 % (ref 34–48)
HGB BLD-MCNC: 10.5 G/DL (ref 11.5–15.5)
HGB BLD-MCNC: 9.1 G/DL (ref 11.5–15.5)
HHB: 1.4 % (ref 0–5)
HHB: 2.9 % (ref 0–5)
HHB: 3.9 % (ref 0–5)
HHB: 5.5 % (ref 0–5)
HHB: 74.1 % (ref 0–5)
IMM GRANULOCYTES # BLD: 0.11 E9/L
IMM GRANULOCYTES NFR BLD: 0.7 % (ref 0–5)
LAB: ABNORMAL
LACTATE BLDV-SCNC: 1 MMOL/L (ref 0.5–2.2)
LACTATE BLDV-SCNC: 1.9 MMOL/L (ref 0.5–2.2)
LACTATE BLDV-SCNC: 5.3 MMOL/L (ref 0.5–2.2)
LIPASE: 18 U/L (ref 13–60)
LV EF: 60 %
LVEF MODALITY: NORMAL
LYMPHOCYTES # BLD: 0.5 E9/L (ref 1.5–4)
LYMPHOCYTES NFR BLD: 3 % (ref 20–42)
Lab: ABNORMAL
MCH RBC QN AUTO: 31.4 PG (ref 26–35)
MCH RBC QN AUTO: 31.5 PG (ref 26–35)
MCHC RBC AUTO-ENTMCNC: 30.8 % (ref 32–34.5)
MCHC RBC AUTO-ENTMCNC: 31.6 % (ref 32–34.5)
MCV RBC AUTO: 101.7 FL (ref 80–99.9)
MCV RBC AUTO: 99.7 FL (ref 80–99.9)
METER GLUCOSE: 251 MG/DL (ref 74–99)
METER GLUCOSE: 93 MG/DL (ref 74–99)
METHB: 0.3 % (ref 0–1.5)
METHB: 0.6 % (ref 0–1.5)
MODE: ABNORMAL
MONOCYTES # BLD: 0.81 E9/L (ref 0.1–0.95)
MONOCYTES NFR BLD: 4.8 % (ref 2–12)
NEUTROPHILS # BLD: 15.24 E9/L (ref 1.8–7.3)
NEUTS SEG NFR BLD: 91.2 % (ref 43–80)
O2 CONTENT: 13 ML/DL
O2 CONTENT: 14.2 ML/DL
O2 CONTENT: 14.3 ML/DL
O2 CONTENT: 14.7 ML/DL
O2 CONTENT: 3.5 ML/DL
O2 SATURATION: 25.1 % (ref 92–98.5)
O2 SATURATION: 94.5 % (ref 92–98.5)
O2 SATURATION: 96.1 % (ref 92–98.5)
O2 SATURATION: 97.1 % (ref 92–98.5)
O2 SATURATION: 98.6 % (ref 92–98.5)
O2HB: 24.8 % (ref 94–97)
O2HB: 93.6 % (ref 94–97)
O2HB: 95.3 % (ref 94–97)
O2HB: 96.6 % (ref 94–97)
O2HB: 97.9 % (ref 94–97)
OPERATOR ID: 420
OPERATOR ID: 7294
OPERATOR ID: 7294
OPERATOR ID: 8218
OPERATOR ID: 8218
OVALOCYTES: ABNORMAL
PATIENT TEMP: 37 C
PCO2: 43.5 MMHG (ref 35–45)
PCO2: 52.9 MMHG (ref 35–45)
PCO2: 64.5 MMHG (ref 35–45)
PCO2: 66 MMHG (ref 35–45)
PCO2: 75.6 MMHG (ref 35–45)
PEEP/CPAP: 5 CMH2O
PEEP/CPAP: 5 CMH2O
PEEP/CPAP: 6 CMH2O
PFO2: 2.42 MMHG/%
PFO2: 2.71 MMHG/%
PFO2: 3.18 MMHG/%
PH BLOOD GAS: 7.12 (ref 7.35–7.45)
PH BLOOD GAS: 7.13 (ref 7.35–7.45)
PH BLOOD GAS: 7.15 (ref 7.35–7.45)
PH BLOOD GAS: 7.18 (ref 7.35–7.45)
PH BLOOD GAS: 7.31 (ref 7.35–7.45)
PLATELET # BLD AUTO: 233 E9/L (ref 130–450)
PLATELET # BLD AUTO: 269 E9/L (ref 130–450)
PMV BLD AUTO: 11.2 FL (ref 7–12)
PMV BLD AUTO: 11.4 FL (ref 7–12)
PO2: 108.4 MMHG (ref 75–100)
PO2: 127.2 MMHG (ref 75–100)
PO2: 20.2 MMHG (ref 75–100)
PO2: 83.2 MMHG (ref 75–100)
PO2: 96.8 MMHG (ref 75–100)
POIKILOCYTES: ABNORMAL
POTASSIUM SERPL-SCNC: 4.2 MMOL/L (ref 3.5–5)
PS: 14 CMH20
RBC # BLD AUTO: 2.9 E12/L (ref 3.5–5.5)
RBC # BLD AUTO: 3.33 E12/L (ref 3.5–5.5)
RI(T): 0.85
RI(T): 0.93
RI(T): 1.18
RR MECHANICAL: 24 B/MIN
RR MECHANICAL: 30 B/MIN
SODIUM SERPL-SCNC: 136 MMOL/L (ref 132–146)
SOURCE, BLOOD GAS: ABNORMAL
THB: 10 G/DL (ref 11.5–16.5)
THB: 10.2 G/DL (ref 11.5–16.5)
THB: 10.3 G/DL (ref 11.5–16.5)
THB: 10.9 G/DL (ref 11.5–16.5)
THB: 9.8 G/DL (ref 11.5–16.5)
TIME ANALYZED: 1500
TIME ANALYZED: 1652
TIME ANALYZED: 1805
TIME ANALYZED: 915
TIME ANALYZED: 952
TROPONIN, HIGH SENSITIVITY: 45 NG/L (ref 0–9)
TROPONIN, HIGH SENSITIVITY: 54 NG/L (ref 0–9)
VT MECHANICAL: 475 ML
VT MECHANICAL: 500 ML
WBC # BLD: 14.2 E9/L (ref 4.5–11.5)
WBC # BLD: 16.7 E9/L (ref 4.5–11.5)

## 2023-07-12 PROCEDURE — 74177 CT ABD & PELVIS W/CONTRAST: CPT

## 2023-07-12 PROCEDURE — 71045 X-RAY EXAM CHEST 1 VIEW: CPT

## 2023-07-12 PROCEDURE — 85730 THROMBOPLASTIN TIME PARTIAL: CPT

## 2023-07-12 PROCEDURE — 36556 INSERT NON-TUNNEL CV CATH: CPT

## 2023-07-12 PROCEDURE — 6360000004 HC RX CONTRAST MEDICATION: Performed by: RADIOLOGY

## 2023-07-12 PROCEDURE — 2500000003 HC RX 250 WO HCPCS: Performed by: EMERGENCY MEDICINE

## 2023-07-12 PROCEDURE — 6360000002 HC RX W HCPCS

## 2023-07-12 PROCEDURE — 82805 BLOOD GASES W/O2 SATURATION: CPT

## 2023-07-12 PROCEDURE — 85025 COMPLETE CBC W/AUTO DIFF WBC: CPT

## 2023-07-12 PROCEDURE — 6360000002 HC RX W HCPCS: Performed by: EMERGENCY MEDICINE

## 2023-07-12 PROCEDURE — 93306 TTE W/DOPPLER COMPLETE: CPT

## 2023-07-12 PROCEDURE — 36415 COLL VENOUS BLD VENIPUNCTURE: CPT

## 2023-07-12 PROCEDURE — 6370000000 HC RX 637 (ALT 250 FOR IP)

## 2023-07-12 PROCEDURE — 84484 ASSAY OF TROPONIN QUANT: CPT

## 2023-07-12 PROCEDURE — 36620 INSERTION CATHETER ARTERY: CPT

## 2023-07-12 PROCEDURE — 83605 ASSAY OF LACTIC ACID: CPT

## 2023-07-12 PROCEDURE — 86316 IMMUNOASSAY TUMOR OTHER: CPT

## 2023-07-12 PROCEDURE — 2580000003 HC RX 258: Performed by: NURSE PRACTITIONER

## 2023-07-12 PROCEDURE — 2580000003 HC RX 258: Performed by: EMERGENCY MEDICINE

## 2023-07-12 PROCEDURE — 86301 IMMUNOASSAY TUMOR CA 19-9: CPT

## 2023-07-12 PROCEDURE — 83690 ASSAY OF LIPASE: CPT

## 2023-07-12 PROCEDURE — 93005 ELECTROCARDIOGRAM TRACING: CPT | Performed by: EMERGENCY MEDICINE

## 2023-07-12 PROCEDURE — 93010 ELECTROCARDIOGRAM REPORT: CPT | Performed by: INTERNAL MEDICINE

## 2023-07-12 PROCEDURE — 71275 CT ANGIOGRAPHY CHEST: CPT

## 2023-07-12 PROCEDURE — 2000000000 HC ICU R&B

## 2023-07-12 PROCEDURE — 99223 1ST HOSP IP/OBS HIGH 75: CPT | Performed by: CLINICAL NURSE SPECIALIST

## 2023-07-12 PROCEDURE — 99222 1ST HOSP IP/OBS MODERATE 55: CPT | Performed by: FAMILY MEDICINE

## 2023-07-12 PROCEDURE — 85027 COMPLETE CBC AUTOMATED: CPT

## 2023-07-12 PROCEDURE — 87081 CULTURE SCREEN ONLY: CPT

## 2023-07-12 PROCEDURE — 82378 CARCINOEMBRYONIC ANTIGEN: CPT

## 2023-07-12 PROCEDURE — 36600 WITHDRAWAL OF ARTERIAL BLOOD: CPT

## 2023-07-12 PROCEDURE — 80048 BASIC METABOLIC PNL TOTAL CA: CPT

## 2023-07-12 PROCEDURE — 37799 UNLISTED PX VASCULAR SURGERY: CPT

## 2023-07-12 PROCEDURE — 82962 GLUCOSE BLOOD TEST: CPT

## 2023-07-12 PROCEDURE — C1751 CATH, INF, PER/CENT/MIDLINE: HCPCS

## 2023-07-12 RX ORDER — ACETAMINOPHEN 500 MG
1000 TABLET ORAL ONCE
Status: COMPLETED | OUTPATIENT
Start: 2023-07-12 | End: 2023-07-12

## 2023-07-12 RX ORDER — HEPARIN SODIUM 1000 [USP'U]/ML
80 INJECTION, SOLUTION INTRAVENOUS; SUBCUTANEOUS PRN
Status: DISCONTINUED | OUTPATIENT
Start: 2023-07-12 | End: 2023-07-14 | Stop reason: ALTCHOICE

## 2023-07-12 RX ORDER — HEPARIN SODIUM 1000 [USP'U]/ML
40 INJECTION, SOLUTION INTRAVENOUS; SUBCUTANEOUS PRN
Status: DISCONTINUED | OUTPATIENT
Start: 2023-07-12 | End: 2023-07-14 | Stop reason: ALTCHOICE

## 2023-07-12 RX ORDER — 0.9 % SODIUM CHLORIDE 0.9 %
30 INTRAVENOUS SOLUTION INTRAVENOUS ONCE
Status: COMPLETED | OUTPATIENT
Start: 2023-07-12 | End: 2023-07-12

## 2023-07-12 RX ORDER — ONDANSETRON 2 MG/ML
INJECTION INTRAMUSCULAR; INTRAVENOUS
Status: COMPLETED
Start: 2023-07-12 | End: 2023-07-12

## 2023-07-12 RX ORDER — NALOXONE HYDROCHLORIDE 0.4 MG/ML
INJECTION, SOLUTION INTRAMUSCULAR; INTRAVENOUS; SUBCUTANEOUS
Status: COMPLETED
Start: 2023-07-12 | End: 2023-07-12

## 2023-07-12 RX ORDER — NALOXONE HYDROCHLORIDE 0.4 MG/ML
0.4 INJECTION, SOLUTION INTRAMUSCULAR; INTRAVENOUS; SUBCUTANEOUS ONCE
Status: COMPLETED | OUTPATIENT
Start: 2023-07-12 | End: 2023-07-12

## 2023-07-12 RX ORDER — 0.9 % SODIUM CHLORIDE 0.9 %
1000 INTRAVENOUS SOLUTION INTRAVENOUS ONCE
Status: COMPLETED | OUTPATIENT
Start: 2023-07-12 | End: 2023-07-12

## 2023-07-12 RX ORDER — MULTIVITAMIN WITH IRON
1 TABLET ORAL DAILY
Status: DISCONTINUED | OUTPATIENT
Start: 2023-07-12 | End: 2023-07-19 | Stop reason: HOSPADM

## 2023-07-12 RX ORDER — SODIUM CHLORIDE 0.9 % (FLUSH) 0.9 %
5-40 SYRINGE (ML) INJECTION PRN
Status: DISCONTINUED | OUTPATIENT
Start: 2023-07-12 | End: 2023-07-19 | Stop reason: HOSPADM

## 2023-07-12 RX ORDER — HEPARIN SODIUM 10000 [USP'U]/100ML
5-30 INJECTION, SOLUTION INTRAVENOUS CONTINUOUS
Status: DISCONTINUED | OUTPATIENT
Start: 2023-07-12 | End: 2023-07-14

## 2023-07-12 RX ORDER — HEPARIN SODIUM 1000 [USP'U]/ML
80 INJECTION, SOLUTION INTRAVENOUS; SUBCUTANEOUS ONCE
Status: COMPLETED | OUTPATIENT
Start: 2023-07-12 | End: 2023-07-12

## 2023-07-12 RX ORDER — FOLIC ACID 1 MG/1
1 TABLET ORAL DAILY
Status: DISCONTINUED | OUTPATIENT
Start: 2023-07-12 | End: 2023-07-19 | Stop reason: HOSPADM

## 2023-07-12 RX ORDER — ONDANSETRON 2 MG/ML
4 INJECTION INTRAMUSCULAR; INTRAVENOUS ONCE
Status: COMPLETED | OUTPATIENT
Start: 2023-07-12 | End: 2023-07-12

## 2023-07-12 RX ORDER — SODIUM CHLORIDE 0.9 % (FLUSH) 0.9 %
5-40 SYRINGE (ML) INJECTION 2 TIMES DAILY
Status: DISCONTINUED | OUTPATIENT
Start: 2023-07-12 | End: 2023-07-19 | Stop reason: HOSPADM

## 2023-07-12 RX ADMIN — SODIUM BICARBONATE: 84 INJECTION, SOLUTION INTRAVENOUS at 11:14

## 2023-07-12 RX ADMIN — ONDANSETRON 4 MG: 2 INJECTION INTRAMUSCULAR; INTRAVENOUS at 09:49

## 2023-07-12 RX ADMIN — NOREPINEPHRINE BITARTRATE 5 MCG/MIN: 1 INJECTION, SOLUTION, CONCENTRATE INTRAVENOUS at 10:37

## 2023-07-12 RX ADMIN — IOPAMIDOL 75 ML: 755 INJECTION, SOLUTION INTRAVENOUS at 09:34

## 2023-07-12 RX ADMIN — HEPARIN SODIUM 4540 UNITS: 1000 INJECTION INTRAVENOUS; SUBCUTANEOUS at 11:10

## 2023-07-12 RX ADMIN — SODIUM CHLORIDE 1000 ML: 9 INJECTION, SOLUTION INTRAVENOUS at 11:09

## 2023-07-12 RX ADMIN — SODIUM BICARBONATE: 84 INJECTION, SOLUTION INTRAVENOUS at 22:58

## 2023-07-12 RX ADMIN — SODIUM CHLORIDE 1701 ML: 9 INJECTION, SOLUTION INTRAVENOUS at 08:17

## 2023-07-12 RX ADMIN — CEFEPIME 2000 MG: 2 INJECTION, POWDER, FOR SOLUTION INTRAVENOUS at 11:25

## 2023-07-12 RX ADMIN — NALOXONE HYDROCHLORIDE 0.4 MG: 0.4 INJECTION, SOLUTION INTRAMUSCULAR; INTRAVENOUS; SUBCUTANEOUS at 17:07

## 2023-07-12 RX ADMIN — MULTIVITAMIN TABLET 1 TABLET: TABLET at 20:38

## 2023-07-12 RX ADMIN — FOLIC ACID 1 MG: 1 TABLET ORAL at 18:51

## 2023-07-12 RX ADMIN — SODIUM CHLORIDE, PRESERVATIVE FREE 10 ML: 5 INJECTION INTRAVENOUS at 20:38

## 2023-07-12 RX ADMIN — HEPARIN SODIUM 18 UNITS/KG/HR: 10000 INJECTION, SOLUTION INTRAVENOUS at 11:14

## 2023-07-12 RX ADMIN — ACETAMINOPHEN 1000 MG: 500 TABLET ORAL at 18:51

## 2023-07-12 ASSESSMENT — ENCOUNTER SYMPTOMS
ABDOMINAL PAIN: 1
CHEST TIGHTNESS: 1

## 2023-07-12 ASSESSMENT — PAIN SCALES - GENERAL
PAINLEVEL_OUTOF10: 0
PAINLEVEL_OUTOF10: 0

## 2023-07-12 NOTE — ED PROVIDER NOTES
7/12/23  11:02 AM EDT      Patient presently admitted and boarded in the department pending bed availability. Intervention required by emergency physician. Interval HPI: Patient was seen in emergency department yesterday for drug overdose. Allegedly took cocaine and opiate. Patient was given 5 mg of Narcan. Patient was medically cleared and awaiting [facility] assessment this morning when she became hypotensive complaining of shortness of breath. Patient appears cachectic she has history of alcohol abuse. Interval physical exam: Cachectic Mild tachypnea     Constitutional/General: Alert and oriented x3 Moderate distress. Head: Normocephalic and atraumatic  Eyes: PERRL, EOMI, sclera non icteric  Mouth: Oropharynx clear, handling secretions, MM dry   Neck: Supple, full ROM, no stridor, no meningeal signs  Respiratory: Lungs clear to auscultation bilaterally,Not in respiratory distress    Cardiovascular:  Regular rate. Regular rhythm. 2+ distal pulses. Equal extremity pulses. GI:  Abdomen Soft, Non tender, Non distended. No rebound, guarding, or rigidity. Musculoskeletal: Moves all extremities x 4. Warm and well perfused,  Capillary refill <3 seconds  Integument: skin cool and dry. No rashes.    Neurologic: Glascow Coma Scale  Best Eye Response 4 - Opens eyes on own   Best Verbal Response 5 - Alert and oriented   Best Motor Response 6 - Follows simple motor commands   Total 15         Medications   norepinephrine (LEVOPHED) 16 mg in sodium chloride 0.9 % 250 mL infusion (5 mcg/min IntraVENous New Bag 7/12/23 1037)   sodium bicarbonate 150 mEq in dextrose 5 % 1,000 mL infusion (has no administration in time range)   ceFEPIme (MAXIPIME) 2,000 mg in sodium chloride 0.9 % 50 mL IVPB (has no administration in time range)   perflutren lipid microspheres (DEFINITY) injection 1.5 mL (has no administration in time range)   heparin (porcine) injection 4,540 Units (has no administration in time range)

## 2023-07-12 NOTE — PROGRESS NOTES
Call placed to [facility] to update on admission to ICU and on patient condition/plan of care. No answer at this time. Voice message left to return call to ICU at [phone].

## 2023-07-12 NOTE — ED NOTES
This RN has not received call back from patient's nurse but noted at this time that IV fluids are now documented as given start time now showing 1701, no stop time shown at this time. Unknown if IV fluids were completed. Please document updated vitals when IV fluids are completed.         Sirisha Miles RN  07/12/23 8000

## 2023-07-12 NOTE — ED NOTES
Slept throughout the night. Awakened with ease. Taking po. No complaints or SI/HI.      Haja Saravia RN  07/12/23 2557

## 2023-07-12 NOTE — ED NOTES
Call to the pt's RN and requested a urine drug screen and current vitals.        Feng Angel, [facility]  07/11/23 4944

## 2023-07-12 NOTE — ED NOTES
This RN previously received call from AMADOU WEINER [facility] ED stating patient was medically cleared. While reviewing chart noted ED physician's note stated \"creatinine was slightly elevated at 1.2 but patient was treated with IV fluids\". According to [facility] pateint has not yet received IV fluids that were due at 1645. [facility] ED to ask nurse if patient received IV fluids and was told nurse is with a patient and will call back.       Tanner Posada RN  07/12/23 9835

## 2023-07-12 NOTE — PATIENT CARE CONFERENCE
Intensive Care Daily Quality Rounding Checklist      ICU Team Members: Dr. Brooke Watson, Dr. Austen Lara (resident), charge nurse, bedside nurse, respiratory therapist    ICU Day #: NUMBER: 1    Intubation Date: N/A    Ventilator Day #: N/A    Central Line Insertion Date: July 12        Day #: NUMBER: 1        Indication: CVCIndication: Administration of vasopressors or vesicant medications     Arterial Line Insertion Date: N/A      Day #:     Temporary Hemodialysis Catheter Insertion Date: N/A      Day # N/A    DVT Prophylaxis: Heparin drip    GI Prophylaxis: none    Rodriguez Catheter Insertion Date: July 12       Day #: 1      Indications: Need for fluid management of the critically ill patient in a critical care setting      Continued need (if yes, reason documented and discussed with physician): yes, on vasopressors    Skin Issues/ Wounds and ordered treatment discussed on rounds: SOS precautions    Goals/ Plans for the Day: Daily labs, wean off Bipap, remove CVC which is misplaced and put in new one, wean Levophed as able, sitter at bedside

## 2023-07-12 NOTE — ED NOTES
TAWNYA BURGER is attempted to reach [facility] ED to assess this patient. [phone] keeps going to voice mail [employer] and does not allow you to leave a message and then hangs up on you. If you are seeing this message please call [facility] at [phone]. TAWNYA BURGER will continue trying .      OLIVER Stephens, [place]  07/12/23 9774

## 2023-07-12 NOTE — ED NOTES
Behavioral Health Crisis Assessment    Telehealth consent signed by patient and received. Behavioral Health Crisis Assessment completed via telehealth Ipad. Chief Complaint:     Mental Status Exam:     Legal Status:  [] Voluntary:  [x] Involuntary, Issued by: ED Physician    Gender:  [] Male [x] Female [] Transgender  [] Other    Sexual Orientation:  [] Heterosexual [] Homosexual [] Bisexual [] Other    Brief Clinical Summary:    Collateral Information:    Risk Factors:    Protective Factors:    Suicidal Ideations:  [] Reports:    [] Past [] Present   [] Denies    Suicide Attempts:  [] Reports:   [] Denies    C-SSRS Screening Completed by RN: Current Suicide Risk:  [] No Risk [] Low [] Moderate [] High    Homicidal Ideations:  [] Reports:   [] Past [] Present   [] Denies     Self Injurious/Self Mutilation Behaviors:  [] Reports:    [] Past [] Present   [] Denies    Hallucinations/Delusions:  [] Reports:   [] Denies     Substance Use/Alcohol Use/Addiction:  [] Reports:   [] Denies   [] SBIRT Screen Complete. Current or Past Substance Abuse Treatment:  [] Yes, When and Where:  [] No    Current or Past Mental Health Treatment:  [] Yes, When and Where:  [] No    Legal Issues:  []  Yes (Specify)  []  No    Access to Weapons:  []  Yes (Specify)  []  No    Trauma History:  [] Reports:  [] Denies     Living Situation:    Employment:    Education Level:    Violence Risk Screening:        Have you ever thought about hurting someone? []  No  []  Yes (Ask the questions listed below)   When? Did you follow through with the thoughts? [] No     [] Yes- When and what happened? 2.  Have you ever threatened anyone? []  No  []  Yes (Ask the questions listed below)   When and what happened? Have you ever threatened someone with a gun, knife or other weapon? []  No  []  Yes - When and what happened? 2. Have you ever had an order of protection taken out against you? []  Yes []  No  3.  Have you ever been

## 2023-07-12 NOTE — ED NOTES
TAWNYA BURGER received call for patients RN, TAWNYA BURGER informed RN to go ahead and set up telehealth for assessment and this SW would connect shortly. TAWNYA BURGER attempted to connect about 10 mins later and [facility] telehealth was not turned on. TAWNYA called [phone] and informed that telehealth needs turned on. TAWNYA BURGER waited and additional 10 mins. And telehealth was not turned on in that time period. TAO called MIGUEL ÁNGEL again and was informed ED Physician was putting in [address] and patient was not in a position to be assessed at this time.     TAWNYA BURGER is requesting a call when either patient is ready or patient is determined to go medical.    Thank you,    OLIVER Alonzo, [facility]  07/12/23 2788

## 2023-07-12 NOTE — PROGRESS NOTES
Date: 7/12/2023    Time: 10:53 AM    Patient Placed On BIPAP/CPAP/ Non-Invasive Ventilation? Yes    If no must comment. Facial area red/color change? No     BIPAP/CPAP skin barrier?   Yes    Skin barrier type:mepilexlite       Comments:        Kevin Lloyd RCP        07/12/23 1052   NIV Type   Suction Setup and Functional Yes   NIV Started/Stopped On   Equipment Type V60   Mode Bilevel   Mask Type Full face mask   Mask Size Medium   Assessment   Pulse 97   Respirations 17   SpO2 99 %   Level of Consciousness 0   Comfort Level Good   Using Accessory Muscles No   Mask Compliance Good   Skin Assessment Clean, dry, & intact   Skin Protection for O2 Device Yes   Orientation Middle   Location Nose   Intervention(s) Skin Barrier   Settings/Measurements   PIP Observed 14 cm H20   IPAP 14 cmH20   CPAP/EPAP 6 cmH2O   Vt (Measured) 495 mL   Rate Ordered 16   Insp Rise Time (%) 2 %   FiO2  50 %   I Time/ I Time % 0.85 s   Minute Volume (L/min) 9.1 Liters   Mask Leak (lpm) 32 lpm

## 2023-07-12 NOTE — ED NOTES
TAWNYA BURGER called and spoke with Summer RN at [facility] and requested signed consent form be faxed to [facility] in order for assessment to be completed. Also informed this patient will be the first one to be assessed this morning.      OLIVER Regalado, [place]  07/12/23 2052

## 2023-07-12 NOTE — ED NOTES
Per Dr. Vandana Recio patient is to be presented after  evaluates patient.       Colonel Heidi RN  07/12/23 3163

## 2023-07-12 NOTE — ED NOTES
Patient assessment completed. Found patient unresponsive. Patient bagged and medicated per MD verbal ordered 0.4 mg/ml Narcan. Patient became responsive. Remains awake. Sitter at bedside. VS stable.      Cecilia Washington RN  07/12/23 2001

## 2023-07-13 ENCOUNTER — APPOINTMENT (OUTPATIENT)
Dept: GENERAL RADIOLOGY | Age: 63
DRG: 817 | End: 2023-07-13
Payer: COMMERCIAL

## 2023-07-13 PROBLEM — F11.921 OPIOID INTOXICATION DELIRIUM (HCC): Status: ACTIVE | Noted: 2023-07-13

## 2023-07-13 LAB
ALBUMIN SERPL-MCNC: 3 G/DL (ref 3.5–5.2)
ALP SERPL-CCNC: 239 U/L (ref 35–104)
ALT SERPL-CCNC: 35 U/L (ref 0–32)
ANION GAP SERPL CALCULATED.3IONS-SCNC: 7 MMOL/L (ref 7–16)
APTT BLD: 56.1 SEC (ref 24.5–35.1)
APTT BLD: 60.8 SEC (ref 24.5–35.1)
AST SERPL-CCNC: 70 U/L (ref 0–31)
B.E.: 5.5 MMOL/L (ref -3–3)
BASOPHILS # BLD: 0.03 E9/L (ref 0–0.2)
BASOPHILS NFR BLD: 0.4 % (ref 0–2)
BILIRUB SERPL-MCNC: 0.5 MG/DL (ref 0–1.2)
BUN SERPL-MCNC: 16 MG/DL (ref 6–23)
CALCIUM SERPL-MCNC: 7.8 MG/DL (ref 8.6–10.2)
CHLORIDE SERPL-SCNC: 98 MMOL/L (ref 98–107)
CO2 SERPL-SCNC: 28 MMOL/L (ref 22–29)
COHB: 0.5 % (ref 0–1.5)
CREAT SERPL-MCNC: 1.3 MG/DL (ref 0.5–1)
CRITICAL: ABNORMAL
DATE ANALYZED: ABNORMAL
DATE OF COLLECTION: ABNORMAL
EOSINOPHIL # BLD: 0.06 E9/L (ref 0.05–0.5)
EOSINOPHIL NFR BLD: 0.8 % (ref 0–6)
ERYTHROCYTE [DISTWIDTH] IN BLOOD BY AUTOMATED COUNT: 14.6 FL (ref 11.5–15)
FERRITIN SERPL-MCNC: 1194 NG/ML
GLUCOSE SERPL-MCNC: 98 MG/DL (ref 74–99)
HCO3: 32.5 MMOL/L (ref 22–26)
HCT VFR BLD AUTO: 25.4 % (ref 34–48)
HGB BLD-MCNC: 8.1 G/DL (ref 11.5–15.5)
HHB: 1.4 % (ref 0–5)
IMM GRANULOCYTES # BLD: 0.04 E9/L
IMM GRANULOCYTES NFR BLD: 0.5 % (ref 0–5)
IRON SATN MFR SERPL: 8 % (ref 15–50)
IRON SERPL-MCNC: 16 MCG/DL (ref 37–145)
LAB: ABNORMAL
LYMPHOCYTES # BLD: 1.2 E9/L (ref 1.5–4)
LYMPHOCYTES NFR BLD: 15.2 % (ref 20–42)
Lab: ABNORMAL
MAGNESIUM SERPL-MCNC: 1.7 MG/DL (ref 1.6–2.6)
MAGNESIUM SERPL-MCNC: 2.1 MG/DL (ref 1.6–2.6)
MCH RBC QN AUTO: 31 PG (ref 26–35)
MCHC RBC AUTO-ENTMCNC: 31.9 % (ref 32–34.5)
MCV RBC AUTO: 97.3 FL (ref 80–99.9)
METHB: 0.3 % (ref 0–1.5)
MODE: ABNORMAL
MONOCYTES # BLD: 0.65 E9/L (ref 0.1–0.95)
MONOCYTES NFR BLD: 8.2 % (ref 2–12)
NEUTROPHILS # BLD: 5.92 E9/L (ref 1.8–7.3)
NEUTS SEG NFR BLD: 74.9 % (ref 43–80)
O2 CONTENT: 12.8 ML/DL
O2 SATURATION: 98.6 % (ref 92–98.5)
O2HB: 97.8 % (ref 94–97)
OPERATOR ID: 913
PATIENT TEMP: 37 C
PCO2: 62.9 MMHG (ref 35–45)
PH BLOOD GAS: 7.33 (ref 7.35–7.45)
PHOSPHATE SERPL-MCNC: 4.1 MG/DL (ref 2.5–4.5)
PLATELET # BLD AUTO: 183 E9/L (ref 130–450)
PMV BLD AUTO: 11 FL (ref 7–12)
PO2: 130.4 MMHG (ref 75–100)
POTASSIUM SERPL-SCNC: 4.1 MMOL/L (ref 3.5–5)
PROT SERPL-MCNC: 5.4 G/DL (ref 6.4–8.3)
RBC # BLD AUTO: 2.61 E12/L (ref 3.5–5.5)
SODIUM SERPL-SCNC: 133 MMOL/L (ref 132–146)
SOURCE, BLOOD GAS: ABNORMAL
THB: 9.1 G/DL (ref 11.5–16.5)
TIBC SERPL-MCNC: 193 MCG/DL (ref 250–450)
TIME ANALYZED: 852
WBC # BLD: 7.9 E9/L (ref 4.5–11.5)

## 2023-07-13 PROCEDURE — 36415 COLL VENOUS BLD VENIPUNCTURE: CPT

## 2023-07-13 PROCEDURE — 6360000002 HC RX W HCPCS: Performed by: EMERGENCY MEDICINE

## 2023-07-13 PROCEDURE — 37799 UNLISTED PX VASCULAR SURGERY: CPT

## 2023-07-13 PROCEDURE — 82805 BLOOD GASES W/O2 SATURATION: CPT

## 2023-07-13 PROCEDURE — 2580000003 HC RX 258: Performed by: NURSE PRACTITIONER

## 2023-07-13 PROCEDURE — 6360000002 HC RX W HCPCS: Performed by: INTERNAL MEDICINE

## 2023-07-13 PROCEDURE — 84100 ASSAY OF PHOSPHORUS: CPT

## 2023-07-13 PROCEDURE — 71045 X-RAY EXAM CHEST 1 VIEW: CPT

## 2023-07-13 PROCEDURE — 83735 ASSAY OF MAGNESIUM: CPT

## 2023-07-13 PROCEDURE — 85730 THROMBOPLASTIN TIME PARTIAL: CPT

## 2023-07-13 PROCEDURE — 99232 SBSQ HOSP IP/OBS MODERATE 35: CPT | Performed by: CLINICAL NURSE SPECIALIST

## 2023-07-13 PROCEDURE — 83540 ASSAY OF IRON: CPT

## 2023-07-13 PROCEDURE — 2000000000 HC ICU R&B

## 2023-07-13 PROCEDURE — 82728 ASSAY OF FERRITIN: CPT

## 2023-07-13 PROCEDURE — 85025 COMPLETE CBC W/AUTO DIFF WBC: CPT

## 2023-07-13 PROCEDURE — 6370000000 HC RX 637 (ALT 250 FOR IP)

## 2023-07-13 PROCEDURE — 94660 CPAP INITIATION&MGMT: CPT

## 2023-07-13 PROCEDURE — 99233 SBSQ HOSP IP/OBS HIGH 50: CPT | Performed by: INTERNAL MEDICINE

## 2023-07-13 PROCEDURE — 80053 COMPREHEN METABOLIC PANEL: CPT

## 2023-07-13 PROCEDURE — 83550 IRON BINDING TEST: CPT

## 2023-07-13 PROCEDURE — 6360000002 HC RX W HCPCS: Performed by: NURSE PRACTITIONER

## 2023-07-13 RX ORDER — DIMETHICONE, OXYBENZONE, AND PADIMATE O 2; 2.5; 6.6 G/100G; G/100G; G/100G
STICK TOPICAL PRN
Status: DISCONTINUED | OUTPATIENT
Start: 2023-07-13 | End: 2023-07-19 | Stop reason: HOSPADM

## 2023-07-13 RX ORDER — NALOXONE HYDROCHLORIDE 0.4 MG/ML
0.4 INJECTION, SOLUTION INTRAMUSCULAR; INTRAVENOUS; SUBCUTANEOUS PRN
Status: DISCONTINUED | OUTPATIENT
Start: 2023-07-13 | End: 2023-07-19 | Stop reason: HOSPADM

## 2023-07-13 RX ORDER — ACETAMINOPHEN 325 MG/1
650 TABLET ORAL EVERY 6 HOURS PRN
Status: DISCONTINUED | OUTPATIENT
Start: 2023-07-13 | End: 2023-07-19 | Stop reason: HOSPADM

## 2023-07-13 RX ORDER — MAGNESIUM SULFATE IN WATER 40 MG/ML
2000 INJECTION, SOLUTION INTRAVENOUS ONCE
Status: COMPLETED | OUTPATIENT
Start: 2023-07-13 | End: 2023-07-13

## 2023-07-13 RX ADMIN — NALOXONE HYDROCHLORIDE 0.4 MG: 0.4 INJECTION, SOLUTION INTRAMUSCULAR; INTRAVENOUS; SUBCUTANEOUS at 10:15

## 2023-07-13 RX ADMIN — MAGNESIUM SULFATE HEPTAHYDRATE 2000 MG: 40 INJECTION, SOLUTION INTRAVENOUS at 06:56

## 2023-07-13 RX ADMIN — SODIUM CHLORIDE, PRESERVATIVE FREE 10 ML: 5 INJECTION INTRAVENOUS at 19:45

## 2023-07-13 RX ADMIN — FOLIC ACID 1 MG: 1 TABLET ORAL at 08:56

## 2023-07-13 RX ADMIN — HEPARIN SODIUM 18 UNITS/KG/HR: 10000 INJECTION, SOLUTION INTRAVENOUS at 17:42

## 2023-07-13 RX ADMIN — MULTIVITAMIN TABLET 1 TABLET: TABLET at 08:56

## 2023-07-13 RX ADMIN — SODIUM CHLORIDE, PRESERVATIVE FREE 10 ML: 5 INJECTION INTRAVENOUS at 08:56

## 2023-07-13 ASSESSMENT — PAIN SCALES - GENERAL
PAINLEVEL_OUTOF10: 0
PAINLEVEL_OUTOF10: 0
PAINLEVEL_OUTOF10: 8

## 2023-07-13 ASSESSMENT — PAIN DESCRIPTION - LOCATION: LOCATION: GENERALIZED

## 2023-07-13 NOTE — PROGRESS NOTES
Intensive Care Daily Quality Rounding Checklist      ICU Team Members: Bedside Nurse, , , and Nursing Unit Leadership    ICU Day #: NUMBER: 2    Intubation Date:  N/A    Ventilator Day #: N/A    Central Line Insertion Date: July 12        Day #: NUMBER: 2        Indication: CVCIndication: Hemodynamically unstable requiring volume resuscitation     Arterial Line Insertion Date: July 12      Day #: NUMBER: 2    Temporary Hemodialysis Catheter Insertion Date:  N/A      Day # N/A    DVT Prophylaxis: Heparin gtt     GI Prophylaxis: N/A    Rodriguez Catheter Insertion Date: July 12       Day #: 2      Indications: Need for fluid management of the critically ill patient in a critical care setting      Continued need (if yes, reason documented and discussed with physician): yes, patient on pressor    Skin Issues/ Wounds and ordered treatment discussed on rounds: SOS precautions     Goals/ Plans for the Day:  Monitor labs and vitals, transfuse as needed. Improve mentation with reorientation, monitor oxygenation and temperature. Continue critical care management.  Stop bicarb gtt and monitor on AVAPS, repeat dose of NARCAN,

## 2023-07-13 NOTE — PLAN OF CARE
Problem: Discharge Planning  Goal: Discharge to home or other facility with appropriate resources  Outcome: Progressing  Flowsheets (Taken 7/12/2023 1300 by America Wang RN)  Discharge to home or other facility with appropriate resources: Identify barriers to discharge with patient and caregiver     Problem: Risk for Elopement  Goal: Patient will not exit the unit/facility without proper excort  Outcome: Progressing  Flowsheets (Taken 7/12/2023 1300 by Ameirca Wang RN)  Nursing Interventions for Elopement Risk: Assist with personal care needs such as toileting, eating, dressing, as needed to reduce the risk of wandering     Problem: Safety - Adult  Goal: Free from fall injury  Outcome: Progressing  Flowsheets (Taken 7/12/2023 1800 by America Wang RN)  Free From Fall Injury: Instruct family/caregiver on patient safety

## 2023-07-13 NOTE — PLAN OF CARE
Problem: Discharge Planning  Goal: Discharge to home or other facility with appropriate resources  7/13/2023 1414 by Ann Marie Montgomery RN  Outcome: Progressing  7/13/2023 0057 by Socorro Elizondo RN  Outcome: Progressing  Flowsheets (Taken 7/12/2023 1300 by Cherelle Arias RN)  Discharge to home or other facility with appropriate resources: Identify barriers to discharge with patient and caregiver     Problem: Risk for Elopement  Goal: Patient will not exit the unit/facility without proper excort  7/13/2023 1414 by Ann Marie Montgomery RN  Outcome: Progressing  7/13/2023 0057 by Socorro Elizondo RN  Outcome: Progressing  Flowsheets (Taken 7/12/2023 1300 by Cherelle Arias, RN)  Nursing Interventions for Elopement Risk: Assist with personal care needs such as toileting, eating, dressing, as needed to reduce the risk of wandering     Problem: Safety - Adult  Goal: Free from fall injury  7/13/2023 1414 by Ann Marie Montgomery RN  Outcome: Progressing  7/13/2023 0057 by Socorro Elizondo RN  Outcome: Progressing  Flowsheets (Taken 7/12/2023 1800 by Cherelle Arias RN)  Free From Fall Injury: Instruct family/caregiver on patient safety     Problem: Skin/Tissue Integrity  Goal: Absence of new skin breakdown  Description: 1. Monitor for areas of redness and/or skin breakdown  2. Assess vascular access sites hourly  3. Every 4-6 hours minimum:  Change oxygen saturation probe site  4. Every 4-6 hours:  If on nasal continuous positive airway pressure, respiratory therapy assess nares and determine need for appliance change or resting period.   Outcome: Progressing

## 2023-07-13 NOTE — FLOWSHEET NOTE
Patient stated the Bipap was too much pressure, will reassess in an hour. Placed patient on nasal canula.

## 2023-07-13 NOTE — PROGRESS NOTES
Attempt to obtain home medication list from patient. Patient is unsure of home medications and pharmacy.

## 2023-07-13 NOTE — CARE COORDINATION
Transition of Care-Met with patient at the bedside, she is under SI precautions, sitter at bedside. Patient resides at a [facility]'s [facility]-she stated she can return however sergio need continued medical care-reached out to [employer] to review. Patient is currently on a Heparin gtt, also found to have a pancreatic mass. PT/OT ordered to assist in discharge planning. Discharge planning pending clinical progression.      Jessica BOWSERN, RN  [facility]

## 2023-07-13 NOTE — PROGRESS NOTES
Call placed to patient's first contact, Marine Escobar. No answer, voice message left to return call to ICU. Call placed to second contact, [facility]. Spoke with staff member that states that patient is not an active patient with [facility]. Call placed to patient's third contact, Noemy Hall. Discussed admission to ICU with Celina Goldman states that she can visit this evening and will be bringing patient's son, Aranza Goldman. No questions at this time. Patient updated. All needs met at this time. Will continue to monitor.

## 2023-07-14 PROBLEM — E43 SEVERE PROTEIN-CALORIE MALNUTRITION (HCC): Chronic | Status: ACTIVE | Noted: 2023-07-14

## 2023-07-14 LAB
ALBUMIN SERPL-MCNC: 2.9 G/DL (ref 3.5–5.2)
ALP SERPL-CCNC: 216 U/L (ref 35–104)
ALT SERPL-CCNC: 33 U/L (ref 0–32)
ANION GAP SERPL CALCULATED.3IONS-SCNC: 6 MMOL/L (ref 7–16)
APTT BLD: 59.1 SEC (ref 24.5–35.1)
AST SERPL-CCNC: 57 U/L (ref 0–31)
B.E.: 7.2 MMOL/L (ref -3–3)
BACTERIA UR CULT: NORMAL
BASOPHILS # BLD: 0.04 E9/L (ref 0–0.2)
BASOPHILS NFR BLD: 0.6 % (ref 0–2)
BILIRUB SERPL-MCNC: 0.7 MG/DL (ref 0–1.2)
BUN SERPL-MCNC: 11 MG/DL (ref 6–23)
CALCIUM SERPL-MCNC: 8.1 MG/DL (ref 8.6–10.2)
CHLORIDE SERPL-SCNC: 95 MMOL/L (ref 98–107)
CO2 SERPL-SCNC: 31 MMOL/L (ref 22–29)
COHB: 1.1 % (ref 0–1.5)
CREAT SERPL-MCNC: 0.9 MG/DL (ref 0.5–1)
CRITICAL: ABNORMAL
DATE ANALYZED: ABNORMAL
DATE OF COLLECTION: ABNORMAL
EOSINOPHIL # BLD: 0.11 E9/L (ref 0.05–0.5)
EOSINOPHIL NFR BLD: 1.7 % (ref 0–6)
ERYTHROCYTE [DISTWIDTH] IN BLOOD BY AUTOMATED COUNT: 14.3 FL (ref 11.5–15)
GLUCOSE SERPL-MCNC: 88 MG/DL (ref 74–99)
HCO3: 32.1 MMOL/L (ref 22–26)
HCT VFR BLD AUTO: 23.3 % (ref 34–48)
HGB BLD-MCNC: 7.8 G/DL (ref 11.5–15.5)
HHB: 1.4 % (ref 0–5)
IMM GRANULOCYTES # BLD: 0.03 E9/L
IMM GRANULOCYTES NFR BLD: 0.5 % (ref 0–5)
LAB: ABNORMAL
LYMPHOCYTES # BLD: 0.79 E9/L (ref 1.5–4)
LYMPHOCYTES NFR BLD: 11.9 % (ref 20–42)
Lab: ABNORMAL
MAGNESIUM SERPL-MCNC: 1.8 MG/DL (ref 1.6–2.6)
MAGNESIUM SERPL-MCNC: 2.1 MG/DL (ref 1.6–2.6)
MCH RBC QN AUTO: 32 PG (ref 26–35)
MCHC RBC AUTO-ENTMCNC: 33.5 % (ref 32–34.5)
MCV RBC AUTO: 95.5 FL (ref 80–99.9)
METHB: 0.3 % (ref 0–1.5)
MODE: ABNORMAL
MONOCYTES # BLD: 0.72 E9/L (ref 0.1–0.95)
MONOCYTES NFR BLD: 10.9 % (ref 2–12)
MRSA SPEC QL CULT: NORMAL
NEUTROPHILS # BLD: 4.93 E9/L (ref 1.8–7.3)
NEUTS SEG NFR BLD: 74.4 % (ref 43–80)
O2 CONTENT: 11.9 ML/DL
O2 SATURATION: 98.6 % (ref 92–98.5)
O2HB: 97.2 % (ref 94–97)
OPERATOR ID: ABNORMAL
PATIENT TEMP: 37 C
PCO2: 48.4 MMHG (ref 35–45)
PH BLOOD GAS: 7.44 (ref 7.35–7.45)
PHOSPHATE SERPL-MCNC: 2.1 MG/DL (ref 2.5–4.5)
PHOSPHATE SERPL-MCNC: 2.8 MG/DL (ref 2.5–4.5)
PLATELET # BLD AUTO: 155 E9/L (ref 130–450)
PMV BLD AUTO: 11.2 FL (ref 7–12)
PO2: 120.7 MMHG (ref 75–100)
POTASSIUM SERPL-SCNC: 3.7 MMOL/L (ref 3.5–5)
POTASSIUM SERPL-SCNC: 3.8 MMOL/L (ref 3.5–5)
PROT SERPL-MCNC: 5.2 G/DL (ref 6.4–8.3)
RBC # BLD AUTO: 2.44 E12/L (ref 3.5–5.5)
SODIUM SERPL-SCNC: 132 MMOL/L (ref 132–146)
SOURCE, BLOOD GAS: ABNORMAL
THB: 8.5 G/DL (ref 11.5–16.5)
TIME ANALYZED: 746
WBC # BLD: 6.6 E9/L (ref 4.5–11.5)

## 2023-07-14 PROCEDURE — 80053 COMPREHEN METABOLIC PANEL: CPT

## 2023-07-14 PROCEDURE — 99232 SBSQ HOSP IP/OBS MODERATE 35: CPT | Performed by: CLINICAL NURSE SPECIALIST

## 2023-07-14 PROCEDURE — 2580000003 HC RX 258: Performed by: NURSE PRACTITIONER

## 2023-07-14 PROCEDURE — 36415 COLL VENOUS BLD VENIPUNCTURE: CPT

## 2023-07-14 PROCEDURE — 82805 BLOOD GASES W/O2 SATURATION: CPT

## 2023-07-14 PROCEDURE — 37799 UNLISTED PX VASCULAR SURGERY: CPT

## 2023-07-14 PROCEDURE — 6370000000 HC RX 637 (ALT 250 FOR IP): Performed by: INTERNAL MEDICINE

## 2023-07-14 PROCEDURE — 84100 ASSAY OF PHOSPHORUS: CPT

## 2023-07-14 PROCEDURE — 6360000002 HC RX W HCPCS: Performed by: NURSE PRACTITIONER

## 2023-07-14 PROCEDURE — 84132 ASSAY OF SERUM POTASSIUM: CPT

## 2023-07-14 PROCEDURE — 2700000000 HC OXYGEN THERAPY PER DAY

## 2023-07-14 PROCEDURE — 85730 THROMBOPLASTIN TIME PARTIAL: CPT

## 2023-07-14 PROCEDURE — 99233 SBSQ HOSP IP/OBS HIGH 50: CPT | Performed by: INTERNAL MEDICINE

## 2023-07-14 PROCEDURE — 94660 CPAP INITIATION&MGMT: CPT

## 2023-07-14 PROCEDURE — 2500000003 HC RX 250 WO HCPCS: Performed by: NURSE PRACTITIONER

## 2023-07-14 PROCEDURE — 6370000000 HC RX 637 (ALT 250 FOR IP)

## 2023-07-14 PROCEDURE — 83735 ASSAY OF MAGNESIUM: CPT

## 2023-07-14 PROCEDURE — 85025 COMPLETE CBC W/AUTO DIFF WBC: CPT

## 2023-07-14 PROCEDURE — 6370000000 HC RX 637 (ALT 250 FOR IP): Performed by: NURSE PRACTITIONER

## 2023-07-14 PROCEDURE — 1200000000 HC SEMI PRIVATE

## 2023-07-14 RX ORDER — LANOLIN ALCOHOL/MO/W.PET/CERES
100 CREAM (GRAM) TOPICAL DAILY
Status: DISCONTINUED | OUTPATIENT
Start: 2023-07-14 | End: 2023-07-19 | Stop reason: HOSPADM

## 2023-07-14 RX ORDER — MAGNESIUM SULFATE IN WATER 40 MG/ML
2000 INJECTION, SOLUTION INTRAVENOUS ONCE
Status: COMPLETED | OUTPATIENT
Start: 2023-07-14 | End: 2023-07-14

## 2023-07-14 RX ADMIN — SODIUM CHLORIDE, PRESERVATIVE FREE 10 ML: 5 INJECTION INTRAVENOUS at 08:13

## 2023-07-14 RX ADMIN — ACETAMINOPHEN 650 MG: 325 TABLET ORAL at 20:37

## 2023-07-14 RX ADMIN — SODIUM CHLORIDE, PRESERVATIVE FREE 10 ML: 5 INJECTION INTRAVENOUS at 20:38

## 2023-07-14 RX ADMIN — SODIUM CHLORIDE, PRESERVATIVE FREE 10 ML: 5 INJECTION INTRAVENOUS at 12:29

## 2023-07-14 RX ADMIN — Medication: at 08:13

## 2023-07-14 RX ADMIN — Medication 100 MG: at 10:58

## 2023-07-14 RX ADMIN — MAGNESIUM SULFATE HEPTAHYDRATE 2000 MG: 40 INJECTION, SOLUTION INTRAVENOUS at 06:50

## 2023-07-14 RX ADMIN — APIXABAN 10 MG: 5 TABLET, FILM COATED ORAL at 10:54

## 2023-07-14 RX ADMIN — FOLIC ACID 1 MG: 1 TABLET ORAL at 08:13

## 2023-07-14 RX ADMIN — APIXABAN 10 MG: 5 TABLET, FILM COATED ORAL at 20:37

## 2023-07-14 RX ADMIN — SODIUM CHLORIDE, PRESERVATIVE FREE 10 ML: 5 INJECTION INTRAVENOUS at 10:55

## 2023-07-14 RX ADMIN — ACETAMINOPHEN 650 MG: 325 TABLET ORAL at 00:14

## 2023-07-14 RX ADMIN — MULTIVITAMIN TABLET 1 TABLET: TABLET at 08:13

## 2023-07-14 RX ADMIN — POTASSIUM PHOSPHATE, MONOBASIC AND POTASSIUM PHOSPHATE, DIBASIC 20 MMOL: 224; 236 INJECTION, SOLUTION, CONCENTRATE INTRAVENOUS at 06:49

## 2023-07-14 ASSESSMENT — PAIN SCALES - GENERAL
PAINLEVEL_OUTOF10: 4
PAINLEVEL_OUTOF10: 0
PAINLEVEL_OUTOF10: 0
PAINLEVEL_OUTOF10: 3
PAINLEVEL_OUTOF10: 0

## 2023-07-14 ASSESSMENT — PAIN DESCRIPTION - LOCATION: LOCATION: GENERALIZED

## 2023-07-14 NOTE — PROGRESS NOTES
Comprehensive Nutrition Assessment    Type and Reason for Visit:  Initial, RD Nutrition Re-Screen/LOS    Nutrition Recommendations/Plan:   Continue current diet and ONS with all meals, as tolerated  Continue inpatient monitoring     Malnutrition Assessment:  Malnutrition Status:  Severe malnutrition (07/14/23 1326)    Context:  Chronic Illness     Findings of the 6 clinical characteristics of malnutrition:  Energy Intake:  75% or less estimated energy requirements for 1 month or longer  Weight Loss:  Unable to assess     Body Fat Loss:  Severe body fat loss Triceps   Muscle Mass Loss:  Severe muscle mass loss Clavicles (pectoralis & deltoids)  Fluid Accumulation:  No significant fluid accumulation     Strength:  Not Performed    Nutrition Assessment:    Pt admit 2/2 acute resp failure. PMH includes ETOH abuse, schizophrenia, COPD. Had presented to ED and was pink-slipped d/t poss OD/SI. Pt transfer to ICU after she became unresponsive. Currently, imaging very concerning for Stage 4 pancreatic adenocarcinoma. Will add ONS to all meals to optimize nutrient intake, as pt meets criteria for malnutrition. Nutrition Related Findings:    Cachectic, A&Ox2, on RA, elevated LFT, edentulous, Sitter at bedside, +I/O 2.9L, no edema, abd +BS, hypophosphatemia Wound Type: None (ecchymotic areas, abrasion on forehead)       Current Nutrition Intake & Therapies:    Average Meal Intake: 26-50% (since admit)  Average Supplements Intake: None Ordered  ADULT DIET; Regular; Safety Tray; Safety Tray (Disposables)  ADULT ORAL NUTRITION SUPPLEMENT; Breakfast; Clear Liquid Oral Supplement  ADULT ORAL NUTRITION SUPPLEMENT; Lunch, Dinner; Frozen Oral Supplement    Anthropometric Measures:  Height: 5' 11\" (180.3 cm)  Ideal Body Weight (IBW): 155 lbs (70 kg)       Current Body Weight: 125 lb (56.7 kg), 80.6 % IBW.  Weight Source: Not Specified (7/11)  Current BMI (kg/m2): 17.4  Usual Body Weight: 145 lb (65.8 kg) (Remote EMR review

## 2023-07-14 NOTE — CARE COORDINATION
Transition of Care-Oncology following-patient is interested in work-up for pancreatic tail mass. She is from a homeless woman's shelter[facility] return there upon discharge. [facility] following-she will not be able to pursue chemotherapy if she goes to a an LTACH or SNF as it is not a covered service, will have to return home. Awaiting therapy evaluations.      Moe BOWSERN, RN  [facility]

## 2023-07-14 NOTE — PROGRESS NOTES
hypercapnic respiratory failure  Secondary to polysubstance reduce  Methadone was positive, can take 60 hours to metabolize  PRN narcan ordered  BIPAP as needed  Patient still requires nasal cannula  Metabolic Acidosis   Resolved  Bicarbonate drip discontinued   Pancreatic lesion  Hepatobiliary surgery reevaluated patient today  Elevated CEA at 7.6  Plan for outpatient biopsy  Pulmonary embolism   Transition patient to eliquis  Polysubstance abuse  PRN narcan ordered  Social work following  Patient no longer needing narcan  Suicidal ideation  Mental health hold decision deferred to psychiatry. Patient no longer requires ICU level of care. Patient able to be downgraded to monitored floor. Peggy Signs, DO                    7/14/2023, 2:19 PM      NOTE: This report, in part or full, may have been transcribed using voice recognition software. Every effort was made to ensure accuracy; however, inadvertent computerized transcription errors may be present. Please excuse any transcriptional grammatical or spelling errors that may have escaped my editorial review. I personally saw, examined and provided care for the patient. Radiographs, labs and medication list were reviewed by me independently. I spoke with bedside nursing, therapists and consultants. Critical care services and times documented are independent of procedures and multidisciplinary rounds with Residents. Additionally comprehensive, multidisciplinary rounds were conducted with the MICU team. The case was discussed in detail and plans for care were established. Review of Residents documentation was conducted and revisions were made as appropriate. I agree with the above documented exam, problem list and plan of care.    Dionne Barajas MD   CCT excluding procedures :28'

## 2023-07-14 NOTE — PROGRESS NOTES
Intensive Care Daily Quality Rounding Checklist      ICU Team Members: Dr. Promise Joy, Dr. Canelo Vaughan (resident), charge nurse, bedside nurse, respiratory therapist    ICU Day #: NUMBER: 3    Intubation Date:  N/A    Ventilator Day #: N/A    Central Line Insertion Date: July 12        Day #: NUMBER: 3        Indication: none, will remove today     Arterial Line Insertion Date: July 12      Day #: NUMBER: 3    Temporary Hemodialysis Catheter Insertion Date:  N/A      Day # N/A    DVT Prophylaxis: Heparin drip---transition to Eliquis    GI Prophylaxis: N/A    Rodriguez Catheter Insertion Date: N/A       Day #: N/A      Indications: N/A      Continued need (if yes, reason documented and discussed with physician): N/A    Skin Issues/ Wounds and ordered treatment discussed on rounds: No issues    Goals/ Plans for the Day: Daily labs, replace electrolytes, advance activity as able, transition to oral anticoagulation, wean O2 off, discharge planning

## 2023-07-14 NOTE — PLAN OF CARE
Problem: Risk for Elopement  Goal: Patient will not exit the unit/facility without proper excort  7/14/2023 0904 by Reyes Pedersen RN  Outcome: Progressing  Flowsheets (Taken 7/14/2023 0800)  Nursing Interventions for Elopement Risk: Assist with personal care needs such as toileting, eating, dressing, as needed to reduce the risk of wandering

## 2023-07-14 NOTE — PROGRESS NOTES
Periportal edema. Mildly thickened gallbladder wall and pericholecystic fluid. Small amount of free fluid in the pelvis. Other findings as described. CTA PULMONARY W CONTRAST   Final Result   Positive for left lower lobe pulmonary embolism. Pancreatic mass suspicious for primary malignancy. Multiple small lung nodules consistent with metastatic disease. Multiple hypoenhancing liver lesions consistent with metastatic disease. Hypoenhancing splenic lesions. Periportal edema. Mildly thickened gallbladder wall and pericholecystic fluid. Small amount of free fluid in the pelvis. Other findings as described. CT HEAD WO CONTRAST   Final Result   No acute intracranial abnormality. Specifically, there is no acute   intracranial hemorrhage         XR CHEST (2 VW)   Final Result   No evidence of acute cardiopulmonary disease is seen. Assessment:    Principal Problem:    Acute hypercapnic respiratory failure (HCC)  Active Problems:    WILLIE (acute kidney injury) ([address])    Schizophrenia ([address])    Pancreatic mass    Pulmonary nodules/lesions, multiple    Liver lesion    Septic shock (HCC)    Acute pulmonary embolism without acute cor pulmonale (HCC)    Thickening of wall of gallbladder    Splenic lesion    Intentional methadone overdose ([address])    Suicidal ideation    Opioid intoxication delirium ([address])    Severe protein-calorie malnutrition ([address])  Resolved Problems:    * No resolved hospital problems. *      Plan:  Acute hypoxic respiratory failure, significant improvement, he remains on supplemental oxygen. Pancreatic mass, metastatic lesions, seen by oncology, likely will need EUS and biopsy, per oncology if patient plans to pursue. Overdose, patient seen by psychiatry, likely recreational, patient is of pink slip and no one-to-one observation. Schizophrenia, will continue home medications. COPD, stable.         Electronically signed by

## 2023-07-14 NOTE — PLAN OF CARE
Problem: Discharge Planning  Goal: Discharge to home or other facility with appropriate resources  7/14/2023 0141 by Isai Tom RN  Outcome: Progressing  7/13/2023 1414 by Frank Almanza RN  Outcome: Progressing     Problem: Risk for Elopement  Goal: Patient will not exit the unit/facility without proper excort  7/14/2023 0141 by Isai Tom RN  Outcome: Progressing  7/13/2023 1414 by Frank Almanza RN  Outcome: Progressing     Problem: Safety - Adult  Goal: Free from fall injury  7/14/2023 0141 by Isai Tom RN  Outcome: Progressing  7/13/2023 1414 by Frank Almanza RN  Outcome: Progressing     Problem: Skin/Tissue Integrity  Goal: Absence of new skin breakdown  Description: 1. Monitor for areas of redness and/or skin breakdown  2. Assess vascular access sites hourly  3. Every 4-6 hours minimum:  Change oxygen saturation probe site  4. Every 4-6 hours:  If on nasal continuous positive airway pressure, respiratory therapy assess nares and determine need for appliance change or resting period.   7/14/2023 0141 by Isai Tom RN  Outcome: Progressing  7/13/2023 1414 by Frank Almanza RN  Outcome: Progressing     Problem: Pain  Goal: Verbalizes/displays adequate comfort level or baseline comfort level  Outcome: Progressing

## 2023-07-15 LAB
ALBUMIN SERPL-MCNC: 2.8 G/DL (ref 3.5–5.2)
ALP SERPL-CCNC: 228 U/L (ref 35–104)
ALT SERPL-CCNC: 25 U/L (ref 0–32)
ANION GAP SERPL CALCULATED.3IONS-SCNC: 7 MMOL/L (ref 7–16)
AST SERPL-CCNC: 41 U/L (ref 0–31)
BASOPHILS # BLD: 0.02 K/UL (ref 0–0.2)
BASOPHILS NFR BLD: 0 % (ref 0–2)
BILIRUB SERPL-MCNC: 0.8 MG/DL (ref 0–1.2)
BUN SERPL-MCNC: 8 MG/DL (ref 6–23)
CALCIUM SERPL-MCNC: 8.5 MG/DL (ref 8.6–10.2)
CANCER AG19-9 SERPL-ACNC: ABNORMAL U/ML
CGA SERPL-MCNC: 218 NG/ML (ref 0–103)
CHLORIDE SERPL-SCNC: 100 MMOL/L (ref 98–107)
CO2 SERPL-SCNC: 31 MMOL/L (ref 22–29)
CREAT SERPL-MCNC: 0.7 MG/DL (ref 0.5–1)
EOSINOPHIL # BLD: 0.11 K/UL (ref 0.05–0.5)
EOSINOPHILS RELATIVE PERCENT: 2 % (ref 0–6)
ERYTHROCYTE [DISTWIDTH] IN BLOOD BY AUTOMATED COUNT: 14.3 % (ref 11.5–15)
GFR SERPL CREATININE-BSD FRML MDRD: >60 ML/MIN/1.73M2
GLUCOSE SERPL-MCNC: 82 MG/DL (ref 74–99)
HCT VFR BLD AUTO: 24 % (ref 34–48)
HGB BLD-MCNC: 7.8 G/DL (ref 11.5–15.5)
IMM GRANULOCYTES # BLD AUTO: <0.03 K/UL (ref 0–0.58)
IMM GRANULOCYTES NFR BLD: 0 % (ref 0–5)
LYMPHOCYTES # BLD: 11 % (ref 20–42)
LYMPHOCYTES NFR BLD: 0.54 K/UL (ref 1.5–4)
MAGNESIUM SERPL-MCNC: 1.8 MG/DL (ref 1.6–2.6)
MCH RBC QN AUTO: 31.3 PG (ref 26–35)
MCHC RBC AUTO-ENTMCNC: 32.5 G/DL (ref 32–34.5)
MCV RBC AUTO: 96.4 FL (ref 80–99.9)
MONOCYTES NFR BLD: 0.56 K/UL (ref 0.1–0.95)
MONOCYTES NFR BLD: 11 % (ref 2–12)
NEUTROPHILS NFR BLD: 75 % (ref 43–80)
NEUTS SEG NFR BLD: 3.73 K/UL (ref 1.8–7.3)
PHOSPHATE SERPL-MCNC: 2.6 MG/DL (ref 2.5–4.5)
PLATELET # BLD AUTO: 156 K/UL (ref 130–450)
PMV BLD AUTO: 11.2 FL (ref 7–12)
POTASSIUM SERPL-SCNC: 3.6 MMOL/L (ref 3.5–5)
PROT SERPL-MCNC: 5.2 G/DL (ref 6.4–8.3)
RBC # BLD AUTO: 2.49 M/UL (ref 3.5–5.5)
SODIUM SERPL-SCNC: 138 MMOL/L (ref 132–146)
WBC OTHER # BLD: 5 K/UL (ref 4.5–11.5)

## 2023-07-15 PROCEDURE — 86316 IMMUNOASSAY TUMOR OTHER: CPT

## 2023-07-15 PROCEDURE — 6360000002 HC RX W HCPCS: Performed by: INTERNAL MEDICINE

## 2023-07-15 PROCEDURE — 83735 ASSAY OF MAGNESIUM: CPT

## 2023-07-15 PROCEDURE — 99232 SBSQ HOSP IP/OBS MODERATE 35: CPT | Performed by: CLINICAL NURSE SPECIALIST

## 2023-07-15 PROCEDURE — 6370000000 HC RX 637 (ALT 250 FOR IP): Performed by: NURSE PRACTITIONER

## 2023-07-15 PROCEDURE — 2700000000 HC OXYGEN THERAPY PER DAY

## 2023-07-15 PROCEDURE — 84100 ASSAY OF PHOSPHORUS: CPT

## 2023-07-15 PROCEDURE — 1200000000 HC SEMI PRIVATE

## 2023-07-15 PROCEDURE — 6370000000 HC RX 637 (ALT 250 FOR IP)

## 2023-07-15 PROCEDURE — 85027 COMPLETE CBC AUTOMATED: CPT

## 2023-07-15 PROCEDURE — 2580000003 HC RX 258: Performed by: NURSE PRACTITIONER

## 2023-07-15 PROCEDURE — 99233 SBSQ HOSP IP/OBS HIGH 50: CPT | Performed by: INTERNAL MEDICINE

## 2023-07-15 PROCEDURE — 80053 COMPREHEN METABOLIC PANEL: CPT

## 2023-07-15 PROCEDURE — 94660 CPAP INITIATION&MGMT: CPT

## 2023-07-15 PROCEDURE — 6370000000 HC RX 637 (ALT 250 FOR IP): Performed by: INTERNAL MEDICINE

## 2023-07-15 RX ORDER — ONDANSETRON 2 MG/ML
4 INJECTION INTRAMUSCULAR; INTRAVENOUS EVERY 4 HOURS PRN
Status: DISCONTINUED | OUTPATIENT
Start: 2023-07-15 | End: 2023-07-19 | Stop reason: HOSPADM

## 2023-07-15 RX ADMIN — FOLIC ACID 1 MG: 1 TABLET ORAL at 07:47

## 2023-07-15 RX ADMIN — ONDANSETRON 4 MG: 2 INJECTION INTRAMUSCULAR; INTRAVENOUS at 17:53

## 2023-07-15 RX ADMIN — APIXABAN 10 MG: 5 TABLET, FILM COATED ORAL at 07:47

## 2023-07-15 RX ADMIN — SODIUM CHLORIDE, PRESERVATIVE FREE 10 ML: 5 INJECTION INTRAVENOUS at 07:47

## 2023-07-15 RX ADMIN — Medication 100 MG: at 07:48

## 2023-07-15 RX ADMIN — ACETAMINOPHEN 650 MG: 325 TABLET ORAL at 18:01

## 2023-07-15 RX ADMIN — SODIUM CHLORIDE, PRESERVATIVE FREE 10 ML: 5 INJECTION INTRAVENOUS at 21:07

## 2023-07-15 RX ADMIN — APIXABAN 10 MG: 5 TABLET, FILM COATED ORAL at 21:07

## 2023-07-15 RX ADMIN — MULTIVITAMIN TABLET 1 TABLET: TABLET at 07:48

## 2023-07-15 ASSESSMENT — PAIN DESCRIPTION - DESCRIPTORS
DESCRIPTORS: TENDER
DESCRIPTORS: SORE;TENDER
DESCRIPTORS: SORE

## 2023-07-15 ASSESSMENT — PAIN DESCRIPTION - ORIENTATION
ORIENTATION: UPPER
ORIENTATION: UPPER

## 2023-07-15 ASSESSMENT — PAIN DESCRIPTION - LOCATION
LOCATION: ABDOMEN
LOCATION: ABDOMEN;CHEST;HEAD
LOCATION: ABDOMEN
LOCATION: GENERALIZED
LOCATION: ABDOMEN

## 2023-07-15 ASSESSMENT — PAIN SCALES - GENERAL
PAINLEVEL_OUTOF10: 2
PAINLEVEL_OUTOF10: 5
PAINLEVEL_OUTOF10: 9
PAINLEVEL_OUTOF10: 8
PAINLEVEL_OUTOF10: 9

## 2023-07-15 NOTE — PLAN OF CARE
Problem: Discharge Planning  Goal: Discharge to home or other facility with appropriate resources  Outcome: Progressing     Problem: Risk for Elopement  Goal: Patient will not exit the unit/facility without proper excort  Outcome: Progressing     Problem: Safety - Adult  Goal: Free from fall injury  Outcome: Progressing     Problem: Skin/Tissue Integrity  Goal: Absence of new skin breakdown  Description: 1. Monitor for areas of redness and/or skin breakdown  2. Assess vascular access sites hourly  3. Every 4-6 hours minimum:  Change oxygen saturation probe site  4. Every 4-6 hours:  If on nasal continuous positive airway pressure, respiratory therapy assess nares and determine need for appliance change or resting period.   Outcome: Progressing     Problem: Pain  Goal: Verbalizes/displays adequate comfort level or baseline comfort level  Outcome: Progressing     Problem: Nutrition Deficit:  Goal: Optimize nutritional status  Outcome: Progressing

## 2023-07-15 NOTE — PROGRESS NOTES
Intensive Care Daily Quality Rounding Checklist      ICU Team Members: General medical    ICU Day #: NUMBER: N/A    Intubation Date:  N/A    Ventilator Day #: N/A    Central Line Insertion Date:  N/A        Day #: N/A        Indication: N/A     Arterial Line Insertion Date:  N/A      Day #: N/A    Temporary Hemodialysis Catheter Insertion Date:  N/A      Day # N/A    DVT Prophylaxis: Eliquis    GI Prophylaxis: N/A    Rodriguez Catheter Insertion Date:  N/A       Day #: N/A      Indications: N/A      Continued need (if yes, reason documented and discussed with physician): N/A    Skin Issues/ Wounds and ordered treatment discussed on rounds: No issues    Goals/ Plans for the Day:  Daily labs, wean oxygen off, advance activity as tolerated, discharge planning.

## 2023-07-15 NOTE — PROGRESS NOTES
Date: 7/15/2023    Time: 7:58 PM    Patient Placed On BIPAP/CPAP/ Non-Invasive Ventilation? No    If no must comment. Comments: Patient is refusing to wear the AVAPS 24 500 35% +5. Machine is in the room on stanby if needed. Patient is currently resting on a 2L NC sating 98%. Patient is in no visible distress.         Lou aGrcia RCP

## 2023-07-15 NOTE — PLAN OF CARE
Problem: Discharge Planning  Goal: Discharge to home or other facility with appropriate resources  7/15/2023 0945 by Bacilio Mendez RN  Outcome: Progressing  Flowsheets (Taken 7/15/2023 0800)  Discharge to home or other facility with appropriate resources: Identify barriers to discharge with patient and caregiver  7/15/2023 0438 by Cindy Whitt RN  Outcome: Progressing     Problem: Risk for Elopement  Goal: Patient will not exit the unit/facility without proper excort  7/15/2023 0945 by Bacilio Mendez RN  Outcome: Progressing  Flowsheets (Taken 7/15/2023 0800)  Nursing Interventions for Elopement Risk:   Assist with personal care needs such as toileting, eating, dressing, as needed to reduce the risk of wandering   Make sure patient has all necessary personal care items   Place patient in room far away from exits and stairways  7/15/2023 0438 by Cindy Whitt RN  Outcome: Progressing     Problem: Safety - Adult  Goal: Free from fall injury  7/15/2023 0945 by Bacilio Mendez RN  Outcome: Progressing  Flowsheets (Taken 7/15/2023 0943)  Free From Fall Injury: Instruct family/caregiver on patient safety  7/15/2023 0438 by Cindy Whitt RN  Outcome: Progressing     Problem: Skin/Tissue Integrity  Goal: Absence of new skin breakdown  Description: 1. Monitor for areas of redness and/or skin breakdown  2. Assess vascular access sites hourly  3. Every 4-6 hours minimum:  Change oxygen saturation probe site  4. Every 4-6 hours:  If on nasal continuous positive airway pressure, respiratory therapy assess nares and determine need for appliance change or resting period.   7/15/2023 0945 by Bacilio Mendez RN  Outcome: Progressing  7/15/2023 0438 by Cindy Whitt RN  Outcome: Progressing     Problem: Pain  Goal: Verbalizes/displays adequate comfort level or baseline comfort level  7/15/2023 0945 by Bacilio Mendez RN  Outcome: Progressing  Flowsheets (Taken 7/15/2023

## 2023-07-16 LAB
ALBUMIN SERPL-MCNC: 2.6 G/DL (ref 3.5–5.2)
ALP SERPL-CCNC: 264 U/L (ref 35–104)
ALT SERPL-CCNC: 23 U/L (ref 0–32)
ANION GAP SERPL CALCULATED.3IONS-SCNC: 8 MMOL/L (ref 7–16)
AST SERPL-CCNC: 36 U/L (ref 0–31)
BASOPHILS # BLD: 0.03 K/UL (ref 0–0.2)
BASOPHILS NFR BLD: 1 % (ref 0–2)
BILIRUB SERPL-MCNC: 0.7 MG/DL (ref 0–1.2)
BUN SERPL-MCNC: 11 MG/DL (ref 6–23)
CALCIUM SERPL-MCNC: 8.4 MG/DL (ref 8.6–10.2)
CHLORIDE SERPL-SCNC: 99 MMOL/L (ref 98–107)
CO2 SERPL-SCNC: 30 MMOL/L (ref 22–29)
CREAT SERPL-MCNC: 0.8 MG/DL (ref 0.5–1)
EOSINOPHIL # BLD: 0.15 K/UL (ref 0.05–0.5)
EOSINOPHILS RELATIVE PERCENT: 3 % (ref 0–6)
ERYTHROCYTE [DISTWIDTH] IN BLOOD BY AUTOMATED COUNT: 14.5 % (ref 11.5–15)
GFR SERPL CREATININE-BSD FRML MDRD: >60 ML/MIN/1.73M2
GLUCOSE SERPL-MCNC: 79 MG/DL (ref 74–99)
HCT VFR BLD AUTO: 24.8 % (ref 34–48)
HGB BLD-MCNC: 8 G/DL (ref 11.5–15.5)
IMM GRANULOCYTES # BLD AUTO: <0.03 K/UL (ref 0–0.58)
IMM GRANULOCYTES NFR BLD: 0 % (ref 0–5)
LYMPHOCYTES # BLD: 17 % (ref 20–42)
LYMPHOCYTES NFR BLD: 0.84 K/UL (ref 1.5–4)
MAGNESIUM SERPL-MCNC: 1.7 MG/DL (ref 1.6–2.6)
MCH RBC QN AUTO: 31.6 PG (ref 26–35)
MCHC RBC AUTO-ENTMCNC: 32.3 G/DL (ref 32–34.5)
MCV RBC AUTO: 98 FL (ref 80–99.9)
MONOCYTES NFR BLD: 0.73 K/UL (ref 0.1–0.95)
MONOCYTES NFR BLD: 15 % (ref 2–12)
NEUTROPHILS NFR BLD: 64 % (ref 43–80)
NEUTS SEG NFR BLD: 3.13 K/UL (ref 1.8–7.3)
PHOSPHATE SERPL-MCNC: 3.2 MG/DL (ref 2.5–4.5)
PLATELET # BLD AUTO: 163 K/UL (ref 130–450)
PMV BLD AUTO: 11.7 FL (ref 7–12)
POTASSIUM SERPL-SCNC: 4 MMOL/L (ref 3.5–5)
PROT SERPL-MCNC: 5.1 G/DL (ref 6.4–8.3)
RBC # BLD AUTO: 2.53 M/UL (ref 3.5–5.5)
SODIUM SERPL-SCNC: 137 MMOL/L (ref 132–146)
WBC OTHER # BLD: 4.9 K/UL (ref 4.5–11.5)

## 2023-07-16 PROCEDURE — 99233 SBSQ HOSP IP/OBS HIGH 50: CPT | Performed by: INTERNAL MEDICINE

## 2023-07-16 PROCEDURE — 1200000000 HC SEMI PRIVATE

## 2023-07-16 PROCEDURE — 85027 COMPLETE CBC AUTOMATED: CPT

## 2023-07-16 PROCEDURE — 84100 ASSAY OF PHOSPHORUS: CPT

## 2023-07-16 PROCEDURE — 94660 CPAP INITIATION&MGMT: CPT

## 2023-07-16 PROCEDURE — 99232 SBSQ HOSP IP/OBS MODERATE 35: CPT | Performed by: CLINICAL NURSE SPECIALIST

## 2023-07-16 PROCEDURE — 6370000000 HC RX 637 (ALT 250 FOR IP): Performed by: INTERNAL MEDICINE

## 2023-07-16 PROCEDURE — 6370000000 HC RX 637 (ALT 250 FOR IP)

## 2023-07-16 PROCEDURE — 80053 COMPREHEN METABOLIC PANEL: CPT

## 2023-07-16 PROCEDURE — 83735 ASSAY OF MAGNESIUM: CPT

## 2023-07-16 PROCEDURE — 6370000000 HC RX 637 (ALT 250 FOR IP): Performed by: NURSE PRACTITIONER

## 2023-07-16 PROCEDURE — 2580000003 HC RX 258: Performed by: NURSE PRACTITIONER

## 2023-07-16 PROCEDURE — 2700000000 HC OXYGEN THERAPY PER DAY

## 2023-07-16 RX ORDER — FLUOXETINE HYDROCHLORIDE 20 MG/1
40 CAPSULE ORAL DAILY
Status: DISCONTINUED | OUTPATIENT
Start: 2023-07-16 | End: 2023-07-19 | Stop reason: HOSPADM

## 2023-07-16 RX ORDER — BENZTROPINE MESYLATE 1 MG/1
1 TABLET ORAL 2 TIMES DAILY
Status: DISCONTINUED | OUTPATIENT
Start: 2023-07-16 | End: 2023-07-19 | Stop reason: HOSPADM

## 2023-07-16 RX ORDER — LURASIDONE HYDROCHLORIDE 20 MG/1
40 TABLET, FILM COATED ORAL
Status: DISCONTINUED | OUTPATIENT
Start: 2023-07-16 | End: 2023-07-19 | Stop reason: HOSPADM

## 2023-07-16 RX ORDER — GABAPENTIN 300 MG/1
300 CAPSULE ORAL 2 TIMES DAILY
Status: DISCONTINUED | OUTPATIENT
Start: 2023-07-16 | End: 2023-07-19 | Stop reason: HOSPADM

## 2023-07-16 RX ADMIN — BENZTROPINE MESYLATE 1 MG: 1 TABLET ORAL at 20:28

## 2023-07-16 RX ADMIN — BENZTROPINE MESYLATE 1 MG: 1 TABLET ORAL at 09:38

## 2023-07-16 RX ADMIN — SODIUM CHLORIDE, PRESERVATIVE FREE 10 ML: 5 INJECTION INTRAVENOUS at 20:29

## 2023-07-16 RX ADMIN — Medication 100 MG: at 08:26

## 2023-07-16 RX ADMIN — APIXABAN 10 MG: 5 TABLET, FILM COATED ORAL at 08:26

## 2023-07-16 RX ADMIN — MULTIVITAMIN TABLET 1 TABLET: TABLET at 08:26

## 2023-07-16 RX ADMIN — ACETAMINOPHEN 650 MG: 325 TABLET ORAL at 09:05

## 2023-07-16 RX ADMIN — GABAPENTIN 300 MG: 300 CAPSULE ORAL at 20:28

## 2023-07-16 RX ADMIN — SODIUM CHLORIDE, PRESERVATIVE FREE 10 ML: 5 INJECTION INTRAVENOUS at 08:26

## 2023-07-16 RX ADMIN — LURASIDONE HYDROCHLORIDE 40 MG: 20 TABLET, FILM COATED ORAL at 17:34

## 2023-07-16 RX ADMIN — FLUOXETINE 40 MG: 20 CAPSULE ORAL at 09:38

## 2023-07-16 RX ADMIN — GABAPENTIN 300 MG: 300 CAPSULE ORAL at 09:05

## 2023-07-16 RX ADMIN — FOLIC ACID 1 MG: 1 TABLET ORAL at 08:26

## 2023-07-16 ASSESSMENT — PAIN SCALES - GENERAL
PAINLEVEL_OUTOF10: 1
PAINLEVEL_OUTOF10: 3
PAINLEVEL_OUTOF10: 0
PAINLEVEL_OUTOF10: 0
PAINLEVEL_OUTOF10: 3
PAINLEVEL_OUTOF10: 3

## 2023-07-16 ASSESSMENT — PAIN DESCRIPTION - LOCATION: LOCATION: ABDOMEN

## 2023-07-16 ASSESSMENT — PAIN DESCRIPTION - ORIENTATION: ORIENTATION: UPPER

## 2023-07-16 ASSESSMENT — PAIN DESCRIPTION - DESCRIPTORS: DESCRIPTORS: SHARP

## 2023-07-16 NOTE — PLAN OF CARE
Problem: Risk for Elopement  Goal: Patient will not exit the unit/facility without proper excort  Outcome: Progressing     Problem: Safety - Adult  Goal: Free from fall injury  Outcome: Progressing

## 2023-07-16 NOTE — PATIENT CARE CONFERENCE
Intensive Care Daily Quality Rounding Checklist        ICU Team Members: General medical     ICU Day #: NUMBER: N/A     Intubation Date:  N/A     Ventilator Day #: N/A     Central Line Insertion Date:  N/A                                                    Day #: N/A                                                    Indication: N/A      Arterial Line Insertion Date:  N/A                             Day #: N/A     Temporary Hemodialysis Catheter Insertion Date:  N/A                             Day # N/A     DVT Prophylaxis: Eliquis    GI Prophylaxis: N/A     Rodriguez Catheter Insertion Date:  N/A                                        Day #: N/A                             Indications: N/A                             Continued need (if yes, reason documented and discussed with physician): N/A     Skin Issues/ Wounds and ordered treatment discussed on rounds: No issues     Goals/ Plans for the Day:  Daily labs, wean oxygen off, advance activity as tolerated, For IR biopsy 7/17.

## 2023-07-16 NOTE — PLAN OF CARE
Problem: Discharge Planning  Goal: Discharge to home or other facility with appropriate resources  Outcome: Progressing  Flowsheets (Taken 7/16/2023 0800)  Discharge to home or other facility with appropriate resources: Identify barriers to discharge with patient and caregiver     Problem: Risk for Elopement  Goal: Patient will not exit the unit/facility without proper excort  7/16/2023 1024 by Lyubov Wylie RN  Outcome: Progressing  Flowsheets (Taken 7/16/2023 0800)  Nursing Interventions for Elopement Risk:   Assist with personal care needs such as toileting, eating, dressing, as needed to reduce the risk of wandering   Make sure patient has all necessary personal care items   Place patient in room far away from exits and stairways  7/16/2023 0036 by Cinthya Doty RN  Outcome: Progressing     Problem: Safety - Adult  Goal: Free from fall injury  7/16/2023 1024 by Lyubov Wylie RN  Outcome: Progressing  Flowsheets (Taken 7/16/2023 1022)  Free From Fall Injury: Instruct family/caregiver on patient safety  7/16/2023 0036 by Cinthya Doty RN  Outcome: Progressing     Problem: Skin/Tissue Integrity  Goal: Absence of new skin breakdown  Description: 1. Monitor for areas of redness and/or skin breakdown  2. Assess vascular access sites hourly  3. Every 4-6 hours minimum:  Change oxygen saturation probe site  4. Every 4-6 hours:  If on nasal continuous positive airway pressure, respiratory therapy assess nares and determine need for appliance change or resting period.   Outcome: Progressing     Problem: Pain  Goal: Verbalizes/displays adequate comfort level or baseline comfort level  Outcome: Progressing     Problem: Nutrition Deficit:  Goal: Optimize nutritional status  Outcome: Progressing     Problem: ABCDS Injury Assessment  Goal: Absence of physical injury  Outcome: Progressing  Flowsheets (Taken 7/16/2023 1022)  Absence of Physical Injury: Implement safety measures based on patient

## 2023-07-17 LAB
ALBUMIN SERPL-MCNC: 2.7 G/DL (ref 3.5–5.2)
ALP SERPL-CCNC: 282 U/L (ref 35–104)
ALT SERPL-CCNC: 25 U/L (ref 0–32)
ANION GAP SERPL CALCULATED.3IONS-SCNC: 7 MMOL/L (ref 7–16)
AST SERPL-CCNC: 40 U/L (ref 0–31)
BASOPHILS # BLD: 0.03 K/UL (ref 0–0.2)
BASOPHILS NFR BLD: 1 % (ref 0–2)
BILIRUB SERPL-MCNC: 0.8 MG/DL (ref 0–1.2)
BUN SERPL-MCNC: 10 MG/DL (ref 6–23)
CALCIUM SERPL-MCNC: 8.7 MG/DL (ref 8.6–10.2)
CHLORIDE SERPL-SCNC: 101 MMOL/L (ref 98–107)
CO2 SERPL-SCNC: 29 MMOL/L (ref 22–29)
CREAT SERPL-MCNC: 0.7 MG/DL (ref 0.5–1)
EOSINOPHIL # BLD: 0.23 K/UL (ref 0.05–0.5)
EOSINOPHILS RELATIVE PERCENT: 5 % (ref 0–6)
ERYTHROCYTE [DISTWIDTH] IN BLOOD BY AUTOMATED COUNT: 14.6 % (ref 11.5–15)
GFR SERPL CREATININE-BSD FRML MDRD: >60 ML/MIN/1.73M2
GLUCOSE SERPL-MCNC: 78 MG/DL (ref 74–99)
HCT VFR BLD AUTO: 25.3 % (ref 34–48)
HGB BLD-MCNC: 8.2 G/DL (ref 11.5–15.5)
IMM GRANULOCYTES # BLD AUTO: <0.03 K/UL (ref 0–0.58)
IMM GRANULOCYTES NFR BLD: 0 % (ref 0–5)
INR PPP: 1.5
LYMPHOCYTES # BLD: 14 % (ref 20–42)
LYMPHOCYTES NFR BLD: 0.71 K/UL (ref 1.5–4)
MAGNESIUM SERPL-MCNC: 1.6 MG/DL (ref 1.6–2.6)
MCH RBC QN AUTO: 31.4 PG (ref 26–35)
MCHC RBC AUTO-ENTMCNC: 32.4 G/DL (ref 32–34.5)
MCV RBC AUTO: 96.9 FL (ref 80–99.9)
MONOCYTES NFR BLD: 0.82 K/UL (ref 0.1–0.95)
MONOCYTES NFR BLD: 16 % (ref 2–12)
NEUTROPHILS NFR BLD: 65 % (ref 43–80)
NEUTS SEG NFR BLD: 3.34 K/UL (ref 1.8–7.3)
PARTIAL THROMBOPLASTIN TIME: 28.2 SEC (ref 24.5–35.1)
PHOSPHATE SERPL-MCNC: 3.4 MG/DL (ref 2.5–4.5)
PLATELET # BLD AUTO: 192 K/UL (ref 130–450)
PMV BLD AUTO: 11.2 FL (ref 7–12)
POTASSIUM SERPL-SCNC: 4.7 MMOL/L (ref 3.5–5)
PROT SERPL-MCNC: 5.3 G/DL (ref 6.4–8.3)
PROTHROMBIN TIME: 16.5 SEC (ref 9.3–12.4)
RBC # BLD AUTO: 2.61 M/UL (ref 3.5–5.5)
SODIUM SERPL-SCNC: 137 MMOL/L (ref 132–146)
WBC OTHER # BLD: 5.2 K/UL (ref 4.5–11.5)

## 2023-07-17 PROCEDURE — 6370000000 HC RX 637 (ALT 250 FOR IP): Performed by: NURSE PRACTITIONER

## 2023-07-17 PROCEDURE — 85730 THROMBOPLASTIN TIME PARTIAL: CPT

## 2023-07-17 PROCEDURE — 6370000000 HC RX 637 (ALT 250 FOR IP): Performed by: INTERNAL MEDICINE

## 2023-07-17 PROCEDURE — 2580000003 HC RX 258: Performed by: NURSE PRACTITIONER

## 2023-07-17 PROCEDURE — 80053 COMPREHEN METABOLIC PANEL: CPT

## 2023-07-17 PROCEDURE — 6370000000 HC RX 637 (ALT 250 FOR IP)

## 2023-07-17 PROCEDURE — 99232 SBSQ HOSP IP/OBS MODERATE 35: CPT | Performed by: CLINICAL NURSE SPECIALIST

## 2023-07-17 PROCEDURE — 83735 ASSAY OF MAGNESIUM: CPT

## 2023-07-17 PROCEDURE — 1200000000 HC SEMI PRIVATE

## 2023-07-17 PROCEDURE — 84100 ASSAY OF PHOSPHORUS: CPT

## 2023-07-17 PROCEDURE — 85610 PROTHROMBIN TIME: CPT

## 2023-07-17 PROCEDURE — 2700000000 HC OXYGEN THERAPY PER DAY

## 2023-07-17 PROCEDURE — 99233 SBSQ HOSP IP/OBS HIGH 50: CPT | Performed by: INTERNAL MEDICINE

## 2023-07-17 PROCEDURE — 6370000000 HC RX 637 (ALT 250 FOR IP): Performed by: CLINICAL NURSE SPECIALIST

## 2023-07-17 PROCEDURE — 94660 CPAP INITIATION&MGMT: CPT

## 2023-07-17 PROCEDURE — 85027 COMPLETE CBC AUTOMATED: CPT

## 2023-07-17 RX ORDER — FERROUS SULFATE 325(65) MG
325 TABLET ORAL 2 TIMES DAILY WITH MEALS
Status: DISCONTINUED | OUTPATIENT
Start: 2023-07-17 | End: 2023-07-19 | Stop reason: HOSPADM

## 2023-07-17 RX ORDER — LACTULOSE 10 G/15ML
30 SOLUTION ORAL SEE ADMIN INSTRUCTIONS
Status: DISCONTINUED | OUTPATIENT
Start: 2023-07-18 | End: 2023-07-19 | Stop reason: HOSPADM

## 2023-07-17 RX ORDER — SENNOSIDES A AND B 8.6 MG/1
1 TABLET, FILM COATED ORAL NIGHTLY
Status: DISCONTINUED | OUTPATIENT
Start: 2023-07-17 | End: 2023-07-19 | Stop reason: HOSPADM

## 2023-07-17 RX ORDER — DOCUSATE SODIUM 100 MG/1
200 CAPSULE, LIQUID FILLED ORAL NIGHTLY
Status: DISCONTINUED | OUTPATIENT
Start: 2023-07-17 | End: 2023-07-19 | Stop reason: HOSPADM

## 2023-07-17 RX ADMIN — ACETAMINOPHEN 650 MG: 325 TABLET ORAL at 00:00

## 2023-07-17 RX ADMIN — SODIUM CHLORIDE, PRESERVATIVE FREE 10 ML: 5 INJECTION INTRAVENOUS at 21:32

## 2023-07-17 RX ADMIN — MULTIVITAMIN TABLET 1 TABLET: TABLET at 08:22

## 2023-07-17 RX ADMIN — BENZTROPINE MESYLATE 1 MG: 1 TABLET ORAL at 08:22

## 2023-07-17 RX ADMIN — Medication 100 MG: at 08:22

## 2023-07-17 RX ADMIN — SENNOSIDES 8.6 MG: 8.6 TABLET, COATED ORAL at 21:32

## 2023-07-17 RX ADMIN — DOCUSATE SODIUM 200 MG: 100 CAPSULE, LIQUID FILLED ORAL at 21:30

## 2023-07-17 RX ADMIN — FLUOXETINE 40 MG: 20 CAPSULE ORAL at 08:23

## 2023-07-17 RX ADMIN — SODIUM CHLORIDE, PRESERVATIVE FREE 10 ML: 5 INJECTION INTRAVENOUS at 08:23

## 2023-07-17 RX ADMIN — GABAPENTIN 300 MG: 300 CAPSULE ORAL at 08:22

## 2023-07-17 RX ADMIN — BENZTROPINE MESYLATE 1 MG: 1 TABLET ORAL at 21:29

## 2023-07-17 RX ADMIN — FERROUS SULFATE TAB 325 MG (65 MG ELEMENTAL FE) 325 MG: 325 (65 FE) TAB at 08:26

## 2023-07-17 RX ADMIN — FERROUS SULFATE TAB 325 MG (65 MG ELEMENTAL FE) 325 MG: 325 (65 FE) TAB at 16:51

## 2023-07-17 RX ADMIN — FOLIC ACID 1 MG: 1 TABLET ORAL at 08:22

## 2023-07-17 RX ADMIN — GABAPENTIN 300 MG: 300 CAPSULE ORAL at 21:29

## 2023-07-17 RX ADMIN — LURASIDONE HYDROCHLORIDE 40 MG: 20 TABLET, FILM COATED ORAL at 16:51

## 2023-07-17 ASSESSMENT — PAIN DESCRIPTION - LOCATION: LOCATION: HEAD

## 2023-07-17 ASSESSMENT — PAIN SCALES - GENERAL
PAINLEVEL_OUTOF10: 3
PAINLEVEL_OUTOF10: 0
PAINLEVEL_OUTOF10: 0
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
## 2023-07-17 NOTE — PLAN OF CARE
Problem: Discharge Planning  Goal: Discharge to home or other facility with appropriate resources  Recent Flowsheet Documentation  Taken 7/17/2023 0800 by Maddie Kelly RN  Discharge to home or other facility with appropriate resources: Identify barriers to discharge with patient and caregiver

## 2023-07-17 NOTE — PATIENT CARE CONFERENCE
Intensive Care Daily Quality Rounding Checklist        ICU Team Members: Charge nurse and bedside nurse     ICU Day #: N/A--changed to [facility] on July 14     Intubation Date:  N/A     Ventilator Day #: N/A     Central Line Insertion Date:  N/A                                                    Day #: N/A                                                    Indication: N/A      Arterial Line Insertion Date:  N/A                             Day #: N/A     Temporary Hemodialysis Catheter Insertion Date:  N/A                             Day # N/A     DVT Prophylaxis: Eliquis on hold for liver biopsy    GI Prophylaxis: N/A     Rodriguez Catheter Insertion Date:  N/A                                        Day #: N/A                             Indications: N/A                             Continued need (if yes, reason documented and discussed with physician): N/A     Skin Issues/ Wounds and ordered treatment discussed on rounds: No issues     Goals/ Plans for the Day:  Daily labs, wean oxygen off, advance activity as tolerated, for IR biopsy today

## 2023-07-17 NOTE — PLAN OF CARE
Problem: Risk for Elopement  Goal: Patient will not exit the unit/facility without proper excort  Outcome: Progressing     Problem: Safety - Adult  Goal: Free from fall injury  Outcome: Progressing     Problem: Skin/Tissue Integrity  Goal: Absence of new skin breakdown  Description: 1. Monitor for areas of redness and/or skin breakdown  2. Assess vascular access sites hourly  3. Every 4-6 hours minimum:  Change oxygen saturation probe site  4. Every 4-6 hours:  If on nasal continuous positive airway pressure, respiratory therapy assess nares and determine need for appliance change or resting period.   Outcome: Progressing

## 2023-07-17 NOTE — PROGRESS NOTES
Call made to floor spoke with Vu Erickson RN, patient had eliquis yesterday am will need to wait 48 hours till tomorrow. Keep NPO after midnight tonight and continue to hold eliquis.

## 2023-07-18 ENCOUNTER — APPOINTMENT (OUTPATIENT)
Dept: CT IMAGING | Age: 63
DRG: 817 | End: 2023-07-18
Payer: COMMERCIAL

## 2023-07-18 LAB
ALBUMIN SERPL-MCNC: 3.3 G/DL (ref 3.5–5.2)
ALP SERPL-CCNC: 345 U/L (ref 35–104)
ALT SERPL-CCNC: 27 U/L (ref 0–32)
ANION GAP SERPL CALCULATED.3IONS-SCNC: 10 MMOL/L (ref 7–16)
AST SERPL-CCNC: 44 U/L (ref 0–31)
BASOPHILS # BLD: 0.04 K/UL (ref 0–0.2)
BASOPHILS NFR BLD: 1 % (ref 0–2)
BILIRUB SERPL-MCNC: 1.1 MG/DL (ref 0–1.2)
BUN SERPL-MCNC: 13 MG/DL (ref 6–23)
CALCIUM SERPL-MCNC: 9.1 MG/DL (ref 8.6–10.2)
CHLORIDE SERPL-SCNC: 99 MMOL/L (ref 98–107)
CHROMOGRANIN A: 165 NG/ML (ref 0–103)
CO2 SERPL-SCNC: 29 MMOL/L (ref 22–29)
CREAT SERPL-MCNC: 0.9 MG/DL (ref 0.5–1)
EKG ATRIAL RATE: 111 BPM
EKG ATRIAL RATE: 63 BPM
EKG P AXIS: 65 DEGREES
EKG P AXIS: 71 DEGREES
EKG P-R INTERVAL: 146 MS
EKG P-R INTERVAL: 166 MS
EKG Q-T INTERVAL: 296 MS
EKG Q-T INTERVAL: 398 MS
EKG QRS DURATION: 82 MS
EKG QRS DURATION: 84 MS
EKG QTC CALCULATION (BAZETT): 391 MS
EKG QTC CALCULATION (BAZETT): 407 MS
EKG R AXIS: 58 DEGREES
EKG R AXIS: 75 DEGREES
EKG T AXIS: -156 DEGREES
EKG T AXIS: 68 DEGREES
EKG VENTRICULAR RATE: 105 BPM
EKG VENTRICULAR RATE: 63 BPM
EOSINOPHIL # BLD: 0.16 K/UL (ref 0.05–0.5)
EOSINOPHILS RELATIVE PERCENT: 3 % (ref 0–6)
ERYTHROCYTE [DISTWIDTH] IN BLOOD BY AUTOMATED COUNT: 14.6 % (ref 11.5–15)
GFR SERPL CREATININE-BSD FRML MDRD: >60 ML/MIN/1.73M2
GLUCOSE SERPL-MCNC: 79 MG/DL (ref 74–99)
HCT VFR BLD AUTO: 30 % (ref 34–48)
HGB BLD-MCNC: 9.6 G/DL (ref 11.5–15.5)
IMM GRANULOCYTES # BLD AUTO: <0.03 K/UL (ref 0–0.58)
IMM GRANULOCYTES NFR BLD: 0 % (ref 0–5)
INR PPP: 1.2
LYMPHOCYTES # BLD: 17 % (ref 20–42)
LYMPHOCYTES NFR BLD: 0.98 K/UL (ref 1.5–4)
MAGNESIUM SERPL-MCNC: 1.9 MG/DL (ref 1.6–2.6)
MCH RBC QN AUTO: 31 PG (ref 26–35)
MCHC RBC AUTO-ENTMCNC: 32 G/DL (ref 32–34.5)
MCV RBC AUTO: 96.8 FL (ref 80–99.9)
MONOCYTES NFR BLD: 0.77 K/UL (ref 0.1–0.95)
MONOCYTES NFR BLD: 14 % (ref 2–12)
NEUTROPHILS NFR BLD: 66 % (ref 43–80)
NEUTS SEG NFR BLD: 3.73 K/UL (ref 1.8–7.3)
PHOSPHATE SERPL-MCNC: 4.1 MG/DL (ref 2.5–4.5)
PLATELET # BLD AUTO: 251 K/UL (ref 130–450)
PMV BLD AUTO: 11.6 FL (ref 7–12)
POTASSIUM SERPL-SCNC: 4.6 MMOL/L (ref 3.5–5)
PROT SERPL-MCNC: 6.2 G/DL (ref 6.4–8.3)
PROTHROMBIN TIME: 13.1 SEC (ref 9.3–12.4)
RBC # BLD AUTO: 3.1 M/UL (ref 3.5–5.5)
SODIUM SERPL-SCNC: 138 MMOL/L (ref 132–146)
TROPONIN I SERPL HS-MCNC: 27 NG/L (ref 0–9)
WBC OTHER # BLD: 5.7 K/UL (ref 4.5–11.5)

## 2023-07-18 PROCEDURE — 02HV33Z INSERTION OF INFUSION DEVICE INTO SUPERIOR VENA CAVA, PERCUTANEOUS APPROACH: ICD-10-PCS | Performed by: FAMILY MEDICINE

## 2023-07-18 PROCEDURE — 88307 TISSUE EXAM BY PATHOLOGIST: CPT

## 2023-07-18 PROCEDURE — 77012 CT SCAN FOR NEEDLE BIOPSY: CPT

## 2023-07-18 PROCEDURE — 6360000002 HC RX W HCPCS: Performed by: INTERNAL MEDICINE

## 2023-07-18 PROCEDURE — 97161 PT EVAL LOW COMPLEX 20 MIN: CPT

## 2023-07-18 PROCEDURE — 2500000003 HC RX 250 WO HCPCS: Performed by: RADIOLOGY

## 2023-07-18 PROCEDURE — 80053 COMPREHEN METABOLIC PANEL: CPT

## 2023-07-18 PROCEDURE — 93010 ELECTROCARDIOGRAM REPORT: CPT | Performed by: INTERNAL MEDICINE

## 2023-07-18 PROCEDURE — 2580000003 HC RX 258: Performed by: NURSE PRACTITIONER

## 2023-07-18 PROCEDURE — 6370000000 HC RX 637 (ALT 250 FOR IP): Performed by: CLINICAL NURSE SPECIALIST

## 2023-07-18 PROCEDURE — 83735 ASSAY OF MAGNESIUM: CPT

## 2023-07-18 PROCEDURE — 85610 PROTHROMBIN TIME: CPT

## 2023-07-18 PROCEDURE — 99232 SBSQ HOSP IP/OBS MODERATE 35: CPT | Performed by: TRANSPLANT SURGERY

## 2023-07-18 PROCEDURE — 6370000000 HC RX 637 (ALT 250 FOR IP): Performed by: INTERNAL MEDICINE

## 2023-07-18 PROCEDURE — 94660 CPAP INITIATION&MGMT: CPT

## 2023-07-18 PROCEDURE — 2709999900 CT NEEDLE BIOPSY LIVER PERCUTANEOUS

## 2023-07-18 PROCEDURE — 2700000000 HC OXYGEN THERAPY PER DAY

## 2023-07-18 PROCEDURE — 6360000004 HC RX CONTRAST MEDICATION: Performed by: RADIOLOGY

## 2023-07-18 PROCEDURE — 84484 ASSAY OF TROPONIN QUANT: CPT

## 2023-07-18 PROCEDURE — 84100 ASSAY OF PHOSPHORUS: CPT

## 2023-07-18 PROCEDURE — 88342 IMHCHEM/IMCYTCHM 1ST ANTB: CPT

## 2023-07-18 PROCEDURE — 85027 COMPLETE CBC AUTOMATED: CPT

## 2023-07-18 PROCEDURE — 88341 IMHCHEM/IMCYTCHM EA ADD ANTB: CPT

## 2023-07-18 PROCEDURE — 1200000000 HC SEMI PRIVATE

## 2023-07-18 PROCEDURE — 93005 ELECTROCARDIOGRAM TRACING: CPT | Performed by: STUDENT IN AN ORGANIZED HEALTH CARE EDUCATION/TRAINING PROGRAM

## 2023-07-18 PROCEDURE — 6360000002 HC RX W HCPCS: Performed by: RADIOLOGY

## 2023-07-18 PROCEDURE — 0FB13ZX EXCISION OF RIGHT LOBE LIVER, PERCUTANEOUS APPROACH, DIAGNOSTIC: ICD-10-PCS | Performed by: RADIOLOGY

## 2023-07-18 PROCEDURE — 99233 SBSQ HOSP IP/OBS HIGH 50: CPT | Performed by: INTERNAL MEDICINE

## 2023-07-18 PROCEDURE — 97165 OT EVAL LOW COMPLEX 30 MIN: CPT

## 2023-07-18 RX ORDER — LIDOCAINE HYDROCHLORIDE 20 MG/ML
INJECTION, SOLUTION INFILTRATION; PERINEURAL PRN
Status: COMPLETED | OUTPATIENT
Start: 2023-07-18 | End: 2023-07-18

## 2023-07-18 RX ORDER — FENTANYL CITRATE 50 UG/ML
INJECTION, SOLUTION INTRAMUSCULAR; INTRAVENOUS PRN
Status: COMPLETED | OUTPATIENT
Start: 2023-07-18 | End: 2023-07-18

## 2023-07-18 RX ORDER — MIDAZOLAM HYDROCHLORIDE 2 MG/2ML
INJECTION, SOLUTION INTRAMUSCULAR; INTRAVENOUS PRN
Status: COMPLETED | OUTPATIENT
Start: 2023-07-18 | End: 2023-07-18

## 2023-07-18 RX ADMIN — LURASIDONE HYDROCHLORIDE 40 MG: 20 TABLET, FILM COATED ORAL at 18:10

## 2023-07-18 RX ADMIN — DOCUSATE SODIUM 200 MG: 100 CAPSULE, LIQUID FILLED ORAL at 21:36

## 2023-07-18 RX ADMIN — SENNOSIDES 8.6 MG: 8.6 TABLET, COATED ORAL at 21:36

## 2023-07-18 RX ADMIN — SODIUM CHLORIDE, PRESERVATIVE FREE 10 ML: 5 INJECTION INTRAVENOUS at 21:38

## 2023-07-18 RX ADMIN — ONDANSETRON 4 MG: 2 INJECTION INTRAMUSCULAR; INTRAVENOUS at 18:13

## 2023-07-18 RX ADMIN — IOPAMIDOL 50 ML: 755 INJECTION, SOLUTION INTRAVENOUS at 13:27

## 2023-07-18 RX ADMIN — FENTANYL CITRATE 50 MCG: 50 INJECTION, SOLUTION INTRAMUSCULAR; INTRAVENOUS at 13:00

## 2023-07-18 RX ADMIN — GABAPENTIN 300 MG: 300 CAPSULE ORAL at 21:35

## 2023-07-18 RX ADMIN — BENZTROPINE MESYLATE 1 MG: 1 TABLET ORAL at 21:37

## 2023-07-18 RX ADMIN — MIDAZOLAM HYDROCHLORIDE 1 MG: 1 INJECTION, SOLUTION INTRAMUSCULAR; INTRAVENOUS at 13:00

## 2023-07-18 RX ADMIN — FERROUS SULFATE TAB 325 MG (65 MG ELEMENTAL FE) 325 MG: 325 (65 FE) TAB at 18:10

## 2023-07-18 RX ADMIN — LIDOCAINE HYDROCHLORIDE 15 ML: 20 INJECTION, SOLUTION INFILTRATION; PERINEURAL at 13:13

## 2023-07-18 ASSESSMENT — PAIN SCALES - GENERAL
PAINLEVEL_OUTOF10: 0
PAINLEVEL_OUTOF10: 7
PAINLEVEL_OUTOF10: 0
PAINLEVEL_OUTOF10: 9

## 2023-07-18 ASSESSMENT — PAIN DESCRIPTION - ORIENTATION: ORIENTATION: UPPER

## 2023-07-18 ASSESSMENT — PAIN DESCRIPTION - LOCATION: LOCATION: ABDOMEN

## 2023-07-18 ASSESSMENT — PAIN DESCRIPTION - DESCRIPTORS: DESCRIPTORS: SQUEEZING;SPASM

## 2023-07-18 NOTE — PROGRESS NOTES
Physical Therapy  Facility/Department: [facility]  Physical Therapy Initial Assessment    Name: Annaebl Mendoza  : 1960  MRN: [id number]  Date of Service: 2023        Patient Diagnosis(es): The encounter diagnosis was Suicidal ideation. Past Medical History:  has a past medical history of Alcoholism ([address]), COPD (chronic obstructive pulmonary disease) ([address]), Dental caries, Hypertension, Lung nodules, Schizophrenia ([address]), and Ventricular hypokinesis. Past Surgical History:  has a past surgical history that includes Echo Complete (1/10/2014); Appendectomy;  section; Wrist surgery (Right); shoulder surgery (Right); and pr unlisted procedure dentoalveolar structures (N/A, 2018). Evaluating Therapist: Danitza Cespedes PT      Equipment Recommendation wheeled walker    Room #:  0213/0213-A  Diagnosis:  Acute respiratory failure ([address]) [J96.00]  Suicidal ideation [R45.851]  PMHx/PSHx:  Alcoholism, COPD, Schizophrenia  Precautions:  falls      Social:  Per chart pt admitted from shelter. Pt states she walks without device. Initial Evaluation  Date: 23 Treatment      Short Term/ Long Term   Goals   Was pt agreeable to Eval/treatment? yes     Does pt have pain? R shoulder, R LE, ribs/chest     Bed Mobility  Rolling: SBA  Supine to sit: SBA  Sit to supine: SBA  Scooting: SBA  independent   Transfers Sit to stand: min assist  Stand to sit: min assist  Stand pivot: min assist  independent   Ambulation    20 feet with no device with hand held/min assist  20 feet with ww CGA  150 feet with AAD independent    Stair Negotiation  Ascended and descended  NT   4-12 steps with 1 rail independent   LE strength     3+/5    4/5   balance      fair     AM-PAC Raw score               16/24         Pt is alert and Oriented   LE ROM: WFL  Sensation: intact  Edema: none  Endurance: fair       ASSESSMENT:    Pt displays functional ability as noted in the objective portion of this evaluation.       Patient

## 2023-07-18 NOTE — H&P
Critical Care Admit/Consult Note         Patient Harriet Myers   MRN -  [id number]   Acct # - [de-identified]   - 1960      Date of Admission -  2023  3:32 PM  Date of evaluation -  2023-A   Hospital Day - 0          ADMIT/CONSULT DETAILS     Reason for Admit/Consult   Respiratory failure, hypotension, and lactic acidosis    Consulting Service/Physician   Consulting - Martinez Blevins DO  Primary Care Physician - Cheko Reynolds, CHICHO - CNP         HPI   The patient is a 66 y.o. female with significant past medical history of COPD exacerbation, anxiety, schizophrenia, and drug abuse presented to the ED for suicidal ideation initially. Patient stated she takes cocaine and smoke marijuana daily. Patient was waiting for behavorial health when she became unresponsive in the ED and was given narcan. Patient had an elevated lactate and was retaining CO2. Patient was placed on BIPAP in the unit. Patient did respond to narcan in the ED. Past Medical History         Diagnosis Date    Alcoholism [facility])     H/O    COPD (chronic obstructive pulmonary disease) ([address])     Dental caries     Hypertension     no meds    Lung nodules      [place].     Schizophrenia ([address])     stable    Ventricular hypokinesis     H/O        Past Surgical History           Procedure Laterality Date    APPENDECTOMY       SECTION      2 C SECTION    ECHO COMPLETE  1/10/2014         IN UNLISTED PROCEDURE DENTOALVEOLAR STRUCTURES N/A 2018    MULTIPLE DENTAL EXTRACTIONS AND ALVELOPLASTY performed by Zoie Huber DDS at [facility] Right     WRIST SURGERY Right          Current Medications   Current Medications    folic acid  1 mg Oral Daily    multivitamin  1 tablet Oral Daily    acetaminophen  1,000 mg Oral Once     perflutren lipid microspheres, heparin (porcine), heparin (porcine)  IV Drips/Infusions   norepinephrine Stopped (23 1707)    sodium bicarbonate
Diagnosis: Multiple liver lesions, pancreatic lesion    Procedure: CT guided biopsy of a right hepatic lobe mass    Findings: multiple liver lesions    Specimen:  Three 20 gauge cores    EBL: minimal    Complication: none apparent immediately    Plan: per primary service
has not received Klonipin since January, has not filled that for 5 months. Code Status: TOTAL support  DVT prophylaxis: heparin drip  Disp: discharge in about 4 to 5 days probably to inpatient psychiatry      NOTE: This report was transcribed using voice recognition software. Every effort was made to ensure accuracy; however, inadvertent computerized transcription errors may be present.   Electronically signed by Balbina Gonzalez DO on 7/12/2023 at 5:45 PM

## 2023-07-18 NOTE — OR NURSING
Patient arrived from the floor to cat scan for image guided liver biopsy. Dr. Nat Santana in to speak to the patient prior to the procedure, all questions/concerns addressed. Consent signed per patient. Patient connected to monitoring equipment, vitals checked pre procedure. Patient positioned supine on cat scan table. Patient prepped and draped. Patient medicated with fentanyl and versed per order. IV contrast administered per order to newly placed IV site in the LW. 2% Lidocaine administered to procedural site. Needle inserted to liver, biopsies x 3 cores collected. Images taken and reviewed by Dr. Nat Santana. Needle removed, site cleansed, and dry/sterile dressing applied. Vitals checked post procedure. Patient tolerated procedure well. Procedure completed @ . Patient transported out of dept w/o complication. Nurse to nurse called to ICU. Biopsy specimen taken to lab.

## 2023-07-18 NOTE — CARE COORDINATION
Transition of Care-attempted to meet with patient to discuss SNF placement again, she off off the floor at IR for biopsy. Left [facility] and  [facility] lists to review. Spoke with bedside RN-will discuss with patient . Transfer order off unit to [facility]. Patient was adamant that she wanted to return to [facility], will likely need help with transportation. PT score 16/24.     Madhuri BOWSERN, RN  [facility]

## 2023-07-18 NOTE — PROGRESS NOTES
Occupational Therapy    OCCUPATIONAL THERAPY INITIAL EVALUATION    [facility]   [address]         [id number]                                                  Patient Name: Lucila Covington    MRN: [id number]    : 1960    Room: [address] [place] [address]      Evaluating OT: Jailyn Cali OTR/L   [id number]      Referring [facility]    Specific Provider Orders/Date:OT eval and treat 2023      Diagnosis:  Acute respiratory failure ([address]) [J96.00]  Suicidal ideation [R45.851]     Pertinent Medical History: alcoholism,COPD, schizophrenia,      Precautions:  Fall Risk,      Assessment of current deficits    [x] Functional mobility  [x]ADLs  [x] Strength               []Cognition    [x] Functional transfers   [x] IADLs         [x] Safety Awareness   [x]Endurance    [] Fine Coordination              [x] Balance      [] Vision/perception   []Sensation     []Gross Motor Coordination  [] ROM  [] Delirium                   [] Motor Control     OT PLAN OF CARE   OT POC based on physician orders, patient diagnosis and results of clinical assessment    Frequency/Duration  2-4 days/wk for 2 weeks PRN   Specific OT Treatment Interventions to include:   ADL retraining/adapted techniques and AE recommendations to increase functional independence within precautions                    Energy conservation techniques to improve tolerance for selfcare routine   Functional transfer/mobility training/DME recommendations for increased independence, safety and fall prevention         Patient/family education to increase safety and functional independence             Environmental modifications for safe mobility and completion of ADLs                             Therapeutic activity to improve functional performance during ADLs.                                          Therapeutic exercise to improve tolerance and functional strength for ADLs    Balance

## 2023-07-18 NOTE — PLAN OF CARE
Problem: Discharge Planning  Goal: Discharge to home or other facility with appropriate resources  Outcome: Progressing     Problem: Risk for Elopement  Goal: Patient will not exit the unit/facility without proper excort  Outcome: Progressing     Problem: Safety - Adult  Goal: Free from fall injury  Outcome: Progressing     Problem: Skin/Tissue Integrity  Goal: Absence of new skin breakdown  Description: 1. Monitor for areas of redness and/or skin breakdown  2. Assess vascular access sites hourly  3. Every 4-6 hours minimum:  Change oxygen saturation probe site  4. Every 4-6 hours:  If on nasal continuous positive airway pressure, respiratory therapy assess nares and determine need for appliance change or resting period.   Outcome: Progressing     Problem: Pain  Goal: Verbalizes/displays adequate comfort level or baseline comfort level  Outcome: Progressing  Flowsheets (Taken 7/17/2023 1600 by Tika Toure RN)  Verbalizes/displays adequate comfort level or baseline comfort level: Assess pain using appropriate pain scale     Problem: Nutrition Deficit:  Goal: Optimize nutritional status  Outcome: Progressing     Problem: ABCDS Injury Assessment  Goal: Absence of physical injury  Outcome: Progressing  Flowsheets (Taken 7/18/2023 0014)  Absence of Physical Injury: Implement safety measures based on patient assessment

## 2023-07-18 NOTE — PROGRESS NOTES
Pt have left sided chest, back and arm pain.  Call to Dr. Yeh Nurse was made, order placed for troponin and 12 lead EKG

## 2023-07-18 NOTE — PLAN OF CARE
Problem: Discharge Planning  Goal: Discharge to home or other facility with appropriate resources  7/18/2023 0842 by Kalyan Hinojosa, RN  Outcome: Progressing  Flowsheets (Taken 7/18/2023 0800)  Discharge to home or other facility with appropriate resources:   Arrange for needed discharge resources and transportation as appropriate   Identify barriers to discharge with patient and caregiver

## 2023-07-18 NOTE — PATIENT CARE CONFERENCE
Intensive Care Daily Quality Rounding Checklist        ICU Team Members: General medical      ICU Day #: N/A--changed to [facility] on July 14     Intubation Date:  N/A     Ventilator Day #: N/A     Central Line Insertion Date:  N/A                                                    Day #: N/A                                                    Indication: N/A      Arterial Line Insertion Date:  N/A                             Day #: N/A     Temporary Hemodialysis Catheter Insertion Date:  N/A                             Day # N/A     DVT Prophylaxis: Eliquis on hold for liver biopsy    GI Prophylaxis: N/A     Rodriguez Catheter Insertion Date:  N/A                                        Day #: N/A                             Indications: N/A                             Continued need (if yes, reason documented and discussed with physician): N/A     Skin Issues/ Wounds and ordered treatment discussed on rounds: No issues     Goals/ Plans for the Day:  Monitor labs and vitals, advance activity as tolerated, for IR biopsy today.

## 2023-07-19 VITALS
HEART RATE: 72 BPM | SYSTOLIC BLOOD PRESSURE: 112 MMHG | WEIGHT: 143.52 LBS | BODY MASS INDEX: 20.09 KG/M2 | TEMPERATURE: 98.2 F | RESPIRATION RATE: 18 BRPM | OXYGEN SATURATION: 96 % | HEIGHT: 71 IN | DIASTOLIC BLOOD PRESSURE: 64 MMHG

## 2023-07-19 LAB
ALBUMIN SERPL-MCNC: 3.2 G/DL (ref 3.5–5.2)
ALP SERPL-CCNC: 387 U/L (ref 35–104)
ALT SERPL-CCNC: 27 U/L (ref 0–32)
ANION GAP SERPL CALCULATED.3IONS-SCNC: 12 MMOL/L (ref 7–16)
AST SERPL-CCNC: 55 U/L (ref 0–31)
BASOPHILS # BLD: 0.05 K/UL (ref 0–0.2)
BASOPHILS NFR BLD: 1 % (ref 0–2)
BILIRUB SERPL-MCNC: 1.2 MG/DL (ref 0–1.2)
BUN SERPL-MCNC: 12 MG/DL (ref 6–23)
CALCIUM SERPL-MCNC: 8.8 MG/DL (ref 8.6–10.2)
CHLORIDE SERPL-SCNC: 97 MMOL/L (ref 98–107)
CO2 SERPL-SCNC: 26 MMOL/L (ref 22–29)
CREAT SERPL-MCNC: 0.9 MG/DL (ref 0.5–1)
EOSINOPHIL # BLD: 0.23 K/UL (ref 0.05–0.5)
EOSINOPHILS RELATIVE PERCENT: 4 % (ref 0–6)
ERYTHROCYTE [DISTWIDTH] IN BLOOD BY AUTOMATED COUNT: 14.8 % (ref 11.5–15)
GFR SERPL CREATININE-BSD FRML MDRD: >60 ML/MIN/1.73M2
GLUCOSE SERPL-MCNC: 84 MG/DL (ref 74–99)
HCT VFR BLD AUTO: 27.9 % (ref 34–48)
HGB BLD-MCNC: 9.1 G/DL (ref 11.5–15.5)
IMM GRANULOCYTES # BLD AUTO: <0.03 K/UL (ref 0–0.58)
IMM GRANULOCYTES NFR BLD: 0 % (ref 0–5)
LYMPHOCYTES # BLD: 13 % (ref 20–42)
LYMPHOCYTES NFR BLD: 0.84 K/UL (ref 1.5–4)
MAGNESIUM SERPL-MCNC: 1.9 MG/DL (ref 1.6–2.6)
MCH RBC QN AUTO: 31.4 PG (ref 26–35)
MCHC RBC AUTO-ENTMCNC: 32.6 G/DL (ref 32–34.5)
MCV RBC AUTO: 96.2 FL (ref 80–99.9)
MONOCYTES NFR BLD: 0.95 K/UL (ref 0.1–0.95)
MONOCYTES NFR BLD: 15 % (ref 2–12)
NEUTROPHILS NFR BLD: 68 % (ref 43–80)
NEUTS SEG NFR BLD: 4.36 K/UL (ref 1.8–7.3)
PHOSPHATE SERPL-MCNC: 4.1 MG/DL (ref 2.5–4.5)
PLATELET # BLD AUTO: 230 K/UL (ref 130–450)
PMV BLD AUTO: 11.7 FL (ref 7–12)
POTASSIUM SERPL-SCNC: 4.4 MMOL/L (ref 3.5–5)
PROT SERPL-MCNC: 6.1 G/DL (ref 6.4–8.3)
RBC # BLD AUTO: 2.9 M/UL (ref 3.5–5.5)
SODIUM SERPL-SCNC: 135 MMOL/L (ref 132–146)
WBC OTHER # BLD: 6.5 K/UL (ref 4.5–11.5)

## 2023-07-19 PROCEDURE — 6370000000 HC RX 637 (ALT 250 FOR IP): Performed by: NURSE PRACTITIONER

## 2023-07-19 PROCEDURE — 6370000000 HC RX 637 (ALT 250 FOR IP): Performed by: INTERNAL MEDICINE

## 2023-07-19 PROCEDURE — 80053 COMPREHEN METABOLIC PANEL: CPT

## 2023-07-19 PROCEDURE — 83735 ASSAY OF MAGNESIUM: CPT

## 2023-07-19 PROCEDURE — 6370000000 HC RX 637 (ALT 250 FOR IP)

## 2023-07-19 PROCEDURE — 99232 SBSQ HOSP IP/OBS MODERATE 35: CPT | Performed by: CLINICAL NURSE SPECIALIST

## 2023-07-19 PROCEDURE — 5A09357 ASSISTANCE WITH RESPIRATORY VENTILATION, LESS THAN 24 CONSECUTIVE HOURS, CONTINUOUS POSITIVE AIRWAY PRESSURE: ICD-10-PCS | Performed by: FAMILY MEDICINE

## 2023-07-19 PROCEDURE — 2580000003 HC RX 258: Performed by: NURSE PRACTITIONER

## 2023-07-19 PROCEDURE — 84100 ASSAY OF PHOSPHORUS: CPT

## 2023-07-19 PROCEDURE — 85027 COMPLETE CBC AUTOMATED: CPT

## 2023-07-19 PROCEDURE — 94660 CPAP INITIATION&MGMT: CPT

## 2023-07-19 RX ORDER — FERROUS SULFATE 325(65) MG
325 TABLET ORAL 2 TIMES DAILY WITH MEALS
Qty: 30 TABLET | Refills: 3 | Status: SHIPPED | OUTPATIENT
Start: 2023-07-19

## 2023-07-19 RX ORDER — FOLIC ACID 1 MG/1
1 TABLET ORAL DAILY
Qty: 30 TABLET | Refills: 3 | Status: SHIPPED | OUTPATIENT
Start: 2023-07-20

## 2023-07-19 RX ORDER — MULTIVITAMIN WITH IRON
1 TABLET ORAL DAILY
Qty: 30 TABLET | Refills: 0 | Status: SHIPPED | OUTPATIENT
Start: 2023-07-20

## 2023-07-19 RX ORDER — LANOLIN ALCOHOL/MO/W.PET/CERES
100 CREAM (GRAM) TOPICAL DAILY
Qty: 30 TABLET | Refills: 3 | Status: SHIPPED | OUTPATIENT
Start: 2023-07-20

## 2023-07-19 RX ADMIN — FERROUS SULFATE TAB 325 MG (65 MG ELEMENTAL FE) 325 MG: 325 (65 FE) TAB at 07:56

## 2023-07-19 RX ADMIN — APIXABAN 10 MG: 5 TABLET, FILM COATED ORAL at 10:30

## 2023-07-19 RX ADMIN — BENZTROPINE MESYLATE 1 MG: 1 TABLET ORAL at 07:56

## 2023-07-19 RX ADMIN — FLUOXETINE 40 MG: 20 CAPSULE ORAL at 10:30

## 2023-07-19 RX ADMIN — GABAPENTIN 300 MG: 300 CAPSULE ORAL at 07:57

## 2023-07-19 RX ADMIN — ACETAMINOPHEN 650 MG: 325 TABLET ORAL at 07:56

## 2023-07-19 RX ADMIN — MULTIVITAMIN TABLET 1 TABLET: TABLET at 07:57

## 2023-07-19 RX ADMIN — FOLIC ACID 1 MG: 1 TABLET ORAL at 07:56

## 2023-07-19 RX ADMIN — Medication 100 MG: at 07:56

## 2023-07-19 RX ADMIN — SODIUM CHLORIDE, PRESERVATIVE FREE 10 ML: 5 INJECTION INTRAVENOUS at 07:57

## 2023-07-19 ASSESSMENT — PAIN - FUNCTIONAL ASSESSMENT: PAIN_FUNCTIONAL_ASSESSMENT: PREVENTS OR INTERFERES SOME ACTIVE ACTIVITIES AND ADLS

## 2023-07-19 ASSESSMENT — PAIN SCALES - GENERAL: PAINLEVEL_OUTOF10: 10

## 2023-07-19 ASSESSMENT — PAIN DESCRIPTION - DESCRIPTORS: DESCRIPTORS: SQUEEZING;TENDER

## 2023-07-19 NOTE — CARE COORDINATION
Social Work / Discharge Planning : SW followed up with patient. She wants to return back to [facility]. SW called [facility] and spoke to [phone]. They have a bed for patient and aware she will be returning this afternoon. Patient will need a ww. No DME preference and ordered through [facility] and will be delivered priorto discharge. Transport has been arranged through [employer] and will be here at 3:30. Patient aware and as well as nursing. SW to follow.  Electronically signed by BUCK More on 7/19/23 at 11:49 AM EDT

## 2023-07-19 NOTE — PROGRESS NOTES
Comprehensive Nutrition Assessment    Type and Reason for Visit:  Reassess    Nutrition Recommendations/Plan:   Continue current diet/ONS & monitor     Malnutrition Assessment:  Malnutrition Status:  Severe malnutrition (07/14/23 1326)    Context:  Chronic Illness     Findings of the 6 clinical characteristics of malnutrition:  Energy Intake:  75% or less estimated energy requirements for 1 month or longer  Weight Loss:  Unable to assess     Body Fat Loss:  Severe body fat loss Triceps   Muscle Mass Loss:  Severe muscle mass loss Clavicles (pectoralis & deltoids)  Fluid Accumulation:  No significant fluid accumulation     Strength:  Not Performed    Nutrition Assessment:    Pt w/ pancreatic body mass w/ 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 Note acute respiratory failure resolved. PO meal intake ~50%, continue current diet/ONS & monitor. Nutrition Related Findings:    A&O X4, edentulous, -1.1 L, no edema, abd soft, +BS, elevated LFTs Wound Type: None       Current Nutrition Intake & Therapies:    Average Meal Intake: 26-50%, 51-75%  Average Supplements Intake: Unable to assess  ADULT DIET; Regular  ADULT ORAL NUTRITION SUPPLEMENT; Lunch, Dinner; Frozen Oral Supplement  ADULT ORAL NUTRITION SUPPLEMENT; Breakfast; Clear Liquid Oral Supplement    Anthropometric Measures:  Height: 5' 11\" (180.3 cm)  Ideal Body Weight (IBW): 155 lbs (70 kg)    Admission Body Weight: 143 lb 8.3 oz (65.1 kg) (7/16 bed, first measured)  Current Body Weight: 143 lb 8.3 oz (65.1 kg), 80.6 % IBW. Weight Source: Bed Scale (7/16)  Current BMI (kg/m2): 20  Usual Body Weight: 145 lb (65.8 kg) (Remote EMR review (1-2yr))  % Weight Change (Calculated): -13.8                    BMI Categories: Normal Weight (BMI 18.5-24. 9)    Estimated Daily Nutrient Needs:  Energy Requirements Based On: Formula  Weight Used for Energy Requirements: Current  Energy (kcal/day):   Weight Used for Protein Requirements:

## 2023-07-19 NOTE — PLAN OF CARE
Problem: Discharge Planning  Goal: Discharge to home or other facility with appropriate resources  7/19/2023 1218 by Kenyatta Solis RN  Outcome: Completed  7/19/2023 0315 by Gail Gaona RN  Outcome: Progressing     Problem: Risk for Elopement  Goal: Patient will not exit the unit/facility without proper excort  7/19/2023 1218 by Kenyatta Solis RN  Outcome: Completed  7/19/2023 0315 by Gail Gaona RN  Outcome: Progressing     Problem: Safety - Adult  Goal: Free from fall injury  7/19/2023 1218 by Kenyatta Solis RN  Outcome: Completed  7/19/2023 0315 by Gail Gaona RN  Outcome: Progressing     Problem: Skin/Tissue Integrity  Goal: Absence of new skin breakdown  Description: 1. Monitor for areas of redness and/or skin breakdown  2. Assess vascular access sites hourly  3. Every 4-6 hours minimum:  Change oxygen saturation probe site  4. Every 4-6 hours:  If on nasal continuous positive airway pressure, respiratory therapy assess nares and determine need for appliance change or resting period.   7/19/2023 1218 by Kenyatta Solis RN  Outcome: Completed  7/19/2023 0315 by Gail Gaona RN  Outcome: Progressing     Problem: Pain  Goal: Verbalizes/displays adequate comfort level or baseline comfort level  7/19/2023 1218 by Kenyatta Solis RN  Outcome: Completed  7/19/2023 0315 by Gail Gaona RN  Outcome: Progressing     Problem: Nutrition Deficit:  Goal: Optimize nutritional status  7/19/2023 1218 by Kenyatta Solis RN  Outcome: Completed  Flowsheets (Taken 7/19/2023 0920 by Daryle Lauth, CRUZ, LD)  Nutrient intake appropriate for improving, restoring, or maintaining nutritional needs:   Assess nutritional status and recommend course of action   Monitor oral intake, labs, and treatment plans   Recommend appropriate diets, oral nutritional supplements, and vitamin/mineral supplements  7/19/2023 0315 by Gail Gaona RN  Outcome: Progressing     Problem: ABCDS Injury Assessment  Goal: Absence of

## 2023-07-21 LAB — SURGICAL PATHOLOGY REPORT: NORMAL

## (undated) DEVICE — COVER,LIGHT HANDLE,FLX,2/PK: Brand: MEDLINE INDUSTRIES, INC.

## (undated) DEVICE — TUBING SUCT 12FR MAL ALUM SHFT FN CAP VENT UNIV CONN W/ OBT

## (undated) DEVICE — 1.5L THIN WALL CAN: Brand: CRD

## (undated) DEVICE — TOWEL,OR,DSP,ST,BLUE,STD,6/PK,12PK/CS: Brand: MEDLINE

## (undated) DEVICE — GLOVE ORANGE PI 7 1/2   MSG9075

## (undated) DEVICE — CONTROL SYRINGE LUER-LOCK TIP: Brand: MONOJECT

## (undated) DEVICE — STANDARD HYPODERMIC NEEDLE,POLYPROPYLENE HUB: Brand: MONOJECT

## (undated) DEVICE — PENCIL ES L3M BTTN SWCH HOLSTER W/ BLDE ELECTRD EDGE

## (undated) DEVICE — JELLY PETROLEUM 5GR FOIL PK

## (undated) DEVICE — COUNTER NDL 30 COUNT DBL MAG

## (undated) DEVICE — 4.0MM OVAL SOLID CARBIDE BUR MEDIUM

## (undated) DEVICE — SYRINGE,EAR/ULCER, 3 OZ, STERILE: Brand: MEDLINE

## (undated) DEVICE — MARKER,SKIN,WI/RULER AND LABELS: Brand: MEDLINE

## (undated) DEVICE — INTENDED FOR TISSUE SEPARATION, AND OTHER PROCEDURES THAT REQUIRE A SHARP SURGICAL BLADE TO PUNCTURE OR CUT.: Brand: BARD-PARKER ® STAINLESS STEEL BLADES

## (undated) DEVICE — ELECTRODE PT RET AD L9FT HI MOIST COND ADH HYDRGEL CORDED

## (undated) DEVICE — BANDAGE,GAUZE,4.5"X4.1YD,STERILE,LF: Brand: MEDLINE

## (undated) DEVICE — SURGICAL PROCEDURE PACK EENT CUST

## (undated) DEVICE — YANKAUER,OPEN TIP,W/O VENT,STERILE: Brand: MEDLINE INDUSTRIES, INC.

## (undated) DEVICE — INSTRUMENT SET DENTAL HOUSE

## (undated) DEVICE — BASIC SINGLE BASIN 1-LF: Brand: MEDLINE INDUSTRIES, INC.

## (undated) DEVICE — MICRODISSECTION NEEDLE STRAIGHT SLEEVE: Brand: COLORADO